# Patient Record
Sex: FEMALE | Race: WHITE | NOT HISPANIC OR LATINO | Employment: OTHER | URBAN - METROPOLITAN AREA
[De-identification: names, ages, dates, MRNs, and addresses within clinical notes are randomized per-mention and may not be internally consistent; named-entity substitution may affect disease eponyms.]

---

## 2017-01-01 ENCOUNTER — OFFICE VISIT (OUTPATIENT)
Dept: CARDIOLOGY | Facility: CLINIC | Age: 82
End: 2017-01-01

## 2017-01-01 ENCOUNTER — HOSPITAL ENCOUNTER (INPATIENT)
Facility: HOSPITAL | Age: 82
LOS: 2 days | End: 2017-03-29
Attending: EMERGENCY MEDICINE | Admitting: INTERNAL MEDICINE

## 2017-01-01 ENCOUNTER — APPOINTMENT (OUTPATIENT)
Dept: CT IMAGING | Facility: HOSPITAL | Age: 82
End: 2017-01-01

## 2017-01-01 ENCOUNTER — APPOINTMENT (OUTPATIENT)
Dept: GENERAL RADIOLOGY | Facility: HOSPITAL | Age: 82
End: 2017-01-01

## 2017-01-01 ENCOUNTER — HOSPITAL ENCOUNTER (INPATIENT)
Facility: HOSPITAL | Age: 82
LOS: 10 days | Discharge: NURSING, REHAB OR LONG-TERM CARE | End: 2017-02-15
Attending: EMERGENCY MEDICINE | Admitting: HOSPITALIST

## 2017-01-01 ENCOUNTER — APPOINTMENT (OUTPATIENT)
Dept: CARDIOLOGY | Facility: HOSPITAL | Age: 82
End: 2017-01-01
Attending: HOSPITALIST

## 2017-01-01 ENCOUNTER — APPOINTMENT (OUTPATIENT)
Dept: ULTRASOUND IMAGING | Facility: HOSPITAL | Age: 82
End: 2017-01-01

## 2017-01-01 ENCOUNTER — APPOINTMENT (OUTPATIENT)
Dept: GENERAL RADIOLOGY | Facility: HOSPITAL | Age: 82
End: 2017-01-01
Attending: HOSPITALIST

## 2017-01-01 VITALS
HEIGHT: 62 IN | RESPIRATION RATE: 20 BRPM | HEART RATE: 66 BPM | OXYGEN SATURATION: 93 % | WEIGHT: 214.6 LBS | TEMPERATURE: 97.3 F | SYSTOLIC BLOOD PRESSURE: 119 MMHG | BODY MASS INDEX: 39.49 KG/M2 | DIASTOLIC BLOOD PRESSURE: 48 MMHG

## 2017-01-01 VITALS
HEIGHT: 62 IN | SYSTOLIC BLOOD PRESSURE: 62 MMHG | BODY MASS INDEX: 43.37 KG/M2 | OXYGEN SATURATION: 88 % | TEMPERATURE: 100 F | DIASTOLIC BLOOD PRESSURE: 31 MMHG | WEIGHT: 235.67 LBS

## 2017-01-01 VITALS
HEIGHT: 62 IN | SYSTOLIC BLOOD PRESSURE: 118 MMHG | WEIGHT: 214 LBS | DIASTOLIC BLOOD PRESSURE: 62 MMHG | BODY MASS INDEX: 39.38 KG/M2 | HEART RATE: 63 BPM

## 2017-01-01 DIAGNOSIS — I50.33 ACUTE ON CHRONIC DIASTOLIC (CONGESTIVE) HEART FAILURE (HCC): Primary | ICD-10-CM

## 2017-01-01 DIAGNOSIS — L03.119 CELLULITIS OF LOWER EXTREMITY, UNSPECIFIED LATERALITY: ICD-10-CM

## 2017-01-01 DIAGNOSIS — I73.9 PAD (PERIPHERAL ARTERY DISEASE) (HCC): ICD-10-CM

## 2017-01-01 DIAGNOSIS — J18.9 PNEUMONIA OF BOTH LUNGS DUE TO INFECTIOUS ORGANISM, UNSPECIFIED PART OF LUNG: ICD-10-CM

## 2017-01-01 DIAGNOSIS — E87.0 HYPERNATREMIA: ICD-10-CM

## 2017-01-01 DIAGNOSIS — I50.9 ACUTE CONGESTIVE HEART FAILURE, UNSPECIFIED CONGESTIVE HEART FAILURE TYPE: Primary | ICD-10-CM

## 2017-01-01 DIAGNOSIS — N18.30 CKD (CHRONIC KIDNEY DISEASE) STAGE 3, GFR 30-59 ML/MIN (HCC): ICD-10-CM

## 2017-01-01 DIAGNOSIS — M62.81 MUSCLE WEAKNESS (GENERALIZED): ICD-10-CM

## 2017-01-01 LAB
ALBUMIN SERPL-MCNC: 3.2 G/DL (ref 3.5–5.2)
ALBUMIN SERPL-MCNC: 3.4 G/DL (ref 3.5–5.2)
ALBUMIN SERPL-MCNC: 3.4 G/DL (ref 3.5–5.2)
ALBUMIN SERPL-MCNC: 3.5 G/DL (ref 3.5–5.2)
ALBUMIN/GLOB SERPL: 1.1 G/DL
ALBUMIN/GLOB SERPL: 1.2 G/DL
ALP SERPL-CCNC: 71 U/L (ref 39–117)
ALP SERPL-CCNC: 77 U/L (ref 39–117)
ALP SERPL-CCNC: 86 U/L (ref 39–117)
ALP SERPL-CCNC: 94 U/L (ref 39–117)
ALT SERPL W P-5'-P-CCNC: 10 U/L (ref 1–33)
ALT SERPL W P-5'-P-CCNC: 13 U/L (ref 1–33)
ALT SERPL W P-5'-P-CCNC: 7 U/L (ref 1–33)
ALT SERPL W P-5'-P-CCNC: 8 U/L (ref 1–33)
ANION GAP SERPL CALCULATED.3IONS-SCNC: 10.6 MMOL/L
ANION GAP SERPL CALCULATED.3IONS-SCNC: 11.5 MMOL/L
ANION GAP SERPL CALCULATED.3IONS-SCNC: 12.5 MMOL/L
ANION GAP SERPL CALCULATED.3IONS-SCNC: 12.8 MMOL/L
ANION GAP SERPL CALCULATED.3IONS-SCNC: 13.3 MMOL/L
ANION GAP SERPL CALCULATED.3IONS-SCNC: 13.4 MMOL/L
ANION GAP SERPL CALCULATED.3IONS-SCNC: 13.7 MMOL/L
ANION GAP SERPL CALCULATED.3IONS-SCNC: 14.1 MMOL/L
ANION GAP SERPL CALCULATED.3IONS-SCNC: 14.5 MMOL/L
ANION GAP SERPL CALCULATED.3IONS-SCNC: 14.9 MMOL/L
ANION GAP SERPL CALCULATED.3IONS-SCNC: 15.4 MMOL/L
ANION GAP SERPL CALCULATED.3IONS-SCNC: 15.4 MMOL/L
ANION GAP SERPL CALCULATED.3IONS-SCNC: 15.5 MMOL/L
ANION GAP SERPL CALCULATED.3IONS-SCNC: 9 MMOL/L
ARTERIAL PATENCY WRIST A: ABNORMAL
ASCENDING AORTA: 2.7 CM
AST SERPL-CCNC: 15 U/L (ref 1–32)
AST SERPL-CCNC: 15 U/L (ref 1–32)
AST SERPL-CCNC: 16 U/L (ref 1–32)
AST SERPL-CCNC: 18 U/L (ref 1–32)
ATMOSPHERIC PRESS: 748.2 MMHG
B PERT DNA SPEC QL NAA+PROBE: NOT DETECTED
B PERT DNA SPEC QL NAA+PROBE: NOT DETECTED
BACTERIA SPEC AEROBE CULT: NORMAL
BACTERIA SPEC AEROBE CULT: NORMAL
BACTERIA SPEC RESP CULT: NORMAL
BASE EXCESS BLDA CALC-SCNC: 2.8 MMOL/L (ref 0–2)
BASOPHILS # BLD AUTO: 0.01 10*3/MM3 (ref 0–0.2)
BASOPHILS # BLD AUTO: 0.02 10*3/MM3 (ref 0–0.2)
BASOPHILS # BLD AUTO: 0.02 10*3/MM3 (ref 0–0.2)
BASOPHILS # BLD AUTO: 0.03 10*3/MM3 (ref 0–0.2)
BASOPHILS # BLD AUTO: 0.04 10*3/MM3 (ref 0–0.2)
BASOPHILS # BLD AUTO: 0.04 10*3/MM3 (ref 0–0.2)
BASOPHILS # BLD AUTO: 0.06 10*3/MM3 (ref 0–0.2)
BASOPHILS NFR BLD AUTO: 0.1 % (ref 0–1.5)
BASOPHILS NFR BLD AUTO: 0.2 % (ref 0–1.5)
BASOPHILS NFR BLD AUTO: 0.2 % (ref 0–1.5)
BASOPHILS NFR BLD AUTO: 0.4 % (ref 0–1.5)
BASOPHILS NFR BLD AUTO: 0.5 % (ref 0–1.5)
BASOPHILS NFR BLD AUTO: 0.6 % (ref 0–1.5)
BASOPHILS NFR BLD AUTO: 0.6 % (ref 0–1.5)
BASOPHILS NFR BLD AUTO: 1 % (ref 0–1.5)
BDY SITE: ABNORMAL
BH CV ECHO MEAS - ACS: 1.3 CM
BH CV ECHO MEAS - AO MAX PG (FULL): 5.3 MMHG
BH CV ECHO MEAS - AO MAX PG: 8.1 MMHG
BH CV ECHO MEAS - AO MEAN PG (FULL): 3 MMHG
BH CV ECHO MEAS - AO MEAN PG: 4 MMHG
BH CV ECHO MEAS - AO ROOT AREA (BSA CORRECTED): 1.4
BH CV ECHO MEAS - AO ROOT AREA: 6.2 CM^2
BH CV ECHO MEAS - AO ROOT DIAM: 2.8 CM
BH CV ECHO MEAS - AO V2 MAX: 142 CM/SEC
BH CV ECHO MEAS - AO V2 MEAN: 97.5 CM/SEC
BH CV ECHO MEAS - AO V2 VTI: 34.7 CM
BH CV ECHO MEAS - ASC AORTA: 2.7 CM
BH CV ECHO MEAS - AVA(I,A): 1.8 CM^2
BH CV ECHO MEAS - AVA(I,D): 1.8 CM^2
BH CV ECHO MEAS - AVA(V,A): 1.7 CM^2
BH CV ECHO MEAS - AVA(V,D): 1.7 CM^2
BH CV ECHO MEAS - BSA(HAYCOCK): 2.2 M^2
BH CV ECHO MEAS - BSA: 2 M^2
BH CV ECHO MEAS - BZI_BMI: 43 KILOGRAMS/M^2
BH CV ECHO MEAS - BZI_METRIC_HEIGHT: 157.5 CM
BH CV ECHO MEAS - BZI_METRIC_WEIGHT: 106.6 KG
BH CV ECHO MEAS - CONTRAST EF (2CH): 48.9 ML/M^2
BH CV ECHO MEAS - CONTRAST EF 4CH: 62.7 ML/M^2
BH CV ECHO MEAS - EDV(CUBED): 175.6 ML
BH CV ECHO MEAS - EDV(MOD-SP2): 45 ML
BH CV ECHO MEAS - EDV(MOD-SP4): 59 ML
BH CV ECHO MEAS - EDV(TEICH): 153.7 ML
BH CV ECHO MEAS - EF(CUBED): 71.2 %
BH CV ECHO MEAS - EF(MOD-SP2): 48.9 %
BH CV ECHO MEAS - EF(MOD-SP4): 62.7 %
BH CV ECHO MEAS - EF(TEICH): 62.2 %
BH CV ECHO MEAS - ESV(CUBED): 50.7 ML
BH CV ECHO MEAS - ESV(MOD-SP2): 23 ML
BH CV ECHO MEAS - ESV(MOD-SP4): 22 ML
BH CV ECHO MEAS - ESV(TEICH): 58.1 ML
BH CV ECHO MEAS - FS: 33.9 %
BH CV ECHO MEAS - IVS/LVPW: 1.2
BH CV ECHO MEAS - IVSD: 0.7 CM
BH CV ECHO MEAS - LAT PEAK E' VEL: 8 CM/SEC
BH CV ECHO MEAS - LV DIASTOLIC VOL/BSA (35-75): 28.8 ML/M^2
BH CV ECHO MEAS - LV MASS(C)D: 127.8 GRAMS
BH CV ECHO MEAS - LV MASS(C)DI: 62.4 GRAMS/M^2
BH CV ECHO MEAS - LV MAX PG: 2.8 MMHG
BH CV ECHO MEAS - LV MEAN PG: 1 MMHG
BH CV ECHO MEAS - LV SYSTOLIC VOL/BSA (12-30): 10.7 ML/M^2
BH CV ECHO MEAS - LV V1 MAX: 83.6 CM/SEC
BH CV ECHO MEAS - LV V1 MEAN: 55 CM/SEC
BH CV ECHO MEAS - LV V1 VTI: 22.3 CM
BH CV ECHO MEAS - LVIDD: 5.6 CM
BH CV ECHO MEAS - LVIDS: 3.7 CM
BH CV ECHO MEAS - LVLD AP2: 6 CM
BH CV ECHO MEAS - LVLD AP4: 7.3 CM
BH CV ECHO MEAS - LVLS AP2: 5.7 CM
BH CV ECHO MEAS - LVLS AP4: 5.6 CM
BH CV ECHO MEAS - LVOT AREA (M): 2.8 CM^2
BH CV ECHO MEAS - LVOT AREA: 2.8 CM^2
BH CV ECHO MEAS - LVOT DIAM: 1.9 CM
BH CV ECHO MEAS - LVPWD: 0.6 CM
BH CV ECHO MEAS - MED PEAK E' VEL: 5 CM/SEC
BH CV ECHO MEAS - MV A DUR: 0.14 SEC
BH CV ECHO MEAS - MV A MAX VEL: 37.2 CM/SEC
BH CV ECHO MEAS - MV DEC SLOPE: 489 CM/SEC^2
BH CV ECHO MEAS - MV DEC TIME: 0.17 SEC
BH CV ECHO MEAS - MV E MAX VEL: 118 CM/SEC
BH CV ECHO MEAS - MV E/A: 3.2
BH CV ECHO MEAS - MV MAX PG: 6.3 MMHG
BH CV ECHO MEAS - MV MEAN PG: 1 MMHG
BH CV ECHO MEAS - MV P1/2T MAX VEL: 130 CM/SEC
BH CV ECHO MEAS - MV P1/2T: 77.9 MSEC
BH CV ECHO MEAS - MV V2 MAX: 125 CM/SEC
BH CV ECHO MEAS - MV V2 MEAN: 49.7 CM/SEC
BH CV ECHO MEAS - MV V2 VTI: 31.2 CM
BH CV ECHO MEAS - MVA P1/2T LCG: 1.7 CM^2
BH CV ECHO MEAS - MVA(P1/2T): 2.8 CM^2
BH CV ECHO MEAS - MVA(VTI): 2 CM^2
BH CV ECHO MEAS - PA ACC TIME: 0.18 SEC
BH CV ECHO MEAS - PA MAX PG (FULL): 1.6 MMHG
BH CV ECHO MEAS - PA MAX PG: 2.7 MMHG
BH CV ECHO MEAS - PA PR(ACCEL): -3.4 MMHG
BH CV ECHO MEAS - PA V2 MAX: 81.4 CM/SEC
BH CV ECHO MEAS - PULM DIAS VEL: 70.5 CM/SEC
BH CV ECHO MEAS - PULM S/D: 0.53
BH CV ECHO MEAS - PULM SYS VEL: 37.2 CM/SEC
BH CV ECHO MEAS - PVA(V,A): 4.7 CM^2
BH CV ECHO MEAS - PVA(V,D): 4.7 CM^2
BH CV ECHO MEAS - QP/QS: 1.4
BH CV ECHO MEAS - RAP SYSTOLE: 15 MMHG
BH CV ECHO MEAS - RV MAX PG: 1 MMHG
BH CV ECHO MEAS - RV MEAN PG: 0 MMHG
BH CV ECHO MEAS - RV V1 MAX: 50.2 CM/SEC
BH CV ECHO MEAS - RV V1 MEAN: 31.5 CM/SEC
BH CV ECHO MEAS - RV V1 VTI: 11.7 CM
BH CV ECHO MEAS - RVOT AREA: 7.5 CM^2
BH CV ECHO MEAS - RVOT DIAM: 3.1 CM
BH CV ECHO MEAS - RVSP: 66.3 MMHG
BH CV ECHO MEAS - SI(AO): 104.4 ML/M^2
BH CV ECHO MEAS - SI(CUBED): 61 ML/M^2
BH CV ECHO MEAS - SI(LVOT): 30.9 ML/M^2
BH CV ECHO MEAS - SI(MOD-SP2): 10.7 ML/M^2
BH CV ECHO MEAS - SI(MOD-SP4): 18.1 ML/M^2
BH CV ECHO MEAS - SI(TEICH): 46.7 ML/M^2
BH CV ECHO MEAS - SV(AO): 213.7 ML
BH CV ECHO MEAS - SV(CUBED): 125 ML
BH CV ECHO MEAS - SV(LVOT): 63.2 ML
BH CV ECHO MEAS - SV(MOD-SP2): 22 ML
BH CV ECHO MEAS - SV(MOD-SP4): 37 ML
BH CV ECHO MEAS - SV(RVOT): 88.3 ML
BH CV ECHO MEAS - SV(TEICH): 95.5 ML
BH CV ECHO MEAS - TAPSE (>1.6): 1.3 CM2
BH CV ECHO MEAS - TR MAX VEL: 358 CM/SEC
BH CV LOW VAS LEFT LESSER SAPH VESSEL: 1
BH CV LOWER VASCULAR LEFT COMMON FEMORAL AUGMENT: NORMAL
BH CV LOWER VASCULAR LEFT COMMON FEMORAL COMPETENT: NORMAL
BH CV LOWER VASCULAR LEFT COMMON FEMORAL COMPRESS: NORMAL
BH CV LOWER VASCULAR LEFT COMMON FEMORAL PHASIC: NORMAL
BH CV LOWER VASCULAR LEFT COMMON FEMORAL SPONT: NORMAL
BH CV LOWER VASCULAR LEFT DISTAL FEMORAL COMPRESS: NORMAL
BH CV LOWER VASCULAR LEFT GASTRONEMIUS COMPRESS: NORMAL
BH CV LOWER VASCULAR LEFT GREATER SAPH AK COMPRESS: NORMAL
BH CV LOWER VASCULAR LEFT GREATER SAPH BK COMPRESS: NORMAL
BH CV LOWER VASCULAR LEFT LESSER SAPH COMPRESS: NORMAL
BH CV LOWER VASCULAR LEFT LESSER SAPH THROMBUS: NORMAL
BH CV LOWER VASCULAR LEFT MID FEMORAL AUGMENT: NORMAL
BH CV LOWER VASCULAR LEFT MID FEMORAL COMPETENT: NORMAL
BH CV LOWER VASCULAR LEFT MID FEMORAL COMPRESS: NORMAL
BH CV LOWER VASCULAR LEFT MID FEMORAL PHASIC: NORMAL
BH CV LOWER VASCULAR LEFT MID FEMORAL SPONT: NORMAL
BH CV LOWER VASCULAR LEFT PERONEAL COMPRESS: NORMAL
BH CV LOWER VASCULAR LEFT POPLITEAL AUGMENT: NORMAL
BH CV LOWER VASCULAR LEFT POPLITEAL COMPETENT: NORMAL
BH CV LOWER VASCULAR LEFT POPLITEAL COMPRESS: NORMAL
BH CV LOWER VASCULAR LEFT POPLITEAL PHASIC: NORMAL
BH CV LOWER VASCULAR LEFT POPLITEAL SPONT: NORMAL
BH CV LOWER VASCULAR LEFT POSTERIOR TIBIAL COMPRESS: NORMAL
BH CV LOWER VASCULAR LEFT PROXIMAL FEMORAL COMPRESS: NORMAL
BH CV LOWER VASCULAR LEFT SAPHENOFEMORAL JUNCTION AUGMENT: NORMAL
BH CV LOWER VASCULAR LEFT SAPHENOFEMORAL JUNCTION COMPRESS: NORMAL
BH CV LOWER VASCULAR LEFT SAPHENOFEMORAL JUNCTION PHASIC: NORMAL
BH CV LOWER VASCULAR LEFT SAPHENOFEMORAL JUNCTION SPONT: NORMAL
BH CV LOWER VASCULAR RIGHT COMMON FEMORAL AUGMENT: NORMAL
BH CV LOWER VASCULAR RIGHT COMMON FEMORAL COMPETENT: NORMAL
BH CV LOWER VASCULAR RIGHT COMMON FEMORAL COMPRESS: NORMAL
BH CV LOWER VASCULAR RIGHT COMMON FEMORAL PHASIC: NORMAL
BH CV LOWER VASCULAR RIGHT COMMON FEMORAL SPONT: NORMAL
BH CV LOWER VASCULAR RIGHT DISTAL FEMORAL COMPRESS: NORMAL
BH CV LOWER VASCULAR RIGHT GASTRONEMIUS COMPRESS: NORMAL
BH CV LOWER VASCULAR RIGHT GREATER SAPH AK COMPRESS: NORMAL
BH CV LOWER VASCULAR RIGHT GREATER SAPH BK COMPRESS: NORMAL
BH CV LOWER VASCULAR RIGHT LESSER SAPH COMPRESS: NORMAL
BH CV LOWER VASCULAR RIGHT MID FEMORAL AUGMENT: NORMAL
BH CV LOWER VASCULAR RIGHT MID FEMORAL COMPETENT: NORMAL
BH CV LOWER VASCULAR RIGHT MID FEMORAL COMPRESS: NORMAL
BH CV LOWER VASCULAR RIGHT MID FEMORAL PHASIC: NORMAL
BH CV LOWER VASCULAR RIGHT MID FEMORAL SPONT: NORMAL
BH CV LOWER VASCULAR RIGHT PERONEAL COMPRESS: NORMAL
BH CV LOWER VASCULAR RIGHT POPLITEAL AUGMENT: NORMAL
BH CV LOWER VASCULAR RIGHT POPLITEAL COMPETENT: NORMAL
BH CV LOWER VASCULAR RIGHT POPLITEAL COMPRESS: NORMAL
BH CV LOWER VASCULAR RIGHT POPLITEAL PHASIC: NORMAL
BH CV LOWER VASCULAR RIGHT POPLITEAL SPONT: NORMAL
BH CV LOWER VASCULAR RIGHT POSTERIOR TIBIAL COMPRESS: NORMAL
BH CV LOWER VASCULAR RIGHT PROXIMAL FEMORAL COMPRESS: NORMAL
BH CV LOWER VASCULAR RIGHT SAPHENOFEMORAL JUNCTION AUGMENT: NORMAL
BH CV LOWER VASCULAR RIGHT SAPHENOFEMORAL JUNCTION COMPRESS: NORMAL
BH CV LOWER VASCULAR RIGHT SAPHENOFEMORAL JUNCTION PHASIC: NORMAL
BH CV LOWER VASCULAR RIGHT SAPHENOFEMORAL JUNCTION SPONT: NORMAL
BH CV XLRA - RV BASE: 3.9 CM
BH CV XLRA - TDI S': 9 CM/SEC
BILIRUB SERPL-MCNC: 0.3 MG/DL (ref 0.1–1.2)
BILIRUB SERPL-MCNC: 0.4 MG/DL (ref 0.1–1.2)
BILIRUB SERPL-MCNC: 0.4 MG/DL (ref 0.1–1.2)
BILIRUB SERPL-MCNC: 0.5 MG/DL (ref 0.1–1.2)
BILIRUB UR QL STRIP: NEGATIVE
BUN BLD-MCNC: 44 MG/DL (ref 8–23)
BUN BLD-MCNC: 44 MG/DL (ref 8–23)
BUN BLD-MCNC: 45 MG/DL (ref 8–23)
BUN BLD-MCNC: 45 MG/DL (ref 8–23)
BUN BLD-MCNC: 49 MG/DL (ref 8–23)
BUN BLD-MCNC: 51 MG/DL (ref 8–23)
BUN BLD-MCNC: 52 MG/DL (ref 8–23)
BUN BLD-MCNC: 53 MG/DL (ref 8–23)
BUN BLD-MCNC: 53 MG/DL (ref 8–23)
BUN BLD-MCNC: 54 MG/DL (ref 8–23)
BUN BLD-MCNC: 56 MG/DL (ref 8–23)
BUN BLD-MCNC: 56 MG/DL (ref 8–23)
BUN BLD-MCNC: 57 MG/DL (ref 8–23)
BUN BLD-MCNC: 63 MG/DL (ref 8–23)
BUN/CREAT SERPL: 25 (ref 7–25)
BUN/CREAT SERPL: 25.7 (ref 7–25)
BUN/CREAT SERPL: 26.1 (ref 7–25)
BUN/CREAT SERPL: 26.3 (ref 7–25)
BUN/CREAT SERPL: 26.8 (ref 7–25)
BUN/CREAT SERPL: 28.6 (ref 7–25)
BUN/CREAT SERPL: 29.8 (ref 7–25)
BUN/CREAT SERPL: 29.9 (ref 7–25)
BUN/CREAT SERPL: 30 (ref 7–25)
BUN/CREAT SERPL: 30.2 (ref 7–25)
BUN/CREAT SERPL: 30.6 (ref 7–25)
BUN/CREAT SERPL: 31 (ref 7–25)
BUN/CREAT SERPL: 31.8 (ref 7–25)
BUN/CREAT SERPL: 35.2 (ref 7–25)
C PNEUM DNA NPH QL NAA+NON-PROBE: NOT DETECTED
C PNEUM DNA NPH QL NAA+NON-PROBE: NOT DETECTED
CALCIUM SPEC-SCNC: 10 MG/DL (ref 8.6–10.5)
CALCIUM SPEC-SCNC: 9.1 MG/DL (ref 8.6–10.5)
CALCIUM SPEC-SCNC: 9.2 MG/DL (ref 8.6–10.5)
CALCIUM SPEC-SCNC: 9.3 MG/DL (ref 8.6–10.5)
CALCIUM SPEC-SCNC: 9.4 MG/DL (ref 8.6–10.5)
CALCIUM SPEC-SCNC: 9.6 MG/DL (ref 8.6–10.5)
CALCIUM SPEC-SCNC: 9.6 MG/DL (ref 8.6–10.5)
CALCIUM SPEC-SCNC: 9.7 MG/DL (ref 8.6–10.5)
CALCIUM SPEC-SCNC: 9.8 MG/DL (ref 8.6–10.5)
CALCIUM SPEC-SCNC: 9.8 MG/DL (ref 8.6–10.5)
CALCIUM SPEC-SCNC: 9.9 MG/DL (ref 8.6–10.5)
CALCIUM SPEC-SCNC: 9.9 MG/DL (ref 8.6–10.5)
CHLORIDE SERPL-SCNC: 101 MMOL/L (ref 98–107)
CHLORIDE SERPL-SCNC: 102 MMOL/L (ref 98–107)
CHLORIDE SERPL-SCNC: 102 MMOL/L (ref 98–107)
CHLORIDE SERPL-SCNC: 103 MMOL/L (ref 98–107)
CHLORIDE SERPL-SCNC: 105 MMOL/L (ref 98–107)
CHLORIDE SERPL-SCNC: 105 MMOL/L (ref 98–107)
CK SERPL-CCNC: 32 U/L (ref 20–180)
CLARITY UR: ABNORMAL
CO2 SERPL-SCNC: 20.6 MMOL/L (ref 22–29)
CO2 SERPL-SCNC: 21.5 MMOL/L (ref 22–29)
CO2 SERPL-SCNC: 22.1 MMOL/L (ref 22–29)
CO2 SERPL-SCNC: 22.6 MMOL/L (ref 22–29)
CO2 SERPL-SCNC: 25.7 MMOL/L (ref 22–29)
CO2 SERPL-SCNC: 26.5 MMOL/L (ref 22–29)
CO2 SERPL-SCNC: 26.6 MMOL/L (ref 22–29)
CO2 SERPL-SCNC: 27.5 MMOL/L (ref 22–29)
CO2 SERPL-SCNC: 30.2 MMOL/L (ref 22–29)
CO2 SERPL-SCNC: 30.5 MMOL/L (ref 22–29)
CO2 SERPL-SCNC: 30.9 MMOL/L (ref 22–29)
CO2 SERPL-SCNC: 31.3 MMOL/L (ref 22–29)
CO2 SERPL-SCNC: 34 MMOL/L (ref 22–29)
CO2 SERPL-SCNC: 34.4 MMOL/L (ref 22–29)
COLOR UR: YELLOW
CREAT BLD-MCNC: 1.5 MG/DL (ref 0.57–1)
CREAT BLD-MCNC: 1.51 MG/DL (ref 0.57–1)
CREAT BLD-MCNC: 1.62 MG/DL (ref 0.57–1)
CREAT BLD-MCNC: 1.62 MG/DL (ref 0.57–1)
CREAT BLD-MCNC: 1.64 MG/DL (ref 0.57–1)
CREAT BLD-MCNC: 1.74 MG/DL (ref 0.57–1)
CREAT BLD-MCNC: 1.76 MG/DL (ref 0.57–1)
CREAT BLD-MCNC: 1.76 MG/DL (ref 0.57–1)
CREAT BLD-MCNC: 1.77 MG/DL (ref 0.57–1)
CREAT BLD-MCNC: 1.83 MG/DL (ref 0.57–1)
CREAT BLD-MCNC: 1.85 MG/DL (ref 0.57–1)
CREAT BLD-MCNC: 1.94 MG/DL (ref 0.57–1)
CREAT BLD-MCNC: 1.99 MG/DL (ref 0.57–1)
CREAT BLD-MCNC: 2.45 MG/DL (ref 0.57–1)
CRP SERPL-MCNC: 1.91 MG/DL (ref 0–0.5)
DEPRECATED RDW RBC AUTO: 51.8 FL (ref 37–54)
DEPRECATED RDW RBC AUTO: 52.3 FL (ref 37–54)
DEPRECATED RDW RBC AUTO: 52.8 FL (ref 37–54)
DEPRECATED RDW RBC AUTO: 52.8 FL (ref 37–54)
DEPRECATED RDW RBC AUTO: 53.1 FL (ref 37–54)
DEPRECATED RDW RBC AUTO: 53.4 FL (ref 37–54)
DEPRECATED RDW RBC AUTO: 53.7 FL (ref 37–54)
DEPRECATED RDW RBC AUTO: 54 FL (ref 37–54)
DEPRECATED RDW RBC AUTO: 55.3 FL (ref 37–54)
DEPRECATED RDW RBC AUTO: 56.1 FL (ref 37–54)
DEPRECATED RDW RBC AUTO: 56.2 FL (ref 37–54)
DEPRECATED RDW RBC AUTO: 56.2 FL (ref 37–54)
DEPRECATED RDW RBC AUTO: 57.4 FL (ref 37–54)
E/E' RATIO: 20
EOSINOPHIL # BLD AUTO: 0 10*3/MM3 (ref 0–0.7)
EOSINOPHIL # BLD AUTO: 0 10*3/MM3 (ref 0–0.7)
EOSINOPHIL # BLD AUTO: 0.07 10*3/MM3 (ref 0–0.7)
EOSINOPHIL # BLD AUTO: 0.21 10*3/MM3 (ref 0–0.7)
EOSINOPHIL # BLD AUTO: 0.24 10*3/MM3 (ref 0–0.7)
EOSINOPHIL # BLD AUTO: 0.24 10*3/MM3 (ref 0–0.7)
EOSINOPHIL # BLD AUTO: 0.25 10*3/MM3 (ref 0–0.7)
EOSINOPHIL # BLD AUTO: 0.3 10*3/MM3 (ref 0–0.7)
EOSINOPHIL # BLD AUTO: 0.34 10*3/MM3 (ref 0–0.7)
EOSINOPHIL # BLD AUTO: 0.38 10*3/MM3 (ref 0–0.7)
EOSINOPHIL NFR BLD AUTO: 0 % (ref 0.3–6.2)
EOSINOPHIL NFR BLD AUTO: 0 % (ref 0.3–6.2)
EOSINOPHIL NFR BLD AUTO: 0.8 % (ref 0.3–6.2)
EOSINOPHIL NFR BLD AUTO: 2.9 % (ref 0.3–6.2)
EOSINOPHIL NFR BLD AUTO: 3.7 % (ref 0.3–6.2)
EOSINOPHIL NFR BLD AUTO: 3.8 % (ref 0.3–6.2)
EOSINOPHIL NFR BLD AUTO: 4 % (ref 0.3–6.2)
EOSINOPHIL NFR BLD AUTO: 4.5 % (ref 0.3–6.2)
EOSINOPHIL NFR BLD AUTO: 4.9 % (ref 0.3–6.2)
EOSINOPHIL NFR BLD AUTO: 6.8 % (ref 0.3–6.2)
ERYTHROCYTE [DISTWIDTH] IN BLOOD BY AUTOMATED COUNT: 15.4 % (ref 11.7–13)
ERYTHROCYTE [DISTWIDTH] IN BLOOD BY AUTOMATED COUNT: 15.5 % (ref 11.7–13)
ERYTHROCYTE [DISTWIDTH] IN BLOOD BY AUTOMATED COUNT: 15.6 % (ref 11.7–13)
ERYTHROCYTE [DISTWIDTH] IN BLOOD BY AUTOMATED COUNT: 15.7 % (ref 11.7–13)
ERYTHROCYTE [DISTWIDTH] IN BLOOD BY AUTOMATED COUNT: 15.7 % (ref 11.7–13)
ERYTHROCYTE [DISTWIDTH] IN BLOOD BY AUTOMATED COUNT: 16 % (ref 11.7–13)
ERYTHROCYTE [DISTWIDTH] IN BLOOD BY AUTOMATED COUNT: 16.2 % (ref 11.7–13)
ERYTHROCYTE [DISTWIDTH] IN BLOOD BY AUTOMATED COUNT: 16.2 % (ref 11.7–13)
ERYTHROCYTE [DISTWIDTH] IN BLOOD BY AUTOMATED COUNT: 16.3 % (ref 11.7–13)
ERYTHROCYTE [DISTWIDTH] IN BLOOD BY AUTOMATED COUNT: 16.3 % (ref 11.7–13)
FLUAV H1 2009 PAND RNA NPH QL NAA+PROBE: NOT DETECTED
FLUAV H1 2009 PAND RNA NPH QL NAA+PROBE: NOT DETECTED
FLUAV H1 HA GENE NPH QL NAA+PROBE: NOT DETECTED
FLUAV H1 HA GENE NPH QL NAA+PROBE: NOT DETECTED
FLUAV H3 RNA NPH QL NAA+PROBE: NOT DETECTED
FLUAV H3 RNA NPH QL NAA+PROBE: NOT DETECTED
FLUAV SUBTYP SPEC NAA+PROBE: NOT DETECTED
FLUAV SUBTYP SPEC NAA+PROBE: NOT DETECTED
FLUBV RNA ISLT QL NAA+PROBE: NOT DETECTED
FLUBV RNA ISLT QL NAA+PROBE: NOT DETECTED
FOLATE SERPL-MCNC: >20 NG/ML (ref 4.78–24.2)
GAS FLOW AIRWAY: 6 LPM
GFR SERPL CREATININE-BSD FRML MDRD: 19 ML/MIN/1.73
GFR SERPL CREATININE-BSD FRML MDRD: 24 ML/MIN/1.73
GFR SERPL CREATININE-BSD FRML MDRD: 25 ML/MIN/1.73
GFR SERPL CREATININE-BSD FRML MDRD: 26 ML/MIN/1.73
GFR SERPL CREATININE-BSD FRML MDRD: 26 ML/MIN/1.73
GFR SERPL CREATININE-BSD FRML MDRD: 27 ML/MIN/1.73
GFR SERPL CREATININE-BSD FRML MDRD: 28 ML/MIN/1.73
GFR SERPL CREATININE-BSD FRML MDRD: 30 ML/MIN/1.73
GFR SERPL CREATININE-BSD FRML MDRD: 33 ML/MIN/1.73
GFR SERPL CREATININE-BSD FRML MDRD: 33 ML/MIN/1.73
GLOBULIN UR ELPH-MCNC: 2.8 GM/DL
GLOBULIN UR ELPH-MCNC: 2.9 GM/DL
GLOBULIN UR ELPH-MCNC: 3 GM/DL
GLOBULIN UR ELPH-MCNC: 3.1 GM/DL
GLUCOSE BLD-MCNC: 107 MG/DL (ref 65–99)
GLUCOSE BLD-MCNC: 115 MG/DL (ref 65–99)
GLUCOSE BLD-MCNC: 121 MG/DL (ref 65–99)
GLUCOSE BLD-MCNC: 125 MG/DL (ref 65–99)
GLUCOSE BLD-MCNC: 132 MG/DL (ref 65–99)
GLUCOSE BLD-MCNC: 144 MG/DL (ref 65–99)
GLUCOSE BLD-MCNC: 165 MG/DL (ref 65–99)
GLUCOSE BLD-MCNC: 40 MG/DL (ref 65–99)
GLUCOSE BLD-MCNC: 69 MG/DL (ref 65–99)
GLUCOSE BLD-MCNC: 86 MG/DL (ref 65–99)
GLUCOSE BLD-MCNC: 89 MG/DL (ref 65–99)
GLUCOSE BLD-MCNC: 96 MG/DL (ref 65–99)
GLUCOSE BLD-MCNC: 97 MG/DL (ref 65–99)
GLUCOSE BLD-MCNC: 99 MG/DL (ref 65–99)
GLUCOSE BLDC GLUCOMTR-MCNC: 102 MG/DL (ref 70–130)
GLUCOSE BLDC GLUCOMTR-MCNC: 109 MG/DL (ref 70–130)
GLUCOSE BLDC GLUCOMTR-MCNC: 110 MG/DL (ref 70–130)
GLUCOSE BLDC GLUCOMTR-MCNC: 111 MG/DL (ref 70–130)
GLUCOSE BLDC GLUCOMTR-MCNC: 111 MG/DL (ref 70–130)
GLUCOSE BLDC GLUCOMTR-MCNC: 113 MG/DL (ref 70–130)
GLUCOSE BLDC GLUCOMTR-MCNC: 115 MG/DL (ref 70–130)
GLUCOSE BLDC GLUCOMTR-MCNC: 115 MG/DL (ref 70–130)
GLUCOSE BLDC GLUCOMTR-MCNC: 116 MG/DL (ref 70–130)
GLUCOSE BLDC GLUCOMTR-MCNC: 118 MG/DL (ref 70–130)
GLUCOSE BLDC GLUCOMTR-MCNC: 125 MG/DL (ref 70–130)
GLUCOSE BLDC GLUCOMTR-MCNC: 127 MG/DL (ref 70–130)
GLUCOSE BLDC GLUCOMTR-MCNC: 128 MG/DL (ref 70–130)
GLUCOSE BLDC GLUCOMTR-MCNC: 131 MG/DL (ref 70–130)
GLUCOSE BLDC GLUCOMTR-MCNC: 139 MG/DL (ref 70–130)
GLUCOSE BLDC GLUCOMTR-MCNC: 140 MG/DL (ref 70–130)
GLUCOSE BLDC GLUCOMTR-MCNC: 142 MG/DL (ref 70–130)
GLUCOSE BLDC GLUCOMTR-MCNC: 144 MG/DL (ref 70–130)
GLUCOSE BLDC GLUCOMTR-MCNC: 150 MG/DL (ref 70–130)
GLUCOSE BLDC GLUCOMTR-MCNC: 161 MG/DL (ref 70–130)
GLUCOSE BLDC GLUCOMTR-MCNC: 164 MG/DL (ref 70–130)
GLUCOSE BLDC GLUCOMTR-MCNC: 173 MG/DL (ref 70–130)
GLUCOSE BLDC GLUCOMTR-MCNC: 179 MG/DL (ref 70–130)
GLUCOSE BLDC GLUCOMTR-MCNC: 182 MG/DL (ref 70–130)
GLUCOSE BLDC GLUCOMTR-MCNC: 186 MG/DL (ref 70–130)
GLUCOSE BLDC GLUCOMTR-MCNC: 190 MG/DL (ref 70–130)
GLUCOSE BLDC GLUCOMTR-MCNC: 195 MG/DL (ref 70–130)
GLUCOSE BLDC GLUCOMTR-MCNC: 198 MG/DL (ref 70–130)
GLUCOSE BLDC GLUCOMTR-MCNC: 206 MG/DL (ref 70–130)
GLUCOSE BLDC GLUCOMTR-MCNC: 206 MG/DL (ref 70–130)
GLUCOSE BLDC GLUCOMTR-MCNC: 208 MG/DL (ref 70–130)
GLUCOSE BLDC GLUCOMTR-MCNC: 212 MG/DL (ref 70–130)
GLUCOSE BLDC GLUCOMTR-MCNC: 224 MG/DL (ref 70–130)
GLUCOSE BLDC GLUCOMTR-MCNC: 239 MG/DL (ref 70–130)
GLUCOSE BLDC GLUCOMTR-MCNC: 254 MG/DL (ref 70–130)
GLUCOSE BLDC GLUCOMTR-MCNC: 51 MG/DL (ref 70–130)
GLUCOSE BLDC GLUCOMTR-MCNC: 70 MG/DL (ref 70–130)
GLUCOSE BLDC GLUCOMTR-MCNC: 74 MG/DL (ref 70–130)
GLUCOSE BLDC GLUCOMTR-MCNC: 77 MG/DL (ref 70–130)
GLUCOSE BLDC GLUCOMTR-MCNC: 79 MG/DL (ref 70–130)
GLUCOSE BLDC GLUCOMTR-MCNC: 79 MG/DL (ref 70–130)
GLUCOSE BLDC GLUCOMTR-MCNC: 84 MG/DL (ref 70–130)
GLUCOSE BLDC GLUCOMTR-MCNC: 87 MG/DL (ref 70–130)
GLUCOSE BLDC GLUCOMTR-MCNC: 88 MG/DL (ref 70–130)
GLUCOSE BLDC GLUCOMTR-MCNC: 95 MG/DL (ref 70–130)
GLUCOSE BLDC GLUCOMTR-MCNC: 97 MG/DL (ref 70–130)
GLUCOSE BLDC GLUCOMTR-MCNC: 98 MG/DL (ref 70–130)
GLUCOSE UR STRIP-MCNC: NEGATIVE MG/DL
GRAM STN SPEC: NORMAL
GRAM STN SPEC: NORMAL
HADV DNA SPEC NAA+PROBE: NOT DETECTED
HADV DNA SPEC NAA+PROBE: NOT DETECTED
HBA1C MFR BLD: 6.04 % (ref 4.8–5.6)
HCO3 BLDA-SCNC: 28.4 MMOL/L (ref 22–28)
HCOV 229E RNA SPEC QL NAA+PROBE: NOT DETECTED
HCOV 229E RNA SPEC QL NAA+PROBE: NOT DETECTED
HCOV HKU1 RNA SPEC QL NAA+PROBE: NOT DETECTED
HCOV HKU1 RNA SPEC QL NAA+PROBE: NOT DETECTED
HCOV NL63 RNA SPEC QL NAA+PROBE: NOT DETECTED
HCOV NL63 RNA SPEC QL NAA+PROBE: NOT DETECTED
HCOV OC43 RNA SPEC QL NAA+PROBE: NOT DETECTED
HCOV OC43 RNA SPEC QL NAA+PROBE: NOT DETECTED
HCT VFR BLD AUTO: 30.4 % (ref 35.6–45.5)
HCT VFR BLD AUTO: 31.4 % (ref 35.6–45.5)
HCT VFR BLD AUTO: 31.5 % (ref 35.6–45.5)
HCT VFR BLD AUTO: 31.8 % (ref 35.6–45.5)
HCT VFR BLD AUTO: 32.3 % (ref 35.6–45.5)
HCT VFR BLD AUTO: 32.4 % (ref 35.6–45.5)
HCT VFR BLD AUTO: 32.7 % (ref 35.6–45.5)
HCT VFR BLD AUTO: 32.7 % (ref 35.6–45.5)
HCT VFR BLD AUTO: 33 % (ref 35.6–45.5)
HCT VFR BLD AUTO: 33.6 % (ref 35.6–45.5)
HCT VFR BLD AUTO: 33.7 % (ref 35.6–45.5)
HCT VFR BLD AUTO: 34.2 % (ref 35.6–45.5)
HCT VFR BLD AUTO: 34.5 % (ref 35.6–45.5)
HGB BLD-MCNC: 10 G/DL (ref 11.9–15.5)
HGB BLD-MCNC: 10.2 G/DL (ref 11.9–15.5)
HGB BLD-MCNC: 10.3 G/DL (ref 11.9–15.5)
HGB BLD-MCNC: 10.6 G/DL (ref 11.9–15.5)
HGB BLD-MCNC: 9.5 G/DL (ref 11.9–15.5)
HGB BLD-MCNC: 9.5 G/DL (ref 11.9–15.5)
HGB BLD-MCNC: 9.6 G/DL (ref 11.9–15.5)
HGB BLD-MCNC: 9.8 G/DL (ref 11.9–15.5)
HGB BLD-MCNC: 9.9 G/DL (ref 11.9–15.5)
HGB UR QL STRIP.AUTO: NEGATIVE
HMPV RNA NPH QL NAA+NON-PROBE: DETECTED
HMPV RNA NPH QL NAA+NON-PROBE: NOT DETECTED
HOLD SPECIMEN: NORMAL
HOLD SPECIMEN: NORMAL
HPIV1 RNA SPEC QL NAA+PROBE: NOT DETECTED
HPIV1 RNA SPEC QL NAA+PROBE: NOT DETECTED
HPIV2 RNA SPEC QL NAA+PROBE: NOT DETECTED
HPIV2 RNA SPEC QL NAA+PROBE: NOT DETECTED
HPIV3 RNA NPH QL NAA+PROBE: NOT DETECTED
HPIV3 RNA NPH QL NAA+PROBE: NOT DETECTED
HPIV4 P GENE NPH QL NAA+PROBE: NOT DETECTED
HPIV4 P GENE NPH QL NAA+PROBE: NOT DETECTED
IMM GRANULOCYTES # BLD: 0 10*3/MM3 (ref 0–0.03)
IMM GRANULOCYTES # BLD: 0.02 10*3/MM3 (ref 0–0.03)
IMM GRANULOCYTES NFR BLD: 0 % (ref 0–0.5)
IMM GRANULOCYTES NFR BLD: 0.2 % (ref 0–0.5)
IMM GRANULOCYTES NFR BLD: 0.2 % (ref 0–0.5)
IMM GRANULOCYTES NFR BLD: 0.3 % (ref 0–0.5)
IRON 24H UR-MRATE: 43 MCG/DL (ref 37–145)
IRON SATN MFR SERPL: 13 % (ref 20–50)
KETONES UR QL STRIP: NEGATIVE
LEFT ATRIUM VOLUME INDEX: 29 ML/M2
LEUKOCYTE ESTERASE UR QL STRIP.AUTO: NEGATIVE
LYMPHOCYTES # BLD AUTO: 0.61 10*3/MM3 (ref 0.9–4.8)
LYMPHOCYTES # BLD AUTO: 1.02 10*3/MM3 (ref 0.9–4.8)
LYMPHOCYTES # BLD AUTO: 1.22 10*3/MM3 (ref 0.9–4.8)
LYMPHOCYTES # BLD AUTO: 1.28 10*3/MM3 (ref 0.9–4.8)
LYMPHOCYTES # BLD AUTO: 1.33 10*3/MM3 (ref 0.9–4.8)
LYMPHOCYTES # BLD AUTO: 1.37 10*3/MM3 (ref 0.9–4.8)
LYMPHOCYTES # BLD AUTO: 1.43 10*3/MM3 (ref 0.9–4.8)
LYMPHOCYTES # BLD AUTO: 1.58 10*3/MM3 (ref 0.9–4.8)
LYMPHOCYTES # BLD AUTO: 1.6 10*3/MM3 (ref 0.9–4.8)
LYMPHOCYTES # BLD AUTO: 1.71 10*3/MM3 (ref 0.9–4.8)
LYMPHOCYTES NFR BLD AUTO: 11.2 % (ref 19.6–45.3)
LYMPHOCYTES NFR BLD AUTO: 13.8 % (ref 19.6–45.3)
LYMPHOCYTES NFR BLD AUTO: 20.4 % (ref 19.6–45.3)
LYMPHOCYTES NFR BLD AUTO: 20.7 % (ref 19.6–45.3)
LYMPHOCYTES NFR BLD AUTO: 22.9 % (ref 19.6–45.3)
LYMPHOCYTES NFR BLD AUTO: 23 % (ref 19.6–45.3)
LYMPHOCYTES NFR BLD AUTO: 23.9 % (ref 19.6–45.3)
LYMPHOCYTES NFR BLD AUTO: 24 % (ref 19.6–45.3)
LYMPHOCYTES NFR BLD AUTO: 24.6 % (ref 19.6–45.3)
LYMPHOCYTES NFR BLD AUTO: 9.1 % (ref 19.6–45.3)
M PNEUMO IGG SER IA-ACNC: NOT DETECTED
M PNEUMO IGG SER IA-ACNC: NOT DETECTED
MAGNESIUM SERPL-MCNC: 2.1 MG/DL (ref 1.6–2.4)
MCH RBC QN AUTO: 27.9 PG (ref 26.9–32)
MCH RBC QN AUTO: 28 PG (ref 26.9–32)
MCH RBC QN AUTO: 28.3 PG (ref 26.9–32)
MCH RBC QN AUTO: 28.5 PG (ref 26.9–32)
MCH RBC QN AUTO: 28.6 PG (ref 26.9–32)
MCH RBC QN AUTO: 28.6 PG (ref 26.9–32)
MCH RBC QN AUTO: 28.9 PG (ref 26.9–32)
MCH RBC QN AUTO: 29 PG (ref 26.9–32)
MCH RBC QN AUTO: 29.1 PG (ref 26.9–32)
MCH RBC QN AUTO: 29.3 PG (ref 26.9–32)
MCHC RBC AUTO-ENTMCNC: 29.4 G/DL (ref 32.4–36.3)
MCHC RBC AUTO-ENTMCNC: 29.6 G/DL (ref 32.4–36.3)
MCHC RBC AUTO-ENTMCNC: 29.9 G/DL (ref 32.4–36.3)
MCHC RBC AUTO-ENTMCNC: 30.3 G/DL (ref 32.4–36.3)
MCHC RBC AUTO-ENTMCNC: 30.4 G/DL (ref 32.4–36.3)
MCHC RBC AUTO-ENTMCNC: 30.8 G/DL (ref 32.4–36.3)
MCHC RBC AUTO-ENTMCNC: 31 G/DL (ref 32.4–36.3)
MCHC RBC AUTO-ENTMCNC: 31 G/DL (ref 32.4–36.3)
MCHC RBC AUTO-ENTMCNC: 31.3 G/DL (ref 32.4–36.3)
MCHC RBC AUTO-ENTMCNC: 31.7 G/DL (ref 32.4–36.3)
MCV RBC AUTO: 92.4 FL (ref 80.5–98.2)
MCV RBC AUTO: 92.4 FL (ref 80.5–98.2)
MCV RBC AUTO: 93.3 FL (ref 80.5–98.2)
MCV RBC AUTO: 93.4 FL (ref 80.5–98.2)
MCV RBC AUTO: 93.5 FL (ref 80.5–98.2)
MCV RBC AUTO: 94.1 FL (ref 80.5–98.2)
MCV RBC AUTO: 94.2 FL (ref 80.5–98.2)
MCV RBC AUTO: 94.6 FL (ref 80.5–98.2)
MCV RBC AUTO: 95.2 FL (ref 80.5–98.2)
MCV RBC AUTO: 95.2 FL (ref 80.5–98.2)
MCV RBC AUTO: 95.3 FL (ref 80.5–98.2)
MODALITY: ABNORMAL
MONOCYTES # BLD AUTO: 0.48 10*3/MM3 (ref 0.2–1.2)
MONOCYTES # BLD AUTO: 0.51 10*3/MM3 (ref 0.2–1.2)
MONOCYTES # BLD AUTO: 0.55 10*3/MM3 (ref 0.2–1.2)
MONOCYTES # BLD AUTO: 0.65 10*3/MM3 (ref 0.2–1.2)
MONOCYTES # BLD AUTO: 0.65 10*3/MM3 (ref 0.2–1.2)
MONOCYTES # BLD AUTO: 0.75 10*3/MM3 (ref 0.2–1.2)
MONOCYTES # BLD AUTO: 0.81 10*3/MM3 (ref 0.2–1.2)
MONOCYTES # BLD AUTO: 0.89 10*3/MM3 (ref 0.2–1.2)
MONOCYTES # BLD AUTO: 0.91 10*3/MM3 (ref 0.2–1.2)
MONOCYTES # BLD AUTO: 1.4 10*3/MM3 (ref 0.2–1.2)
MONOCYTES NFR BLD AUTO: 10.3 % (ref 5–12)
MONOCYTES NFR BLD AUTO: 10.4 % (ref 5–12)
MONOCYTES NFR BLD AUTO: 10.9 % (ref 5–12)
MONOCYTES NFR BLD AUTO: 11.3 % (ref 5–12)
MONOCYTES NFR BLD AUTO: 14.2 % (ref 5–12)
MONOCYTES NFR BLD AUTO: 15.4 % (ref 5–12)
MONOCYTES NFR BLD AUTO: 7.5 % (ref 5–12)
MONOCYTES NFR BLD AUTO: 8.2 % (ref 5–12)
MONOCYTES NFR BLD AUTO: 9.1 % (ref 5–12)
MONOCYTES NFR BLD AUTO: 9.7 % (ref 5–12)
MRSA SPEC QL CULT: ABNORMAL
NEUTROPHILS # BLD AUTO: 3.29 10*3/MM3 (ref 1.9–8.1)
NEUTROPHILS # BLD AUTO: 3.66 10*3/MM3 (ref 1.9–8.1)
NEUTROPHILS # BLD AUTO: 4.04 10*3/MM3 (ref 1.9–8.1)
NEUTROPHILS # BLD AUTO: 4.08 10*3/MM3 (ref 1.9–8.1)
NEUTROPHILS # BLD AUTO: 4.18 10*3/MM3 (ref 1.9–8.1)
NEUTROPHILS # BLD AUTO: 4.33 10*3/MM3 (ref 1.9–8.1)
NEUTROPHILS # BLD AUTO: 4.38 10*3/MM3 (ref 1.9–8.1)
NEUTROPHILS # BLD AUTO: 5.49 10*3/MM3 (ref 1.9–8.1)
NEUTROPHILS # BLD AUTO: 6.62 10*3/MM3 (ref 1.9–8.1)
NEUTROPHILS # BLD AUTO: 6.62 10*3/MM3 (ref 1.9–8.1)
NEUTROPHILS NFR BLD AUTO: 58.6 % (ref 42.7–76)
NEUTROPHILS NFR BLD AUTO: 59 % (ref 42.7–76)
NEUTROPHILS NFR BLD AUTO: 60.8 % (ref 42.7–76)
NEUTROPHILS NFR BLD AUTO: 61.2 % (ref 42.7–76)
NEUTROPHILS NFR BLD AUTO: 61.3 % (ref 42.7–76)
NEUTROPHILS NFR BLD AUTO: 64.2 % (ref 42.7–76)
NEUTROPHILS NFR BLD AUTO: 67.6 % (ref 42.7–76)
NEUTROPHILS NFR BLD AUTO: 73 % (ref 42.7–76)
NEUTROPHILS NFR BLD AUTO: 74.7 % (ref 42.7–76)
NEUTROPHILS NFR BLD AUTO: 82.3 % (ref 42.7–76)
NITRITE UR QL STRIP: NEGATIVE
NRBC BLD MANUAL-RTO: 0 /100 WBC (ref 0–0)
NT-PROBNP SERPL-MCNC: 6215 PG/ML (ref 0–1800)
NT-PROBNP SERPL-MCNC: 6970 PG/ML (ref 0–1800)
NT-PROBNP SERPL-MCNC: 6992 PG/ML (ref 0–1800)
NT-PROBNP SERPL-MCNC: 7555 PG/ML (ref 0–1800)
NT-PROBNP SERPL-MCNC: 9338 PG/ML (ref 0–1800)
NT-PROBNP SERPL-MCNC: ABNORMAL PG/ML (ref 0–1800)
NT-PROBNP SERPL-MCNC: ABNORMAL PG/ML (ref 0–1800)
PCO2 BLDA: 47.2 MM HG (ref 35–45)
PH BLDA: 7.39 PH UNITS (ref 7.35–7.45)
PH UR STRIP.AUTO: <=5 [PH] (ref 5–8)
PLATELET # BLD AUTO: 155 10*3/MM3 (ref 140–500)
PLATELET # BLD AUTO: 172 10*3/MM3 (ref 140–500)
PLATELET # BLD AUTO: 172 10*3/MM3 (ref 140–500)
PLATELET # BLD AUTO: 179 10*3/MM3 (ref 140–500)
PLATELET # BLD AUTO: 181 10*3/MM3 (ref 140–500)
PLATELET # BLD AUTO: 182 10*3/MM3 (ref 140–500)
PLATELET # BLD AUTO: 183 10*3/MM3 (ref 140–500)
PLATELET # BLD AUTO: 185 10*3/MM3 (ref 140–500)
PLATELET # BLD AUTO: 188 10*3/MM3 (ref 140–500)
PLATELET # BLD AUTO: 194 10*3/MM3 (ref 140–500)
PLATELET # BLD AUTO: 201 10*3/MM3 (ref 140–500)
PLATELET # BLD AUTO: 210 10*3/MM3 (ref 140–500)
PLATELET # BLD AUTO: 211 10*3/MM3 (ref 140–500)
PMV BLD AUTO: 10.6 FL (ref 6–12)
PMV BLD AUTO: 10.7 FL (ref 6–12)
PMV BLD AUTO: 10.8 FL (ref 6–12)
PMV BLD AUTO: 10.9 FL (ref 6–12)
PMV BLD AUTO: 11 FL (ref 6–12)
PMV BLD AUTO: 11.1 FL (ref 6–12)
PMV BLD AUTO: 11.2 FL (ref 6–12)
PMV BLD AUTO: 11.2 FL (ref 6–12)
PMV BLD AUTO: 11.3 FL (ref 6–12)
PMV BLD AUTO: 11.4 FL (ref 6–12)
PMV BLD AUTO: 12 FL (ref 6–12)
PO2 BLDA: 81.6 MM HG (ref 80–100)
POTASSIUM BLD-SCNC: 3.6 MMOL/L (ref 3.5–5.2)
POTASSIUM BLD-SCNC: 3.6 MMOL/L (ref 3.5–5.2)
POTASSIUM BLD-SCNC: 3.7 MMOL/L (ref 3.5–5.2)
POTASSIUM BLD-SCNC: 3.8 MMOL/L (ref 3.5–5.2)
POTASSIUM BLD-SCNC: 3.9 MMOL/L (ref 3.5–5.2)
POTASSIUM BLD-SCNC: 4.1 MMOL/L (ref 3.5–5.2)
POTASSIUM BLD-SCNC: 4.2 MMOL/L (ref 3.5–5.2)
POTASSIUM BLD-SCNC: 4.6 MMOL/L (ref 3.5–5.2)
POTASSIUM BLD-SCNC: 4.8 MMOL/L (ref 3.5–5.2)
POTASSIUM BLD-SCNC: 4.8 MMOL/L (ref 3.5–5.2)
POTASSIUM BLD-SCNC: 5 MMOL/L (ref 3.5–5.2)
POTASSIUM BLD-SCNC: 5.1 MMOL/L (ref 3.5–5.2)
POTASSIUM BLD-SCNC: 5.1 MMOL/L (ref 3.5–5.2)
PROCALCITONIN SERPL-MCNC: 0.06 NG/ML (ref 0.1–0.25)
PROCALCITONIN SERPL-MCNC: 0.07 NG/ML (ref 0.1–0.25)
PROCALCITONIN SERPL-MCNC: 0.09 NG/ML (ref 0.1–0.25)
PROCALCITONIN SERPL-MCNC: 0.1 NG/ML (ref 0.1–0.25)
PROCALCITONIN SERPL-MCNC: 0.11 NG/ML (ref 0.1–0.25)
PROCALCITONIN SERPL-MCNC: 0.29 NG/ML (ref 0.1–0.25)
PROT SERPL-MCNC: 6.1 G/DL (ref 6–8.5)
PROT SERPL-MCNC: 6.2 G/DL (ref 6–8.5)
PROT SERPL-MCNC: 6.4 G/DL (ref 6–8.5)
PROT SERPL-MCNC: 6.6 G/DL (ref 6–8.5)
PROT UR QL STRIP: NEGATIVE
RBC # BLD AUTO: 3.26 10*6/MM3 (ref 3.9–5.2)
RBC # BLD AUTO: 3.32 10*6/MM3 (ref 3.9–5.2)
RBC # BLD AUTO: 3.38 10*6/MM3 (ref 3.9–5.2)
RBC # BLD AUTO: 3.41 10*6/MM3 (ref 3.9–5.2)
RBC # BLD AUTO: 3.43 10*6/MM3 (ref 3.9–5.2)
RBC # BLD AUTO: 3.43 10*6/MM3 (ref 3.9–5.2)
RBC # BLD AUTO: 3.44 10*6/MM3 (ref 3.9–5.2)
RBC # BLD AUTO: 3.46 10*6/MM3 (ref 3.9–5.2)
RBC # BLD AUTO: 3.53 10*6/MM3 (ref 3.9–5.2)
RBC # BLD AUTO: 3.53 10*6/MM3 (ref 3.9–5.2)
RBC # BLD AUTO: 3.54 10*6/MM3 (ref 3.9–5.2)
RBC # BLD AUTO: 3.62 10*6/MM3 (ref 3.9–5.2)
RBC # BLD AUTO: 3.7 10*6/MM3 (ref 3.9–5.2)
RHINOVIRUS RNA SPEC NAA+PROBE: NOT DETECTED
RHINOVIRUS RNA SPEC NAA+PROBE: NOT DETECTED
RSV RNA NPH QL NAA+NON-PROBE: NOT DETECTED
RSV RNA NPH QL NAA+NON-PROBE: NOT DETECTED
SAO2 % BLDCOA: 95.7 % (ref 92–99)
SODIUM BLD-SCNC: 139 MMOL/L (ref 136–145)
SODIUM BLD-SCNC: 140 MMOL/L (ref 136–145)
SODIUM BLD-SCNC: 141 MMOL/L (ref 136–145)
SODIUM BLD-SCNC: 142 MMOL/L (ref 136–145)
SODIUM BLD-SCNC: 143 MMOL/L (ref 136–145)
SODIUM BLD-SCNC: 144 MMOL/L (ref 136–145)
SODIUM BLD-SCNC: 146 MMOL/L (ref 136–145)
SODIUM BLD-SCNC: 146 MMOL/L (ref 136–145)
SODIUM BLD-SCNC: 148 MMOL/L (ref 136–145)
SP GR UR STRIP: 1.01 (ref 1–1.03)
TIBC SERPL-MCNC: 329 MCG/DL (ref 298–536)
TOTAL RATE: 24 BREATHS/MINUTE
TRANSFERRIN SERPL-MCNC: 221 MG/DL (ref 200–360)
TROPONIN T SERPL-MCNC: 0.01 NG/ML (ref 0–0.03)
TROPONIN T SERPL-MCNC: <0.01 NG/ML (ref 0–0.03)
TSH SERPL DL<=0.05 MIU/L-ACNC: 3.1 MIU/ML (ref 0.27–4.2)
UROBILINOGEN UR QL STRIP: ABNORMAL
VANCOMYCIN SERPL-MCNC: 17.3 MCG/ML (ref 5–40)
VANCOMYCIN SERPL-MCNC: 19.1 MCG/ML (ref 5–40)
VANCOMYCIN SERPL-MCNC: 20.5 MCG/ML (ref 5–40)
VANCOMYCIN SERPL-MCNC: 27.2 MCG/ML (ref 5–40)
VIT B12 BLD-MCNC: 513 PG/ML (ref 211–946)
WBC NRBC COR # BLD: 5.58 10*3/MM3 (ref 4.5–10.7)
WBC NRBC COR # BLD: 5.62 10*3/MM3 (ref 4.5–10.7)
WBC NRBC COR # BLD: 6.13 10*3/MM3 (ref 4.5–10.7)
WBC NRBC COR # BLD: 6.25 10*3/MM3 (ref 4.5–10.7)
WBC NRBC COR # BLD: 6.28 10*3/MM3 (ref 4.5–10.7)
WBC NRBC COR # BLD: 6.41 10*3/MM3 (ref 4.5–10.7)
WBC NRBC COR # BLD: 6.67 10*3/MM3 (ref 4.5–10.7)
WBC NRBC COR # BLD: 6.68 10*3/MM3 (ref 4.5–10.7)
WBC NRBC COR # BLD: 6.88 10*3/MM3 (ref 4.5–10.7)
WBC NRBC COR # BLD: 7.15 10*3/MM3 (ref 4.5–10.7)
WBC NRBC COR # BLD: 7.28 10*3/MM3 (ref 4.5–10.7)
WBC NRBC COR # BLD: 8.86 10*3/MM3 (ref 4.5–10.7)
WBC NRBC COR # BLD: 9.08 10*3/MM3 (ref 4.5–10.7)
WHOLE BLOOD HOLD SPECIMEN: NORMAL
WHOLE BLOOD HOLD SPECIMEN: NORMAL

## 2017-01-01 PROCEDURE — 63710000001 INSULIN ASPART PER 5 UNITS: Performed by: HOSPITALIST

## 2017-01-01 PROCEDURE — 25010000002 ENOXAPARIN PER 10 MG: Performed by: HOSPITALIST

## 2017-01-01 PROCEDURE — 85027 COMPLETE CBC AUTOMATED: CPT | Performed by: INTERNAL MEDICINE

## 2017-01-01 PROCEDURE — 82962 GLUCOSE BLOOD TEST: CPT

## 2017-01-01 PROCEDURE — 97116 GAIT TRAINING THERAPY: CPT | Performed by: PHYSICAL THERAPIST

## 2017-01-01 PROCEDURE — 94799 UNLISTED PULMONARY SVC/PX: CPT

## 2017-01-01 PROCEDURE — 85025 COMPLETE CBC W/AUTO DIFF WBC: CPT | Performed by: INTERNAL MEDICINE

## 2017-01-01 PROCEDURE — 80048 BASIC METABOLIC PNL TOTAL CA: CPT | Performed by: INTERNAL MEDICINE

## 2017-01-01 PROCEDURE — 80053 COMPREHEN METABOLIC PANEL: CPT | Performed by: HOSPITALIST

## 2017-01-01 PROCEDURE — 80202 ASSAY OF VANCOMYCIN: CPT | Performed by: INTERNAL MEDICINE

## 2017-01-01 PROCEDURE — 97110 THERAPEUTIC EXERCISES: CPT

## 2017-01-01 PROCEDURE — 71020 HC CHEST PA AND LATERAL: CPT

## 2017-01-01 PROCEDURE — 93306 TTE W/DOPPLER COMPLETE: CPT | Performed by: INTERNAL MEDICINE

## 2017-01-01 PROCEDURE — 97162 PT EVAL MOD COMPLEX 30 MIN: CPT

## 2017-01-01 PROCEDURE — 85025 COMPLETE CBC W/AUTO DIFF WBC: CPT | Performed by: HOSPITALIST

## 2017-01-01 PROCEDURE — 94640 AIRWAY INHALATION TREATMENT: CPT

## 2017-01-01 PROCEDURE — 83880 ASSAY OF NATRIURETIC PEPTIDE: CPT | Performed by: INTERNAL MEDICINE

## 2017-01-01 PROCEDURE — 25010000002 PIPERACILLIN SOD-TAZOBACTAM PER 1 G

## 2017-01-01 PROCEDURE — 80048 BASIC METABOLIC PNL TOTAL CA: CPT | Performed by: HOSPITALIST

## 2017-01-01 PROCEDURE — 93010 ELECTROCARDIOGRAM REPORT: CPT | Performed by: INTERNAL MEDICINE

## 2017-01-01 PROCEDURE — 84484 ASSAY OF TROPONIN QUANT: CPT

## 2017-01-01 PROCEDURE — 99232 SBSQ HOSP IP/OBS MODERATE 35: CPT | Performed by: INTERNAL MEDICINE

## 2017-01-01 PROCEDURE — 36415 COLL VENOUS BLD VENIPUNCTURE: CPT

## 2017-01-01 PROCEDURE — 25010000002 ENOXAPARIN PER 10 MG: Performed by: INTERNAL MEDICINE

## 2017-01-01 PROCEDURE — 93005 ELECTROCARDIOGRAM TRACING: CPT

## 2017-01-01 PROCEDURE — 84145 PROCALCITONIN (PCT): CPT | Performed by: HOSPITALIST

## 2017-01-01 PROCEDURE — 83540 ASSAY OF IRON: CPT | Performed by: INTERNAL MEDICINE

## 2017-01-01 PROCEDURE — 25010000002 VANCOMYCIN

## 2017-01-01 PROCEDURE — 99222 1ST HOSP IP/OBS MODERATE 55: CPT | Performed by: INTERNAL MEDICINE

## 2017-01-01 PROCEDURE — 63710000001 INSULIN ASPART PER 5 UNITS: Performed by: INTERNAL MEDICINE

## 2017-01-01 PROCEDURE — 51702 INSERT TEMP BLADDER CATH: CPT

## 2017-01-01 PROCEDURE — 82550 ASSAY OF CK (CPK): CPT

## 2017-01-01 PROCEDURE — 99284 EMERGENCY DEPT VISIT MOD MDM: CPT

## 2017-01-01 PROCEDURE — 85025 COMPLETE CBC W/AUTO DIFF WBC: CPT

## 2017-01-01 PROCEDURE — 84145 PROCALCITONIN (PCT): CPT | Performed by: INTERNAL MEDICINE

## 2017-01-01 PROCEDURE — 87040 BLOOD CULTURE FOR BACTERIA: CPT | Performed by: HOSPITALIST

## 2017-01-01 PROCEDURE — 87633 RESP VIRUS 12-25 TARGETS: CPT | Performed by: HOSPITALIST

## 2017-01-01 PROCEDURE — 93306 TTE W/DOPPLER COMPLETE: CPT

## 2017-01-01 PROCEDURE — 25010000002 HYDROMORPHONE PER 4 MG: Performed by: INTERNAL MEDICINE

## 2017-01-01 PROCEDURE — 25010000002 METOCLOPRAMIDE PER 10 MG: Performed by: INTERNAL MEDICINE

## 2017-01-01 PROCEDURE — 93000 ELECTROCARDIOGRAM COMPLETE: CPT | Performed by: INTERNAL MEDICINE

## 2017-01-01 PROCEDURE — 74176 CT ABD & PELVIS W/O CONTRAST: CPT

## 2017-01-01 PROCEDURE — 25010000002 PIPERACILLIN SOD-TAZOBACTAM PER 1 G: Performed by: HOSPITALIST

## 2017-01-01 PROCEDURE — 80053 COMPREHEN METABOLIC PANEL: CPT

## 2017-01-01 PROCEDURE — 83880 ASSAY OF NATRIURETIC PEPTIDE: CPT | Performed by: HOSPITALIST

## 2017-01-01 PROCEDURE — 82746 ASSAY OF FOLIC ACID SERUM: CPT | Performed by: INTERNAL MEDICINE

## 2017-01-01 PROCEDURE — 63710000001 INSULIN DETEMER PER 5 UNITS: Performed by: INTERNAL MEDICINE

## 2017-01-01 PROCEDURE — 87633 RESP VIRUS 12-25 TARGETS: CPT | Performed by: INTERNAL MEDICINE

## 2017-01-01 PROCEDURE — 83880 ASSAY OF NATRIURETIC PEPTIDE: CPT

## 2017-01-01 PROCEDURE — 84466 ASSAY OF TRANSFERRIN: CPT | Performed by: INTERNAL MEDICINE

## 2017-01-01 PROCEDURE — 82607 VITAMIN B-12: CPT | Performed by: INTERNAL MEDICINE

## 2017-01-01 PROCEDURE — 25010000002 VANCOMYCIN: Performed by: INTERNAL MEDICINE

## 2017-01-01 PROCEDURE — 85025 COMPLETE CBC W/AUTO DIFF WBC: CPT | Performed by: EMERGENCY MEDICINE

## 2017-01-01 PROCEDURE — 81003 URINALYSIS AUTO W/O SCOPE: CPT

## 2017-01-01 PROCEDURE — 25010000002 METHYLPREDNISOLONE PER 125 MG: Performed by: EMERGENCY MEDICINE

## 2017-01-01 PROCEDURE — 93970 EXTREMITY STUDY: CPT

## 2017-01-01 PROCEDURE — 25010000002 FUROSEMIDE PER 20 MG

## 2017-01-01 PROCEDURE — 76857 US EXAM PELVIC LIMITED: CPT

## 2017-01-01 PROCEDURE — 36600 WITHDRAWAL OF ARTERIAL BLOOD: CPT

## 2017-01-01 PROCEDURE — 83735 ASSAY OF MAGNESIUM: CPT | Performed by: INTERNAL MEDICINE

## 2017-01-01 PROCEDURE — 87486 CHLMYD PNEUM DNA AMP PROBE: CPT | Performed by: INTERNAL MEDICINE

## 2017-01-01 PROCEDURE — 87581 M.PNEUMON DNA AMP PROBE: CPT | Performed by: HOSPITALIST

## 2017-01-01 PROCEDURE — 99231 SBSQ HOSP IP/OBS SF/LOW 25: CPT | Performed by: INTERNAL MEDICINE

## 2017-01-01 PROCEDURE — 87486 CHLMYD PNEUM DNA AMP PROBE: CPT | Performed by: HOSPITALIST

## 2017-01-01 PROCEDURE — 87581 M.PNEUMON DNA AMP PROBE: CPT | Performed by: INTERNAL MEDICINE

## 2017-01-01 PROCEDURE — 80053 COMPREHEN METABOLIC PANEL: CPT | Performed by: EMERGENCY MEDICINE

## 2017-01-01 PROCEDURE — 71010 HC CHEST PA OR AP: CPT

## 2017-01-01 PROCEDURE — 83036 HEMOGLOBIN GLYCOSYLATED A1C: CPT | Performed by: HOSPITALIST

## 2017-01-01 PROCEDURE — 83880 ASSAY OF NATRIURETIC PEPTIDE: CPT | Performed by: EMERGENCY MEDICINE

## 2017-01-01 PROCEDURE — 84132 ASSAY OF SERUM POTASSIUM: CPT | Performed by: INTERNAL MEDICINE

## 2017-01-01 PROCEDURE — 87798 DETECT AGENT NOS DNA AMP: CPT | Performed by: HOSPITALIST

## 2017-01-01 PROCEDURE — 82803 BLOOD GASES ANY COMBINATION: CPT

## 2017-01-01 PROCEDURE — 87081 CULTURE SCREEN ONLY: CPT | Performed by: HOSPITALIST

## 2017-01-01 PROCEDURE — 80202 ASSAY OF VANCOMYCIN: CPT | Performed by: HOSPITALIST

## 2017-01-01 PROCEDURE — 84443 ASSAY THYROID STIM HORMONE: CPT | Performed by: HOSPITALIST

## 2017-01-01 PROCEDURE — 99223 1ST HOSP IP/OBS HIGH 75: CPT | Performed by: INTERNAL MEDICINE

## 2017-01-01 PROCEDURE — 99285 EMERGENCY DEPT VISIT HI MDM: CPT

## 2017-01-01 PROCEDURE — 84484 ASSAY OF TROPONIN QUANT: CPT | Performed by: EMERGENCY MEDICINE

## 2017-01-01 PROCEDURE — 99213 OFFICE O/P EST LOW 20 MIN: CPT | Performed by: INTERNAL MEDICINE

## 2017-01-01 PROCEDURE — 87205 SMEAR GRAM STAIN: CPT | Performed by: INTERNAL MEDICINE

## 2017-01-01 PROCEDURE — 87070 CULTURE OTHR SPECIMN AEROBIC: CPT | Performed by: INTERNAL MEDICINE

## 2017-01-01 PROCEDURE — 86140 C-REACTIVE PROTEIN: CPT | Performed by: HOSPITALIST

## 2017-01-01 PROCEDURE — 25010000002 ONDANSETRON PER 1 MG: Performed by: INTERNAL MEDICINE

## 2017-01-01 PROCEDURE — 87798 DETECT AGENT NOS DNA AMP: CPT | Performed by: INTERNAL MEDICINE

## 2017-01-01 RX ORDER — POTASSIUM CHLORIDE 750 MG/1
10 TABLET, EXTENDED RELEASE ORAL DAILY
COMMUNITY
End: 2017-01-01 | Stop reason: HOSPADM

## 2017-01-01 RX ORDER — CARVEDILOL 12.5 MG/1
12.5 TABLET ORAL 2 TIMES DAILY WITH MEALS
Status: DISCONTINUED | OUTPATIENT
Start: 2017-01-01 | End: 2017-01-01

## 2017-01-01 RX ORDER — PANTOPRAZOLE SODIUM 40 MG/10ML
80 INJECTION, POWDER, LYOPHILIZED, FOR SOLUTION INTRAVENOUS ONCE
Status: COMPLETED | OUTPATIENT
Start: 2017-01-01 | End: 2017-01-01

## 2017-01-01 RX ORDER — ACETAMINOPHEN 325 MG/1
650 TABLET ORAL EVERY 6 HOURS PRN
COMMUNITY

## 2017-01-01 RX ORDER — NYSTATIN 100000 [USP'U]/G
1 POWDER TOPICAL 2 TIMES DAILY
COMMUNITY
End: 2017-01-01 | Stop reason: HOSPADM

## 2017-01-01 RX ORDER — GABAPENTIN 300 MG/1
300 CAPSULE ORAL EVERY 12 HOURS SCHEDULED
Status: DISCONTINUED | OUTPATIENT
Start: 2017-01-01 | End: 2017-03-30 | Stop reason: HOSPADM

## 2017-01-01 RX ORDER — ACETAMINOPHEN 650 MG/1
650 SUPPOSITORY RECTAL EVERY 4 HOURS PRN
Status: DISCONTINUED | OUTPATIENT
Start: 2017-01-01 | End: 2017-03-30 | Stop reason: HOSPADM

## 2017-01-01 RX ORDER — CARVEDILOL 12.5 MG/1
6.25 TABLET ORAL 2 TIMES DAILY WITH MEALS
Start: 2017-01-01

## 2017-01-01 RX ORDER — LORAZEPAM 2 MG/ML
0.5 CONCENTRATE ORAL
Status: DISCONTINUED | OUTPATIENT
Start: 2017-01-01 | End: 2017-03-30 | Stop reason: HOSPADM

## 2017-01-01 RX ORDER — CARVEDILOL 12.5 MG/1
12.5 TABLET ORAL EVERY 12 HOURS SCHEDULED
Status: DISCONTINUED | OUTPATIENT
Start: 2017-01-01 | End: 2017-01-01

## 2017-01-01 RX ORDER — POTASSIUM CHLORIDE 750 MG/1
10 CAPSULE, EXTENDED RELEASE ORAL DAILY
Status: DISCONTINUED | OUTPATIENT
Start: 2017-01-01 | End: 2017-01-01 | Stop reason: HOSPADM

## 2017-01-01 RX ORDER — PROMETHAZINE HYDROCHLORIDE 6.25 MG/5ML
6.25 SYRUP ORAL EVERY 4 HOURS PRN
Status: DISCONTINUED | OUTPATIENT
Start: 2017-01-01 | End: 2017-03-30 | Stop reason: HOSPADM

## 2017-01-01 RX ORDER — LISINOPRIL 5 MG/1
5 TABLET ORAL
Status: DISCONTINUED | OUTPATIENT
Start: 2017-01-01 | End: 2017-01-01

## 2017-01-01 RX ORDER — SODIUM CHLORIDE 0.9 % (FLUSH) 0.9 %
10 SYRINGE (ML) INJECTION AS NEEDED
Status: DISCONTINUED | OUTPATIENT
Start: 2017-01-01 | End: 2017-01-01 | Stop reason: HOSPADM

## 2017-01-01 RX ORDER — SODIUM CHLORIDE 0.9 % (FLUSH) 0.9 %
10 SYRINGE (ML) INJECTION AS NEEDED
Status: DISCONTINUED | OUTPATIENT
Start: 2017-01-01 | End: 2017-03-30 | Stop reason: HOSPADM

## 2017-01-01 RX ORDER — BUMETANIDE 0.25 MG/ML
2 INJECTION INTRAMUSCULAR; INTRAVENOUS EVERY 12 HOURS
Status: DISCONTINUED | OUTPATIENT
Start: 2017-01-01 | End: 2017-01-01

## 2017-01-01 RX ORDER — NICOTINE POLACRILEX 4 MG
15 LOZENGE BUCCAL
Status: DISCONTINUED | OUTPATIENT
Start: 2017-01-01 | End: 2017-01-01 | Stop reason: HOSPADM

## 2017-01-01 RX ORDER — HYDROCODONE BITARTRATE AND ACETAMINOPHEN 5; 325 MG/1; MG/1
1 TABLET ORAL EVERY 6 HOURS PRN
Status: DISCONTINUED | OUTPATIENT
Start: 2017-01-01 | End: 2017-01-01 | Stop reason: HOSPADM

## 2017-01-01 RX ORDER — DONEPEZIL HYDROCHLORIDE 5 MG/1
5 TABLET, FILM COATED ORAL NIGHTLY
COMMUNITY

## 2017-01-01 RX ORDER — ASPIRIN 81 MG/1
81 TABLET, CHEWABLE ORAL DAILY
COMMUNITY

## 2017-01-01 RX ORDER — LORAZEPAM 2 MG/ML
1 INJECTION INTRAMUSCULAR
Status: DISCONTINUED | OUTPATIENT
Start: 2017-01-01 | End: 2017-03-30 | Stop reason: HOSPADM

## 2017-01-01 RX ORDER — LORAZEPAM 2 MG/ML
2 CONCENTRATE ORAL
Status: DISCONTINUED | OUTPATIENT
Start: 2017-01-01 | End: 2017-03-30 | Stop reason: HOSPADM

## 2017-01-01 RX ORDER — BUMETANIDE 1 MG/1
1 TABLET ORAL 2 TIMES DAILY
Status: DISCONTINUED | OUTPATIENT
Start: 2017-01-01 | End: 2017-01-01

## 2017-01-01 RX ORDER — LORAZEPAM 2 MG/ML
2 INJECTION INTRAMUSCULAR
Status: DISCONTINUED | OUTPATIENT
Start: 2017-01-01 | End: 2017-03-30 | Stop reason: HOSPADM

## 2017-01-01 RX ORDER — NYSTATIN 100000 [USP'U]/G
POWDER TOPICAL 2 TIMES DAILY
Status: DISCONTINUED | OUTPATIENT
Start: 2017-01-01 | End: 2017-01-01 | Stop reason: HOSPADM

## 2017-01-01 RX ORDER — CARVEDILOL 12.5 MG/1
12.5 TABLET ORAL 2 TIMES DAILY WITH MEALS
Status: ON HOLD | COMMUNITY
End: 2017-01-01

## 2017-01-01 RX ORDER — BACITRACIN ZINC 500 [USP'U]/G
1 OINTMENT TOPICAL 2 TIMES DAILY
COMMUNITY

## 2017-01-01 RX ORDER — PROMETHAZINE HYDROCHLORIDE 25 MG/1
6.25 TABLET ORAL EVERY 4 HOURS PRN
Status: DISCONTINUED | OUTPATIENT
Start: 2017-01-01 | End: 2017-03-30 | Stop reason: HOSPADM

## 2017-01-01 RX ORDER — HALOPERIDOL 2 MG/ML
1 SOLUTION ORAL EVERY 4 HOURS PRN
Status: DISCONTINUED | OUTPATIENT
Start: 2017-01-01 | End: 2017-03-30 | Stop reason: HOSPADM

## 2017-01-01 RX ORDER — PROMETHAZINE HYDROCHLORIDE 25 MG/ML
6.25 INJECTION, SOLUTION INTRAMUSCULAR; INTRAVENOUS EVERY 4 HOURS PRN
Status: DISCONTINUED | OUTPATIENT
Start: 2017-01-01 | End: 2017-03-30 | Stop reason: HOSPADM

## 2017-01-01 RX ORDER — IPRATROPIUM BROMIDE AND ALBUTEROL SULFATE 2.5; .5 MG/3ML; MG/3ML
3 SOLUTION RESPIRATORY (INHALATION)
Status: DISCONTINUED | OUTPATIENT
Start: 2017-01-01 | End: 2017-01-01

## 2017-01-01 RX ORDER — GLYCOPYRROLATE 0.2 MG/ML
0.2 INJECTION INTRAMUSCULAR; INTRAVENOUS
Status: DISCONTINUED | OUTPATIENT
Start: 2017-01-01 | End: 2017-03-30 | Stop reason: HOSPADM

## 2017-01-01 RX ORDER — ASPIRIN 81 MG/1
81 TABLET, CHEWABLE ORAL DAILY
Status: DISCONTINUED | OUTPATIENT
Start: 2017-01-01 | End: 2017-01-01 | Stop reason: HOSPADM

## 2017-01-01 RX ORDER — HALOPERIDOL 1 MG/1
1 TABLET ORAL EVERY 4 HOURS PRN
Status: DISCONTINUED | OUTPATIENT
Start: 2017-01-01 | End: 2017-03-30 | Stop reason: HOSPADM

## 2017-01-01 RX ORDER — PROMETHAZINE HYDROCHLORIDE 12.5 MG/1
6.25 SUPPOSITORY RECTAL EVERY 4 HOURS PRN
Status: DISCONTINUED | OUTPATIENT
Start: 2017-01-01 | End: 2017-03-30 | Stop reason: HOSPADM

## 2017-01-01 RX ORDER — METOCLOPRAMIDE HYDROCHLORIDE 5 MG/ML
10 INJECTION INTRAMUSCULAR; INTRAVENOUS EVERY 6 HOURS
Status: DISCONTINUED | OUTPATIENT
Start: 2017-01-01 | End: 2017-01-01

## 2017-01-01 RX ORDER — DIPHENOXYLATE HYDROCHLORIDE AND ATROPINE SULFATE 2.5; .025 MG/1; MG/1
1 TABLET ORAL
Status: DISCONTINUED | OUTPATIENT
Start: 2017-01-01 | End: 2017-03-30 | Stop reason: HOSPADM

## 2017-01-01 RX ORDER — TROLAMINE SALICYLATE 10 G/100G
CREAM TOPICAL 2 TIMES DAILY
Status: DISCONTINUED | OUTPATIENT
Start: 2017-01-01 | End: 2017-01-01

## 2017-01-01 RX ORDER — DONEPEZIL HYDROCHLORIDE 5 MG/1
5 TABLET, FILM COATED ORAL NIGHTLY
Status: DISCONTINUED | OUTPATIENT
Start: 2017-01-01 | End: 2017-01-01 | Stop reason: HOSPADM

## 2017-01-01 RX ORDER — HALOPERIDOL 5 MG/ML
1 INJECTION INTRAMUSCULAR EVERY 4 HOURS PRN
Status: DISCONTINUED | OUTPATIENT
Start: 2017-01-01 | End: 2017-03-30 | Stop reason: HOSPADM

## 2017-01-01 RX ORDER — BUMETANIDE 0.25 MG/ML
2 INJECTION INTRAMUSCULAR; INTRAVENOUS 2 TIMES DAILY
Status: DISCONTINUED | OUTPATIENT
Start: 2017-01-01 | End: 2017-01-01

## 2017-01-01 RX ORDER — GABAPENTIN 100 MG/1
100 CAPSULE ORAL EVERY 12 HOURS SCHEDULED
Start: 2017-01-01

## 2017-01-01 RX ORDER — BUMETANIDE 0.25 MG/ML
2 INJECTION INTRAMUSCULAR; INTRAVENOUS ONCE
Status: COMPLETED | OUTPATIENT
Start: 2017-01-01 | End: 2017-01-01

## 2017-01-01 RX ORDER — DONEPEZIL HYDROCHLORIDE 5 MG/1
5 TABLET, FILM COATED ORAL NIGHTLY
Status: DISCONTINUED | OUTPATIENT
Start: 2017-01-01 | End: 2017-01-01

## 2017-01-01 RX ORDER — CARVEDILOL 6.25 MG/1
6.25 TABLET ORAL 2 TIMES DAILY WITH MEALS
Status: DISCONTINUED | OUTPATIENT
Start: 2017-01-01 | End: 2017-01-01 | Stop reason: HOSPADM

## 2017-01-01 RX ORDER — LORAZEPAM 1 MG/1
2 TABLET ORAL
Status: DISCONTINUED | OUTPATIENT
Start: 2017-01-01 | End: 2017-03-30 | Stop reason: HOSPADM

## 2017-01-01 RX ORDER — BUDESONIDE AND FORMOTEROL FUMARATE DIHYDRATE 160; 4.5 UG/1; UG/1
2 AEROSOL RESPIRATORY (INHALATION)
Status: DISCONTINUED | OUTPATIENT
Start: 2017-01-01 | End: 2017-01-01

## 2017-01-01 RX ORDER — PANTOPRAZOLE SODIUM 40 MG/1
40 TABLET, DELAYED RELEASE ORAL
Status: DISCONTINUED | OUTPATIENT
Start: 2017-01-01 | End: 2017-01-01

## 2017-01-01 RX ORDER — ASPIRIN 81 MG/1
81 TABLET ORAL DAILY
Status: DISCONTINUED | OUTPATIENT
Start: 2017-01-01 | End: 2017-01-01

## 2017-01-01 RX ORDER — LISINOPRIL 5 MG/1
5 TABLET ORAL DAILY
COMMUNITY

## 2017-01-01 RX ORDER — METHYLPREDNISOLONE SODIUM SUCCINATE 125 MG/2ML
125 INJECTION, POWDER, LYOPHILIZED, FOR SOLUTION INTRAMUSCULAR; INTRAVENOUS ONCE
Status: COMPLETED | OUTPATIENT
Start: 2017-01-01 | End: 2017-01-01

## 2017-01-01 RX ORDER — ACETAMINOPHEN 325 MG/1
650 TABLET ORAL EVERY 4 HOURS PRN
Status: DISCONTINUED | OUTPATIENT
Start: 2017-01-01 | End: 2017-03-30 | Stop reason: HOSPADM

## 2017-01-01 RX ORDER — ACETAMINOPHEN 325 MG/1
650 TABLET ORAL EVERY 6 HOURS PRN
Status: DISCONTINUED | OUTPATIENT
Start: 2017-01-01 | End: 2017-01-01

## 2017-01-01 RX ORDER — FUROSEMIDE 10 MG/ML
40 INJECTION INTRAMUSCULAR; INTRAVENOUS ONCE
Status: COMPLETED | OUTPATIENT
Start: 2017-01-01 | End: 2017-01-01

## 2017-01-01 RX ORDER — LORAZEPAM 2 MG/ML
1 CONCENTRATE ORAL
Status: DISCONTINUED | OUTPATIENT
Start: 2017-01-01 | End: 2017-03-30 | Stop reason: HOSPADM

## 2017-01-01 RX ORDER — LORAZEPAM 1 MG/1
1 TABLET ORAL
Status: DISCONTINUED | OUTPATIENT
Start: 2017-01-01 | End: 2017-03-30 | Stop reason: HOSPADM

## 2017-01-01 RX ORDER — BUMETANIDE 2 MG/1
2 TABLET ORAL 2 TIMES DAILY
Status: ON HOLD | COMMUNITY
End: 2017-01-01

## 2017-01-01 RX ORDER — BUMETANIDE 2 MG/1
2 TABLET ORAL 2 TIMES DAILY
Status: DISCONTINUED | OUTPATIENT
Start: 2017-01-01 | End: 2017-01-01

## 2017-01-01 RX ORDER — GLYCOPYRROLATE 0.2 MG/ML
0.4 INJECTION INTRAMUSCULAR; INTRAVENOUS
Status: DISCONTINUED | OUTPATIENT
Start: 2017-01-01 | End: 2017-03-30 | Stop reason: HOSPADM

## 2017-01-01 RX ORDER — ACETAMINOPHEN 160 MG/5ML
650 SOLUTION ORAL EVERY 4 HOURS PRN
Status: DISCONTINUED | OUTPATIENT
Start: 2017-01-01 | End: 2017-03-30 | Stop reason: HOSPADM

## 2017-01-01 RX ORDER — BUMETANIDE 0.25 MG/ML
2 INJECTION INTRAMUSCULAR; INTRAVENOUS EVERY 8 HOURS
Status: DISCONTINUED | OUTPATIENT
Start: 2017-01-01 | End: 2017-01-01

## 2017-01-01 RX ORDER — LISINOPRIL 5 MG/1
5 TABLET ORAL DAILY
Status: DISCONTINUED | OUTPATIENT
Start: 2017-01-01 | End: 2017-01-01 | Stop reason: HOSPADM

## 2017-01-01 RX ORDER — BUDESONIDE 1 MG/2ML
1 INHALANT ORAL
Status: DISCONTINUED | OUTPATIENT
Start: 2017-01-01 | End: 2017-01-01

## 2017-01-01 RX ORDER — LORAZEPAM 2 MG/ML
0.5 INJECTION INTRAMUSCULAR
Status: DISCONTINUED | OUTPATIENT
Start: 2017-01-01 | End: 2017-03-30 | Stop reason: HOSPADM

## 2017-01-01 RX ORDER — GABAPENTIN 100 MG/1
100 CAPSULE ORAL EVERY 12 HOURS SCHEDULED
Status: DISCONTINUED | OUTPATIENT
Start: 2017-01-01 | End: 2017-01-01 | Stop reason: HOSPADM

## 2017-01-01 RX ORDER — BUMETANIDE 2 MG/1
2 TABLET ORAL DAILY
Start: 2017-01-01

## 2017-01-01 RX ORDER — SCOLOPAMINE TRANSDERMAL SYSTEM 1 MG/1
1 PATCH, EXTENDED RELEASE TRANSDERMAL
Status: DISCONTINUED | OUTPATIENT
Start: 2017-01-01 | End: 2017-03-30 | Stop reason: HOSPADM

## 2017-01-01 RX ORDER — DEXTROSE MONOHYDRATE 25 G/50ML
25 INJECTION, SOLUTION INTRAVENOUS
Status: DISCONTINUED | OUTPATIENT
Start: 2017-01-01 | End: 2017-01-01 | Stop reason: HOSPADM

## 2017-01-01 RX ORDER — LORAZEPAM 0.5 MG/1
0.5 TABLET ORAL
Status: DISCONTINUED | OUTPATIENT
Start: 2017-01-01 | End: 2017-03-30 | Stop reason: HOSPADM

## 2017-01-01 RX ORDER — ONDANSETRON 2 MG/ML
4 INJECTION INTRAMUSCULAR; INTRAVENOUS EVERY 6 HOURS PRN
Status: DISCONTINUED | OUTPATIENT
Start: 2017-01-01 | End: 2017-01-01

## 2017-01-01 RX ORDER — BUDESONIDE AND FORMOTEROL FUMARATE DIHYDRATE 160; 4.5 UG/1; UG/1
2 AEROSOL RESPIRATORY (INHALATION)
Status: DISCONTINUED | OUTPATIENT
Start: 2017-01-01 | End: 2017-01-01 | Stop reason: HOSPADM

## 2017-01-01 RX ORDER — BUDESONIDE AND FORMOTEROL FUMARATE DIHYDRATE 160; 4.5 UG/1; UG/1
2 AEROSOL RESPIRATORY (INHALATION)
Refills: 12
Start: 2017-01-01

## 2017-01-01 RX ADMIN — MICONAZOLE NITRATE: 20 CREAM TOPICAL at 08:36

## 2017-01-01 RX ADMIN — NYSTATIN: 100000 POWDER TOPICAL at 09:43

## 2017-01-01 RX ADMIN — NYSTATIN: 100000 POWDER TOPICAL at 20:54

## 2017-01-01 RX ADMIN — ASPIRIN 81 MG: 81 TABLET, CHEWABLE ORAL at 08:18

## 2017-01-01 RX ADMIN — INSULIN ASPART 5 UNITS: 100 INJECTION, SOLUTION INTRAVENOUS; SUBCUTANEOUS at 08:37

## 2017-01-01 RX ADMIN — BUMETANIDE 1 MG: 2 TABLET ORAL at 17:09

## 2017-01-01 RX ADMIN — DONEPEZIL HYDROCHLORIDE 5 MG: 5 TABLET, FILM COATED ORAL at 22:08

## 2017-01-01 RX ADMIN — PANTOPRAZOLE SODIUM 8 MG/HR: 40 INJECTION, POWDER, FOR SOLUTION INTRAVENOUS at 01:47

## 2017-01-01 RX ADMIN — ENOXAPARIN SODIUM 30 MG: 30 INJECTION SUBCUTANEOUS at 20:56

## 2017-01-01 RX ADMIN — INSULIN ASPART 5 UNITS: 100 INJECTION, SOLUTION INTRAVENOUS; SUBCUTANEOUS at 12:14

## 2017-01-01 RX ADMIN — POTASSIUM CHLORIDE 10 MEQ: 750 CAPSULE, EXTENDED RELEASE ORAL at 08:18

## 2017-01-01 RX ADMIN — INSULIN ASPART 2 UNITS: 100 INJECTION, SOLUTION INTRAVENOUS; SUBCUTANEOUS at 18:26

## 2017-01-01 RX ADMIN — ASPIRIN 81 MG: 81 TABLET, CHEWABLE ORAL at 09:22

## 2017-01-01 RX ADMIN — MICONAZOLE NITRATE: 20 CREAM TOPICAL at 09:24

## 2017-01-01 RX ADMIN — BUDESONIDE AND FORMOTEROL FUMARATE DIHYDRATE 2 PUFF: 160; 4.5 AEROSOL RESPIRATORY (INHALATION) at 21:31

## 2017-01-01 RX ADMIN — CARVEDILOL 12.5 MG: 12.5 TABLET, FILM COATED ORAL at 17:27

## 2017-01-01 RX ADMIN — NYSTATIN: 100000 POWDER TOPICAL at 09:24

## 2017-01-01 RX ADMIN — BUDESONIDE AND FORMOTEROL FUMARATE DIHYDRATE 2 PUFF: 160; 4.5 AEROSOL RESPIRATORY (INHALATION) at 20:35

## 2017-01-01 RX ADMIN — POTASSIUM CHLORIDE 10 MEQ: 750 CAPSULE, EXTENDED RELEASE ORAL at 09:22

## 2017-01-01 RX ADMIN — CARVEDILOL 12.5 MG: 12.5 TABLET, FILM COATED ORAL at 18:30

## 2017-01-01 RX ADMIN — SCOPALAMINE 1 PATCH: 1 PATCH, EXTENDED RELEASE TRANSDERMAL at 11:27

## 2017-01-01 RX ADMIN — FLUOCINONIDE: 0.5 CREAM TOPICAL at 10:51

## 2017-01-01 RX ADMIN — DONEPEZIL HYDROCHLORIDE 5 MG: 5 TABLET, FILM COATED ORAL at 22:05

## 2017-01-01 RX ADMIN — NYSTATIN: 100000 POWDER TOPICAL at 09:32

## 2017-01-01 RX ADMIN — ENOXAPARIN SODIUM 30 MG: 30 INJECTION SUBCUTANEOUS at 20:35

## 2017-01-01 RX ADMIN — POTASSIUM CHLORIDE 10 MEQ: 750 CAPSULE, EXTENDED RELEASE ORAL at 10:01

## 2017-01-01 RX ADMIN — MICONAZOLE NITRATE: 20 CREAM TOPICAL at 17:20

## 2017-01-01 RX ADMIN — ENOXAPARIN SODIUM 30 MG: 30 INJECTION SUBCUTANEOUS at 22:13

## 2017-01-01 RX ADMIN — LISINOPRIL 5 MG: 5 TABLET ORAL at 10:42

## 2017-01-01 RX ADMIN — BUMETANIDE 2 MG: 0.25 INJECTION INTRAMUSCULAR; INTRAVENOUS at 10:28

## 2017-01-01 RX ADMIN — FLUOCINONIDE: 0.5 CREAM TOPICAL at 09:32

## 2017-01-01 RX ADMIN — MICONAZOLE NITRATE: 20 CREAM TOPICAL at 22:15

## 2017-01-01 RX ADMIN — DONEPEZIL HYDROCHLORIDE 5 MG: 5 TABLET, FILM COATED ORAL at 21:19

## 2017-01-01 RX ADMIN — CARVEDILOL 12.5 MG: 12.5 TABLET, FILM COATED ORAL at 20:50

## 2017-01-01 RX ADMIN — ONDANSETRON 4 MG: 2 INJECTION INTRAMUSCULAR; INTRAVENOUS at 17:45

## 2017-01-01 RX ADMIN — BUDESONIDE AND FORMOTEROL FUMARATE DIHYDRATE 2 PUFF: 160; 4.5 AEROSOL RESPIRATORY (INHALATION) at 20:57

## 2017-01-01 RX ADMIN — CARVEDILOL 12.5 MG: 12.5 TABLET, FILM COATED ORAL at 18:27

## 2017-01-01 RX ADMIN — FLUOCINONIDE: 0.5 CREAM TOPICAL at 09:55

## 2017-01-01 RX ADMIN — FLUOCINONIDE: 0.5 CREAM TOPICAL at 18:30

## 2017-01-01 RX ADMIN — GABAPENTIN 100 MG: 100 CAPSULE ORAL at 20:54

## 2017-01-01 RX ADMIN — BUDESONIDE AND FORMOTEROL FUMARATE DIHYDRATE 2 PUFF: 160; 4.5 AEROSOL RESPIRATORY (INHALATION) at 22:57

## 2017-01-01 RX ADMIN — FLUOCINONIDE: 0.5 CREAM TOPICAL at 17:21

## 2017-01-01 RX ADMIN — PANTOPRAZOLE SODIUM 40 MG: 40 TABLET, DELAYED RELEASE ORAL at 05:59

## 2017-01-01 RX ADMIN — POTASSIUM CHLORIDE 10 MEQ: 750 CAPSULE, EXTENDED RELEASE ORAL at 09:43

## 2017-01-01 RX ADMIN — DONEPEZIL HYDROCHLORIDE 5 MG: 5 TABLET, FILM COATED ORAL at 21:53

## 2017-01-01 RX ADMIN — NYSTATIN: 100000 POWDER TOPICAL at 08:41

## 2017-01-01 RX ADMIN — BUDESONIDE AND FORMOTEROL FUMARATE DIHYDRATE 2 PUFF: 160; 4.5 AEROSOL RESPIRATORY (INHALATION) at 20:40

## 2017-01-01 RX ADMIN — DOXYCYCLINE 100 MG: 100 INJECTION, POWDER, LYOPHILIZED, FOR SOLUTION INTRAVENOUS at 01:39

## 2017-01-01 RX ADMIN — INSULIN ASPART 2 UNITS: 100 INJECTION, SOLUTION INTRAVENOUS; SUBCUTANEOUS at 17:18

## 2017-01-01 RX ADMIN — INSULIN ASPART 5 UNITS: 100 INJECTION, SOLUTION INTRAVENOUS; SUBCUTANEOUS at 11:51

## 2017-01-01 RX ADMIN — GABAPENTIN 100 MG: 100 CAPSULE ORAL at 22:12

## 2017-01-01 RX ADMIN — BUMETANIDE 1 MG: 1 TABLET ORAL at 18:26

## 2017-01-01 RX ADMIN — GABAPENTIN 100 MG: 100 CAPSULE ORAL at 08:17

## 2017-01-01 RX ADMIN — METOCLOPRAMIDE 10 MG: 5 INJECTION, SOLUTION INTRAMUSCULAR; INTRAVENOUS at 20:37

## 2017-01-01 RX ADMIN — BUMETANIDE 1 MG/HR: 0.25 INJECTION INTRAMUSCULAR; INTRAVENOUS at 07:45

## 2017-01-01 RX ADMIN — VANCOMYCIN HYDROCHLORIDE 500 MG: 500 INJECTION, POWDER, LYOPHILIZED, FOR SOLUTION INTRAVENOUS at 11:12

## 2017-01-01 RX ADMIN — POTASSIUM CHLORIDE 10 MEQ: 750 CAPSULE, EXTENDED RELEASE ORAL at 08:35

## 2017-01-01 RX ADMIN — FLUOCINONIDE: 0.5 CREAM TOPICAL at 09:24

## 2017-01-01 RX ADMIN — INSULIN ASPART 2 UNITS: 100 INJECTION, SOLUTION INTRAVENOUS; SUBCUTANEOUS at 22:15

## 2017-01-01 RX ADMIN — BUMETANIDE 1 MG: 2 TABLET ORAL at 10:01

## 2017-01-01 RX ADMIN — LISINOPRIL 5 MG: 5 TABLET ORAL at 09:43

## 2017-01-01 RX ADMIN — BUDESONIDE AND FORMOTEROL FUMARATE DIHYDRATE 2 PUFF: 160; 4.5 AEROSOL RESPIRATORY (INHALATION) at 07:13

## 2017-01-01 RX ADMIN — DONEPEZIL HYDROCHLORIDE 5 MG: 5 TABLET, FILM COATED ORAL at 20:54

## 2017-01-01 RX ADMIN — ASPIRIN 81 MG: 81 TABLET, CHEWABLE ORAL at 08:40

## 2017-01-01 RX ADMIN — ENOXAPARIN SODIUM 30 MG: 30 INJECTION SUBCUTANEOUS at 21:15

## 2017-01-01 RX ADMIN — NYSTATIN: 100000 POWDER TOPICAL at 10:50

## 2017-01-01 RX ADMIN — BUDESONIDE AND FORMOTEROL FUMARATE DIHYDRATE 2 PUFF: 160; 4.5 AEROSOL RESPIRATORY (INHALATION) at 09:01

## 2017-01-01 RX ADMIN — MICONAZOLE NITRATE: 20 CREAM TOPICAL at 10:02

## 2017-01-01 RX ADMIN — METOCLOPRAMIDE 10 MG: 5 INJECTION, SOLUTION INTRAMUSCULAR; INTRAVENOUS at 16:42

## 2017-01-01 RX ADMIN — INSULIN ASPART 5 UNITS: 100 INJECTION, SOLUTION INTRAVENOUS; SUBCUTANEOUS at 17:46

## 2017-01-01 RX ADMIN — LISINOPRIL 5 MG: 5 TABLET ORAL at 10:51

## 2017-01-01 RX ADMIN — INSULIN ASPART 5 UNITS: 100 INJECTION, SOLUTION INTRAVENOUS; SUBCUTANEOUS at 17:19

## 2017-01-01 RX ADMIN — BUDESONIDE AND FORMOTEROL FUMARATE DIHYDRATE 2 PUFF: 160; 4.5 AEROSOL RESPIRATORY (INHALATION) at 07:22

## 2017-01-01 RX ADMIN — ASPIRIN 81 MG: 81 TABLET, CHEWABLE ORAL at 10:42

## 2017-01-01 RX ADMIN — CARVEDILOL 12.5 MG: 12.5 TABLET, FILM COATED ORAL at 10:01

## 2017-01-01 RX ADMIN — MENTHOL, CAMPHOR: 1.11; 1.11 LOTION TOPICAL at 17:11

## 2017-01-01 RX ADMIN — DONEPEZIL HYDROCHLORIDE 5 MG: 5 TABLET, FILM COATED ORAL at 22:32

## 2017-01-01 RX ADMIN — BUMETANIDE 2 MG: 2 TABLET ORAL at 17:19

## 2017-01-01 RX ADMIN — LISINOPRIL 5 MG: 5 TABLET ORAL at 08:35

## 2017-01-01 RX ADMIN — FLUOCINONIDE: 0.5 CREAM TOPICAL at 17:20

## 2017-01-01 RX ADMIN — MICONAZOLE NITRATE: 20 CREAM TOPICAL at 09:43

## 2017-01-01 RX ADMIN — INSULIN ASPART 5 UNITS: 100 INJECTION, SOLUTION INTRAVENOUS; SUBCUTANEOUS at 17:20

## 2017-01-01 RX ADMIN — BUDESONIDE AND FORMOTEROL FUMARATE DIHYDRATE 2 PUFF: 160; 4.5 AEROSOL RESPIRATORY (INHALATION) at 08:15

## 2017-01-01 RX ADMIN — INSULIN ASPART 5 UNITS: 100 INJECTION, SOLUTION INTRAVENOUS; SUBCUTANEOUS at 12:39

## 2017-01-01 RX ADMIN — MENTHOL, CAMPHOR: 1.11; 1.11 LOTION TOPICAL at 09:54

## 2017-01-01 RX ADMIN — INSULIN ASPART 5 UNITS: 100 INJECTION, SOLUTION INTRAVENOUS; SUBCUTANEOUS at 12:26

## 2017-01-01 RX ADMIN — INSULIN ASPART 5 UNITS: 100 INJECTION, SOLUTION INTRAVENOUS; SUBCUTANEOUS at 17:09

## 2017-01-01 RX ADMIN — LISINOPRIL 5 MG: 5 TABLET ORAL at 08:40

## 2017-01-01 RX ADMIN — ENOXAPARIN SODIUM 30 MG: 30 INJECTION SUBCUTANEOUS at 22:08

## 2017-01-01 RX ADMIN — MICONAZOLE NITRATE: 20 CREAM TOPICAL at 10:47

## 2017-01-01 RX ADMIN — INSULIN ASPART 5 UNITS: 100 INJECTION, SOLUTION INTRAVENOUS; SUBCUTANEOUS at 09:23

## 2017-01-01 RX ADMIN — INSULIN ASPART 5 UNITS: 100 INJECTION, SOLUTION INTRAVENOUS; SUBCUTANEOUS at 13:12

## 2017-01-01 RX ADMIN — HYDROMORPHONE HYDROCHLORIDE 1 MG: 1 INJECTION, SOLUTION INTRAMUSCULAR; INTRAVENOUS; SUBCUTANEOUS at 20:48

## 2017-01-01 RX ADMIN — BUDESONIDE AND FORMOTEROL FUMARATE DIHYDRATE 2 PUFF: 160; 4.5 AEROSOL RESPIRATORY (INHALATION) at 07:33

## 2017-01-01 RX ADMIN — NYSTATIN: 100000 POWDER TOPICAL at 18:26

## 2017-01-01 RX ADMIN — IPRATROPIUM BROMIDE AND ALBUTEROL SULFATE 3 ML: .5; 3 SOLUTION RESPIRATORY (INHALATION) at 20:40

## 2017-01-01 RX ADMIN — MICONAZOLE NITRATE: 20 CREAM TOPICAL at 08:41

## 2017-01-01 RX ADMIN — BUDESONIDE AND FORMOTEROL FUMARATE DIHYDRATE 2 PUFF: 160; 4.5 AEROSOL RESPIRATORY (INHALATION) at 10:00

## 2017-01-01 RX ADMIN — BUDESONIDE AND FORMOTEROL FUMARATE DIHYDRATE 2 PUFF: 160; 4.5 AEROSOL RESPIRATORY (INHALATION) at 09:08

## 2017-01-01 RX ADMIN — FLUOCINONIDE: 0.5 CREAM TOPICAL at 08:18

## 2017-01-01 RX ADMIN — METHYLPREDNISOLONE SODIUM SUCCINATE 125 MG: 125 INJECTION, POWDER, FOR SOLUTION INTRAMUSCULAR; INTRAVENOUS at 03:32

## 2017-01-01 RX ADMIN — INSULIN DETEMIR 26 UNITS: 100 INJECTION, SOLUTION SUBCUTANEOUS at 22:38

## 2017-01-01 RX ADMIN — ASPIRIN 81 MG: 81 TABLET, CHEWABLE ORAL at 10:01

## 2017-01-01 RX ADMIN — MICONAZOLE NITRATE: 20 CREAM TOPICAL at 17:47

## 2017-01-01 RX ADMIN — INSULIN ASPART 5 UNITS: 100 INJECTION, SOLUTION INTRAVENOUS; SUBCUTANEOUS at 17:27

## 2017-01-01 RX ADMIN — CARVEDILOL 12.5 MG: 12.5 TABLET, FILM COATED ORAL at 17:22

## 2017-01-01 RX ADMIN — DONEPEZIL HYDROCHLORIDE 5 MG: 5 TABLET, FILM COATED ORAL at 20:50

## 2017-01-01 RX ADMIN — BUMETANIDE 2 MG: 0.25 INJECTION INTRAMUSCULAR; INTRAVENOUS at 17:45

## 2017-01-01 RX ADMIN — IPRATROPIUM BROMIDE AND ALBUTEROL SULFATE 3 ML: .5; 3 SOLUTION RESPIRATORY (INHALATION) at 23:03

## 2017-01-01 RX ADMIN — MICONAZOLE NITRATE: 20 CREAM TOPICAL at 09:55

## 2017-01-01 RX ADMIN — DONEPEZIL HYDROCHLORIDE 5 MG: 5 TABLET, FILM COATED ORAL at 23:41

## 2017-01-01 RX ADMIN — METOCLOPRAMIDE 10 MG: 5 INJECTION, SOLUTION INTRAMUSCULAR; INTRAVENOUS at 09:17

## 2017-01-01 RX ADMIN — NYSTATIN: 100000 POWDER TOPICAL at 08:18

## 2017-01-01 RX ADMIN — FLUOCINONIDE: 0.5 CREAM TOPICAL at 17:11

## 2017-01-01 RX ADMIN — LISINOPRIL 5 MG: 5 TABLET ORAL at 10:01

## 2017-01-01 RX ADMIN — PANTOPRAZOLE SODIUM 80 MG: 40 INJECTION, POWDER, FOR SOLUTION INTRAVENOUS at 22:31

## 2017-01-01 RX ADMIN — DONEPEZIL HYDROCHLORIDE 5 MG: 5 TABLET, FILM COATED ORAL at 22:15

## 2017-01-01 RX ADMIN — INSULIN DETEMIR 28 UNITS: 100 INJECTION, SOLUTION SUBCUTANEOUS at 20:37

## 2017-01-01 RX ADMIN — FUROSEMIDE 40 MG: 10 INJECTION, SOLUTION INTRAMUSCULAR; INTRAVENOUS at 18:54

## 2017-01-01 RX ADMIN — ENOXAPARIN SODIUM 30 MG: 30 INJECTION SUBCUTANEOUS at 21:33

## 2017-01-01 RX ADMIN — CARVEDILOL 12.5 MG: 12.5 TABLET, FILM COATED ORAL at 17:46

## 2017-01-01 RX ADMIN — CARVEDILOL 12.5 MG: 12.5 TABLET, FILM COATED ORAL at 21:26

## 2017-01-01 RX ADMIN — METOCLOPRAMIDE 10 MG: 5 INJECTION, SOLUTION INTRAMUSCULAR; INTRAVENOUS at 02:36

## 2017-01-01 RX ADMIN — BUMETANIDE 2 MG: 0.25 INJECTION INTRAMUSCULAR; INTRAVENOUS at 09:17

## 2017-01-01 RX ADMIN — MICONAZOLE NITRATE: 20 CREAM TOPICAL at 17:11

## 2017-01-01 RX ADMIN — GABAPENTIN 100 MG: 100 CAPSULE ORAL at 12:25

## 2017-01-01 RX ADMIN — CARVEDILOL 12.5 MG: 12.5 TABLET, FILM COATED ORAL at 08:18

## 2017-01-01 RX ADMIN — GABAPENTIN 300 MG: 300 CAPSULE ORAL at 21:25

## 2017-01-01 RX ADMIN — POTASSIUM CHLORIDE 10 MEQ: 750 CAPSULE, EXTENDED RELEASE ORAL at 10:51

## 2017-01-01 RX ADMIN — ASPIRIN 81 MG: 81 TABLET, CHEWABLE ORAL at 09:43

## 2017-01-01 RX ADMIN — GABAPENTIN 100 MG: 100 CAPSULE ORAL at 22:05

## 2017-01-01 RX ADMIN — TAZOBACTAM SODIUM AND PIPERACILLIN SODIUM 4.5 G: 500; 4 INJECTION, SOLUTION INTRAVENOUS at 18:54

## 2017-01-01 RX ADMIN — TAZOBACTAM SODIUM AND PIPERACILLIN SODIUM 3.38 G: 375; 3 INJECTION, SOLUTION INTRAVENOUS at 12:39

## 2017-01-01 RX ADMIN — BUDESONIDE AND FORMOTEROL FUMARATE DIHYDRATE 2 PUFF: 160; 4.5 AEROSOL RESPIRATORY (INHALATION) at 20:37

## 2017-01-01 RX ADMIN — DONEPEZIL HYDROCHLORIDE 5 MG: 5 TABLET, FILM COATED ORAL at 00:04

## 2017-01-01 RX ADMIN — LISINOPRIL 5 MG: 5 TABLET ORAL at 09:22

## 2017-01-01 RX ADMIN — HYDROMORPHONE HYDROCHLORIDE 1 MG: 1 INJECTION, SOLUTION INTRAMUSCULAR; INTRAVENOUS; SUBCUTANEOUS at 17:32

## 2017-01-01 RX ADMIN — ENOXAPARIN SODIUM 30 MG: 30 INJECTION SUBCUTANEOUS at 21:19

## 2017-01-01 RX ADMIN — MICONAZOLE NITRATE: 20 CREAM TOPICAL at 18:08

## 2017-01-01 RX ADMIN — GABAPENTIN 100 MG: 100 CAPSULE ORAL at 21:53

## 2017-01-01 RX ADMIN — INSULIN ASPART 5 UNITS: 100 INJECTION, SOLUTION INTRAVENOUS; SUBCUTANEOUS at 18:27

## 2017-01-01 RX ADMIN — CARVEDILOL 12.5 MG: 12.5 TABLET, FILM COATED ORAL at 09:43

## 2017-01-01 RX ADMIN — ENOXAPARIN SODIUM 30 MG: 30 INJECTION SUBCUTANEOUS at 23:42

## 2017-01-01 RX ADMIN — POTASSIUM CHLORIDE 10 MEQ: 750 CAPSULE, EXTENDED RELEASE ORAL at 10:43

## 2017-01-01 RX ADMIN — MENTHOL, CAMPHOR: 1.11; 1.11 LOTION TOPICAL at 18:27

## 2017-01-01 RX ADMIN — BUMETANIDE 2 MG: 0.25 INJECTION, SOLUTION INTRAMUSCULAR; INTRAVENOUS at 03:04

## 2017-01-01 RX ADMIN — CARVEDILOL 12.5 MG: 12.5 TABLET, FILM COATED ORAL at 10:42

## 2017-01-01 RX ADMIN — TAZOBACTAM SODIUM AND PIPERACILLIN SODIUM 3.38 G: 375; 3 INJECTION, SOLUTION INTRAVENOUS at 06:24

## 2017-01-01 RX ADMIN — METOCLOPRAMIDE 10 MG: 5 INJECTION, SOLUTION INTRAMUSCULAR; INTRAVENOUS at 04:47

## 2017-01-01 RX ADMIN — FLUOCINONIDE: 0.5 CREAM TOPICAL at 09:43

## 2017-01-01 RX ADMIN — INSULIN ASPART 5 UNITS: 100 INJECTION, SOLUTION INTRAVENOUS; SUBCUTANEOUS at 09:31

## 2017-01-01 RX ADMIN — CARVEDILOL 12.5 MG: 12.5 TABLET, FILM COATED ORAL at 08:17

## 2017-01-01 RX ADMIN — ENOXAPARIN SODIUM 30 MG: 30 INJECTION SUBCUTANEOUS at 20:53

## 2017-01-01 RX ADMIN — FLUOCINONIDE: 0.5 CREAM TOPICAL at 17:27

## 2017-01-01 RX ADMIN — HYDROMORPHONE HYDROCHLORIDE 0.5 MG: 1 INJECTION, SOLUTION INTRAMUSCULAR; INTRAVENOUS; SUBCUTANEOUS at 11:27

## 2017-01-01 RX ADMIN — BUDESONIDE AND FORMOTEROL FUMARATE DIHYDRATE 2 PUFF: 160; 4.5 AEROSOL RESPIRATORY (INHALATION) at 20:22

## 2017-01-01 RX ADMIN — LISINOPRIL 5 MG: 5 TABLET ORAL at 08:18

## 2017-01-01 RX ADMIN — LISINOPRIL 5 MG: 5 TABLET ORAL at 09:32

## 2017-01-01 RX ADMIN — GLYCOPYRROLATE 0.2 MG: 0.2 INJECTION, SOLUTION INTRAMUSCULAR; INTRAVENOUS at 11:28

## 2017-01-01 RX ADMIN — ASPIRIN 81 MG: 81 TABLET, CHEWABLE ORAL at 10:50

## 2017-01-01 RX ADMIN — INSULIN ASPART 5 UNITS: 100 INJECTION, SOLUTION INTRAVENOUS; SUBCUTANEOUS at 13:29

## 2017-01-01 RX ADMIN — INSULIN ASPART 3 UNITS: 100 INJECTION, SOLUTION INTRAVENOUS; SUBCUTANEOUS at 21:13

## 2017-01-01 RX ADMIN — CARVEDILOL 12.5 MG: 12.5 TABLET, FILM COATED ORAL at 09:32

## 2017-01-01 RX ADMIN — BUMETANIDE 1 MG: 1 TABLET ORAL at 08:18

## 2017-01-01 RX ADMIN — NYSTATIN: 100000 POWDER TOPICAL at 17:28

## 2017-01-01 RX ADMIN — NYSTATIN: 100000 POWDER TOPICAL at 18:31

## 2017-01-01 RX ADMIN — LISINOPRIL 5 MG: 5 TABLET ORAL at 13:56

## 2017-01-01 RX ADMIN — GLYCOPYRROLATE 0.4 MG: 0.2 INJECTION, SOLUTION INTRAMUSCULAR; INTRAVENOUS at 16:16

## 2017-01-01 RX ADMIN — MICONAZOLE NITRATE: 20 CREAM TOPICAL at 08:18

## 2017-01-01 RX ADMIN — FLUOCINONIDE: 0.5 CREAM TOPICAL at 08:40

## 2017-01-01 RX ADMIN — FLUOCINONIDE: 0.5 CREAM TOPICAL at 18:26

## 2017-01-01 RX ADMIN — FLUOCINONIDE: 0.5 CREAM TOPICAL at 20:54

## 2017-01-01 RX ADMIN — MICONAZOLE NITRATE: 20 CREAM TOPICAL at 17:27

## 2017-01-01 RX ADMIN — VANCOMYCIN HYDROCHLORIDE 1750 MG: 1 INJECTION, POWDER, LYOPHILIZED, FOR SOLUTION INTRAVENOUS at 16:01

## 2017-01-01 RX ADMIN — BUDESONIDE AND FORMOTEROL FUMARATE DIHYDRATE 2 PUFF: 160; 4.5 AEROSOL RESPIRATORY (INHALATION) at 23:21

## 2017-01-01 RX ADMIN — HYDROCODONE BITARTRATE AND ACETAMINOPHEN 1 TABLET: 5; 325 TABLET ORAL at 15:03

## 2017-01-01 RX ADMIN — GABAPENTIN 300 MG: 300 CAPSULE ORAL at 13:56

## 2017-01-01 RX ADMIN — ENOXAPARIN SODIUM 110 MG: 120 INJECTION SUBCUTANEOUS at 21:59

## 2017-01-01 RX ADMIN — METOCLOPRAMIDE 10 MG: 5 INJECTION, SOLUTION INTRAMUSCULAR; INTRAVENOUS at 22:32

## 2017-01-01 RX ADMIN — INSULIN ASPART 5 UNITS: 100 INJECTION, SOLUTION INTRAVENOUS; SUBCUTANEOUS at 17:45

## 2017-01-01 RX ADMIN — FLUOCINONIDE: 0.5 CREAM TOPICAL at 08:36

## 2017-01-01 RX ADMIN — CARVEDILOL 12.5 MG: 12.5 TABLET, FILM COATED ORAL at 08:38

## 2017-01-01 RX ADMIN — VANCOMYCIN HYDROCHLORIDE 500 MG: 500 INJECTION, POWDER, LYOPHILIZED, FOR SOLUTION INTRAVENOUS at 11:04

## 2017-01-01 RX ADMIN — DONEPEZIL HYDROCHLORIDE 5 MG: 5 TABLET, FILM COATED ORAL at 21:25

## 2017-01-01 RX ADMIN — NYSTATIN: 100000 POWDER TOPICAL at 10:04

## 2017-01-01 RX ADMIN — NYSTATIN: 100000 POWDER TOPICAL at 09:55

## 2017-01-01 RX ADMIN — POTASSIUM CHLORIDE 10 MEQ: 750 CAPSULE, EXTENDED RELEASE ORAL at 08:41

## 2017-01-01 RX ADMIN — BUMETANIDE 1 MG/HR: 0.25 INJECTION INTRAMUSCULAR; INTRAVENOUS at 04:19

## 2017-01-01 RX ADMIN — NYSTATIN: 100000 POWDER TOPICAL at 17:20

## 2017-01-01 RX ADMIN — BUMETANIDE 2 MG: 0.25 INJECTION INTRAMUSCULAR; INTRAVENOUS at 03:32

## 2017-01-01 RX ADMIN — MENTHOL, CAMPHOR: 1.11; 1.11 LOTION TOPICAL at 10:02

## 2017-01-01 RX ADMIN — BUDESONIDE AND FORMOTEROL FUMARATE DIHYDRATE 2 PUFF: 160; 4.5 AEROSOL RESPIRATORY (INHALATION) at 07:44

## 2017-01-01 RX ADMIN — BUMETANIDE 2 MG: 0.25 INJECTION, SOLUTION INTRAMUSCULAR; INTRAVENOUS at 12:39

## 2017-01-01 RX ADMIN — INSULIN ASPART 3 UNITS: 100 INJECTION, SOLUTION INTRAVENOUS; SUBCUTANEOUS at 17:45

## 2017-01-01 RX ADMIN — MENTHOL, CAMPHOR: 1.11; 1.11 LOTION TOPICAL at 17:20

## 2017-01-01 RX ADMIN — NYSTATIN: 100000 POWDER TOPICAL at 17:11

## 2017-01-01 RX ADMIN — INSULIN ASPART 2 UNITS: 100 INJECTION, SOLUTION INTRAVENOUS; SUBCUTANEOUS at 13:29

## 2017-01-01 RX ADMIN — MICONAZOLE NITRATE: 20 CREAM TOPICAL at 18:26

## 2017-01-01 RX ADMIN — INSULIN ASPART 3 UNITS: 100 INJECTION, SOLUTION INTRAVENOUS; SUBCUTANEOUS at 18:08

## 2017-01-01 RX ADMIN — MENTHOL, CAMPHOR: 1.11; 1.11 LOTION TOPICAL at 10:52

## 2017-01-01 RX ADMIN — BUDESONIDE AND FORMOTEROL FUMARATE DIHYDRATE 2 PUFF: 160; 4.5 AEROSOL RESPIRATORY (INHALATION) at 22:54

## 2017-01-01 RX ADMIN — BUMETANIDE 1 MG/HR: 0.25 INJECTION INTRAMUSCULAR; INTRAVENOUS at 13:30

## 2017-01-01 RX ADMIN — POTASSIUM CHLORIDE 10 MEQ: 750 CAPSULE, EXTENDED RELEASE ORAL at 08:17

## 2017-01-01 RX ADMIN — BUDESONIDE AND FORMOTEROL FUMARATE DIHYDRATE 2 PUFF: 160; 4.5 AEROSOL RESPIRATORY (INHALATION) at 20:47

## 2017-01-01 RX ADMIN — CARVEDILOL 12.5 MG: 12.5 TABLET, FILM COATED ORAL at 08:39

## 2017-01-01 RX ADMIN — BUMETANIDE 1 MG/HR: 0.25 INJECTION INTRAMUSCULAR; INTRAVENOUS at 09:44

## 2017-01-01 RX ADMIN — FLUOCINONIDE: 0.5 CREAM TOPICAL at 18:08

## 2017-01-01 RX ADMIN — ASPIRIN 81 MG: 81 TABLET ORAL at 13:57

## 2017-01-01 RX ADMIN — CARVEDILOL 12.5 MG: 12.5 TABLET, FILM COATED ORAL at 09:22

## 2017-01-01 RX ADMIN — INSULIN ASPART 5 UNITS: 100 INJECTION, SOLUTION INTRAVENOUS; SUBCUTANEOUS at 18:08

## 2017-01-01 RX ADMIN — INSULIN ASPART 5 UNITS: 100 INJECTION, SOLUTION INTRAVENOUS; SUBCUTANEOUS at 10:01

## 2017-01-01 RX ADMIN — FLUOCINONIDE: 0.5 CREAM TOPICAL at 17:47

## 2017-01-01 RX ADMIN — BUDESONIDE AND FORMOTEROL FUMARATE DIHYDRATE 2 PUFF: 160; 4.5 AEROSOL RESPIRATORY (INHALATION) at 06:39

## 2017-01-01 RX ADMIN — ASPIRIN 81 MG: 81 TABLET, CHEWABLE ORAL at 09:32

## 2017-01-01 RX ADMIN — GABAPENTIN 100 MG: 100 CAPSULE ORAL at 08:35

## 2017-01-01 RX ADMIN — CARVEDILOL 6.25 MG: 12.5 TABLET, FILM COATED ORAL at 10:50

## 2017-01-01 RX ADMIN — BUDESONIDE AND FORMOTEROL FUMARATE DIHYDRATE 2 PUFF: 160; 4.5 AEROSOL RESPIRATORY (INHALATION) at 20:33

## 2017-01-01 RX ADMIN — DONEPEZIL HYDROCHLORIDE 5 MG: 5 TABLET, FILM COATED ORAL at 20:37

## 2017-01-01 RX ADMIN — INSULIN ASPART 4 UNITS: 100 INJECTION, SOLUTION INTRAVENOUS; SUBCUTANEOUS at 11:51

## 2017-01-01 RX ADMIN — BUMETANIDE 1 MG/HR: 0.25 INJECTION INTRAMUSCULAR; INTRAVENOUS at 21:15

## 2017-01-01 RX ADMIN — BUDESONIDE AND FORMOTEROL FUMARATE DIHYDRATE 2 PUFF: 160; 4.5 AEROSOL RESPIRATORY (INHALATION) at 10:02

## 2017-01-01 RX ADMIN — MICONAZOLE NITRATE: 20 CREAM TOPICAL at 10:51

## 2017-01-01 RX ADMIN — INSULIN ASPART 5 UNITS: 100 INJECTION, SOLUTION INTRAVENOUS; SUBCUTANEOUS at 18:02

## 2017-01-01 RX ADMIN — BUMETANIDE 2 MG: 0.25 INJECTION, SOLUTION INTRAMUSCULAR; INTRAVENOUS at 22:07

## 2017-01-01 RX ADMIN — BUMETANIDE 1 MG/HR: 0.25 INJECTION INTRAMUSCULAR; INTRAVENOUS at 04:44

## 2017-01-01 RX ADMIN — CARVEDILOL 12.5 MG: 12.5 TABLET, FILM COATED ORAL at 13:56

## 2017-01-01 RX ADMIN — PANTOPRAZOLE SODIUM 8 MG/HR: 40 INJECTION, POWDER, FOR SOLUTION INTRAVENOUS at 06:16

## 2017-01-01 RX ADMIN — GLYCOPYRROLATE 0.2 MG: 0.2 INJECTION, SOLUTION INTRAMUSCULAR; INTRAVENOUS at 13:48

## 2017-01-01 RX ADMIN — CARVEDILOL 6.25 MG: 12.5 TABLET, FILM COATED ORAL at 18:07

## 2017-01-01 RX ADMIN — NYSTATIN: 100000 POWDER TOPICAL at 08:36

## 2017-01-01 RX ADMIN — NYSTATIN: 100000 POWDER TOPICAL at 17:23

## 2017-01-01 RX ADMIN — IPRATROPIUM BROMIDE AND ALBUTEROL SULFATE 3 ML: .5; 3 SOLUTION RESPIRATORY (INHALATION) at 04:57

## 2017-01-01 RX ADMIN — INSULIN ASPART 5 UNITS: 100 INJECTION, SOLUTION INTRAVENOUS; SUBCUTANEOUS at 12:50

## 2017-01-01 RX ADMIN — INSULIN ASPART 5 UNITS: 100 INJECTION, SOLUTION INTRAVENOUS; SUBCUTANEOUS at 12:27

## 2017-01-01 RX ADMIN — INSULIN ASPART 2 UNITS: 100 INJECTION, SOLUTION INTRAVENOUS; SUBCUTANEOUS at 12:13

## 2017-01-01 RX ADMIN — INSULIN ASPART 5 UNITS: 100 INJECTION, SOLUTION INTRAVENOUS; SUBCUTANEOUS at 08:18

## 2017-01-01 RX ADMIN — MICONAZOLE NITRATE: 20 CREAM TOPICAL at 18:30

## 2017-01-01 RX ADMIN — CARVEDILOL 12.5 MG: 12.5 TABLET, FILM COATED ORAL at 17:19

## 2017-01-01 RX ADMIN — FLUOCINONIDE: 0.5 CREAM TOPICAL at 10:45

## 2017-01-01 RX ADMIN — ASPIRIN 81 MG: 81 TABLET, CHEWABLE ORAL at 08:35

## 2017-01-01 RX ADMIN — GABAPENTIN 300 MG: 300 CAPSULE ORAL at 09:17

## 2017-01-01 RX ADMIN — PANTOPRAZOLE SODIUM 8 MG/HR: 40 INJECTION, POWDER, FOR SOLUTION INTRAVENOUS at 22:33

## 2017-01-01 RX ADMIN — INSULIN ASPART 5 UNITS: 100 INJECTION, SOLUTION INTRAVENOUS; SUBCUTANEOUS at 08:17

## 2017-01-01 RX ADMIN — MENTHOL, CAMPHOR: 1.11; 1.11 LOTION TOPICAL at 08:36

## 2017-01-01 RX ADMIN — GABAPENTIN 300 MG: 300 CAPSULE ORAL at 20:37

## 2017-01-01 RX ADMIN — GLYCOPYRROLATE 0.4 MG: 0.2 INJECTION, SOLUTION INTRAMUSCULAR; INTRAVENOUS at 20:47

## 2017-01-01 RX ADMIN — BUMETANIDE 2 MG: 0.25 INJECTION, SOLUTION INTRAMUSCULAR; INTRAVENOUS at 05:01

## 2017-01-01 RX ADMIN — BUDESONIDE AND FORMOTEROL FUMARATE DIHYDRATE 2 PUFF: 160; 4.5 AEROSOL RESPIRATORY (INHALATION) at 10:44

## 2017-01-01 RX ADMIN — BUMETANIDE 1 MG/HR: 0.25 INJECTION INTRAMUSCULAR; INTRAVENOUS at 16:20

## 2017-01-01 RX ADMIN — NYSTATIN: 100000 POWDER TOPICAL at 10:45

## 2017-01-01 RX ADMIN — INSULIN DETEMIR 28 UNITS: 100 INJECTION, SOLUTION SUBCUTANEOUS at 21:26

## 2017-01-01 RX ADMIN — CARVEDILOL 12.5 MG: 12.5 TABLET, FILM COATED ORAL at 23:41

## 2017-01-01 RX ADMIN — INSULIN ASPART 5 UNITS: 100 INJECTION, SOLUTION INTRAVENOUS; SUBCUTANEOUS at 09:42

## 2017-01-01 RX ADMIN — INSULIN ASPART 5 UNITS: 100 INJECTION, SOLUTION INTRAVENOUS; SUBCUTANEOUS at 12:25

## 2017-01-01 RX ADMIN — MENTHOL, CAMPHOR: 1.11; 1.11 LOTION TOPICAL at 18:08

## 2017-01-01 RX ADMIN — FLUOCINONIDE: 0.5 CREAM TOPICAL at 10:01

## 2017-01-01 RX ADMIN — POTASSIUM CHLORIDE 10 MEQ: 750 CAPSULE, EXTENDED RELEASE ORAL at 09:32

## 2017-01-01 RX ADMIN — VANCOMYCIN HYDROCHLORIDE 2000 MG: 1 INJECTION, POWDER, LYOPHILIZED, FOR SOLUTION INTRAVENOUS at 19:25

## 2017-01-01 RX ADMIN — NYSTATIN: 100000 POWDER TOPICAL at 17:48

## 2017-01-01 RX ADMIN — GABAPENTIN 100 MG: 100 CAPSULE ORAL at 10:52

## 2017-01-01 RX ADMIN — INSULIN ASPART 3 UNITS: 100 INJECTION, SOLUTION INTRAVENOUS; SUBCUTANEOUS at 13:13

## 2017-01-01 RX ADMIN — MICONAZOLE NITRATE: 20 CREAM TOPICAL at 09:32

## 2017-01-01 RX ADMIN — INSULIN ASPART 5 UNITS: 100 INJECTION, SOLUTION INTRAVENOUS; SUBCUTANEOUS at 08:35

## 2017-02-05 PROBLEM — J18.9 PNEUMONIA: Status: ACTIVE | Noted: 2017-01-01

## 2017-02-05 PROBLEM — E11.9 DIABETES MELLITUS (HCC): Status: ACTIVE | Noted: 2017-01-01

## 2017-02-05 PROBLEM — J44.9 COPD (CHRONIC OBSTRUCTIVE PULMONARY DISEASE) (HCC): Status: ACTIVE | Noted: 2017-01-01

## 2017-02-05 PROBLEM — I50.9 ACUTE CONGESTIVE HEART FAILURE (HCC): Status: ACTIVE | Noted: 2017-01-01

## 2017-02-05 PROBLEM — I73.9 PAD (PERIPHERAL ARTERY DISEASE) (HCC): Status: ACTIVE | Noted: 2017-01-01

## 2017-02-05 PROBLEM — F03.90 DEMENTIA (HCC): Status: ACTIVE | Noted: 2017-01-01

## 2017-02-05 PROBLEM — M79.606 LEG PAIN: Status: ACTIVE | Noted: 2017-01-01

## 2017-02-05 PROBLEM — L03.90 CELLULITIS: Status: ACTIVE | Noted: 2017-01-01

## 2017-02-05 PROBLEM — A49.02 MRSA INFECTION: Status: ACTIVE | Noted: 2017-01-01

## 2017-02-05 PROBLEM — N18.9 CKD (CHRONIC KIDNEY DISEASE): Status: ACTIVE | Noted: 2017-01-01

## 2017-02-05 NOTE — ED PROVIDER NOTES
EMERGENCY DEPARTMENT ENCOUNTER    CHIEF COMPLAINT  Chief Complaint: generalized swelling  History given by:  patient  History limited by: nothing  Room Number: 07/07  PMD: Jono Verde Jr., MD      HPI:  Pt is a 85 y.o. female who presents complaining of worsening generalized swelling that began 1 week ago. Pt also complains of fatigue and SOA, but denies CP. Pt denies history of blood clots and problems with the colon. Pt has history of quadruple bypass surgery.    Duration: 1 week  Onset: gradual  Timing: constant  Location: generalized  Radiation: none  Quality: generalized  Intensity/Severity: moderate  Progression: worsening  Associated Symptoms: fatigue, SOA  Aggravating Factors: none stated  Alleviating Factors: none stated  Previous Episodes: none stated  Treatment before arrival: none stated    PAST MEDICAL HISTORY  Active Ambulatory Problems     Diagnosis Date Noted   • No Active Ambulatory Problems     Resolved Ambulatory Problems     Diagnosis Date Noted   • No Resolved Ambulatory Problems     Past Medical History   Diagnosis Date   • Anemia    • Bronchitis    • CHF (congestive heart failure)    • CKD (chronic kidney disease)    • COPD (chronic obstructive pulmonary disease)    • Dementia    • Diabetes mellitus    • Dysphagia    • E. coli UTI (urinary tract infection)    • Humerus fracture    • Hyperlipidemia    • Hypertension    • Hyponatremia    • Memory changes    • MRSA infection    • Onychomycosis    • PAD (peripheral artery disease)    • Pneumonia    • Renal insufficiency    • Sepsis        PAST SURGICAL HISTORY  Past Surgical History   Procedure Laterality Date   • Coronary artery bypass graft         FAMILY HISTORY  History reviewed. No pertinent family history.    SOCIAL HISTORY  Social History     Social History   • Marital status:      Spouse name: N/A   • Number of children: N/A   • Years of education: N/A     Occupational History   • Not on file.     Social History Main Topics    • Smoking status: Former Smoker   • Smokeless tobacco: Not on file   • Alcohol use No   • Drug use: Not on file   • Sexual activity: Not on file     Other Topics Concern   • Not on file     Social History Narrative   • No narrative on file       ALLERGIES  Sulfa antibiotics    REVIEW OF SYSTEMS  Review of Systems   Constitutional: Positive for fatigue. Negative for chills, fever and unexpected weight change.   HENT: Negative for congestion, rhinorrhea and sore throat.    Respiratory: Positive for shortness of breath. Negative for cough.    Cardiovascular: Positive for leg swelling. Negative for chest pain.        Generalized swelling   Gastrointestinal: Negative for abdominal pain, blood in stool, diarrhea, nausea and vomiting.   Genitourinary: Negative for difficulty urinating, dysuria and frequency.   Musculoskeletal: Negative.    Skin: Negative.  Negative for rash.   Neurological: Negative.  Negative for weakness and headaches.   All other systems reviewed and are negative.      PHYSICAL EXAM  ED Triage Vitals   Temp Heart Rate Resp BP SpO2   02/05/17 1613 02/05/17 1613 02/05/17 1613 02/05/17 1613 02/05/17 1613   97.6 °F (36.4 °C) 63 20 128/47 100 %      Temp src Heart Rate Source Patient Position BP Location FiO2 (%)   02/05/17 1613 02/05/17 1613 -- -- --   Tympanic Monitor          Physical Exam   Constitutional: She is oriented to person, place, and time and well-developed, well-nourished, and in no distress. No distress.   HENT:   Head: Normocephalic and atraumatic.   Eyes: EOM are normal. Pupils are equal, round, and reactive to light.   Neck: Normal range of motion. Neck supple.   Cardiovascular: Normal rate and regular rhythm.    Murmur heard.   Systolic murmur is present with a grade of 2/6   Not able to palpate pulses.   Pulmonary/Chest: Effort normal. No respiratory distress. She has wheezes (bilateral).   Abdominal: Soft. She exhibits distension. There is no tenderness. There is no rebound and no  guarding.   Tympanic abd   Musculoskeletal: Normal range of motion. She exhibits edema (3+ in the BLE).   Neurological: She is alert and oriented to person, place, and time. She has normal sensation and normal strength.   Skin: Skin is warm and dry. No rash noted.   Vascular insufficiency blisters on BLE. BLE are warm to the touch.   Psychiatric: Mood and affect normal.   Nursing note and vitals reviewed.      LAB RESULTS  Lab Results (last 24 hours)     Procedure Component Value Units Date/Time    CBC & Differential [[id number]] Collected:  02/05/17 1710    Specimen:  Blood Updated:  02/05/17 1727    Narrative:       The following orders were created for panel order CBC & Differential.  Procedure                               Abnormality         Status                     ---------                               -----------         ------                     CBC Auto Differential[[id number]]         Abnormal            Final result                 Please view results for these tests on the individual orders.    Comprehensive Metabolic Panel [[id number]]  (Abnormal) Collected:  02/05/17 1710    Specimen:  Blood Updated:  02/05/17 1747     Glucose 115 (H) mg/dL      BUN 52 (H) mg/dL      Creatinine 1.99 (H) mg/dL      Sodium 140 mmol/L      Potassium 5.0 mmol/L      Chloride 102 mmol/L      CO2 22.6 mmol/L      Calcium 9.1 mg/dL      Total Protein 6.1 g/dL      Albumin 3.20 (L) g/dL      ALT (SGPT) 13 U/L      AST (SGOT) 18 U/L      Alkaline Phosphatase 94 U/L      Total Bilirubin 0.3 mg/dL      eGFR Non African Amer 24 (L) mL/min/1.73      Globulin 2.9 gm/dL      A/G Ratio 1.1 g/dL      BUN/Creatinine Ratio 26.1 (H)      Anion Gap 15.4 mmol/L     Narrative:       The MDRD GFR formula is only valid for adults with stable renal function between ages 18 and 70.    Troponin [[id number]]  (Normal) Collected:  02/05/17 1710    Specimen:  Blood Updated:  02/05/17 1747     Troponin T <0.010 ng/mL     Narrative:       Troponin T  Reference Ranges:  Less than 0.03 ng/mL:    Negative for AMI  0.03 to 0.09 ng/mL:      Indeterminant for AMI  Greater than 0.09 ng/mL: Positive for AMI    CK [[id number]]  (Normal) Collected:  02/05/17 1710    Specimen:  Blood Updated:  02/05/17 1747     Creatine Kinase 32 U/L     BNP [[id number]]  (Abnormal) Collected:  02/05/17 1710    Specimen:  Blood Updated:  02/05/17 1744     proBNP 6992.0 (H) pg/mL     Narrative:       Among patients with dyspnea, NT-proBNP is highly sensitive for the detection of acute congestive heart failure. In addition NT-proBNP of <300 pg/ml effectively rules out acute congestive heart failure with 99% negative predictive value.    CBC Auto Differential [[id number]]  (Abnormal) Collected:  02/05/17 1710    Specimen:  Blood Updated:  02/05/17 1727     WBC 6.41 10*3/mm3      RBC 3.70 (L) 10*6/mm3      Hemoglobin 10.6 (L) g/dL      Hematocrit 34.2 (L) %      MCV 92.4 fL      MCH 28.6 pg      MCHC 31.0 (L) g/dL      RDW 15.6 (H) %      RDW-SD 52.8 fl      MPV 10.9 fL      Platelets 183 10*3/mm3      Neutrophil % 67.6 %      Lymphocyte % 20.7 %      Monocyte % 7.5 %      Eosinophil % 3.7 %      Basophil % 0.5 %      Immature Grans % 0.0 %      Neutrophils, Absolute 4.33 10*3/mm3      Lymphocytes, Absolute 1.33 10*3/mm3      Monocytes, Absolute 0.48 10*3/mm3      Eosinophils, Absolute 0.24 10*3/mm3      Basophils, Absolute 0.03 10*3/mm3      Immature Grans, Absolute 0.00 10*3/mm3     Urinalysis With / Culture If Indicated [[id number]]  (Abnormal) Collected:  02/05/17 1711    Specimen:  Urine from Urine, Catheter Updated:  02/05/17 1728     Color, UA Yellow      Appearance, UA Cloudy (A)      pH, UA <=5.0      Specific Gravity, UA 1.015      Glucose, UA Negative      Ketones, UA Negative      Bilirubin, UA Negative      Blood, UA Negative      Protein, UA Negative      Leuk Esterase, UA Negative      Nitrite, UA Negative      Urobilinogen, UA 1.0 E.U./dL     Narrative:       Urine microscopic not  indicated.          I ordered the above labs and reviewed the results    RADIOLOGY  XR Chest 2 View    (Results Pending) -shows CHF and pneumonia        I ordered the above noted radiological studies. Interpreted by radiologist. Reviewed by me in PACS.       PROCEDURES  Procedures      PROGRESS AND CONSULTS  ED Course     1706- Ordered blood work, CMP, CK, Troponin, UA, BNP, EKG, and CXR for further evaluation.    1752-Ordered Zosyn and Vancomycin for pneumonia and Lasix.    1752- Call to [facility].    1754- Pt rechecked and is resting comfortably. Discussed with pt CXR results that show CHF and pneumonia. Discussed with pt plan to admit to the hospital and to start abx. Pt understands and agrees to the plan. All questions addressed.    1756- Discussed pt's case with Dr. Lagunas who agrees to admit the pt to a monitored bed.      MEDICAL DECISION MAKING  Results were reviewed/discussed with the patient and they were also made aware of online access. Pt also made aware that some labs, such as cultures, will not be resulted during ER visit and follow up with PMD is necessary.     MDM  Number of Diagnoses or Management Options     Amount and/or Complexity of Data Reviewed  Clinical lab tests: ordered and reviewed  Tests in the radiology section of CPT®: ordered and reviewed (CXR shows CHF and pneumonia)  Tests in the medicine section of CPT®: ordered and reviewed (See Dr. Dominguez's note for interpretation.)  Decide to obtain previous medical records or to obtain history from someone other than the patient: yes  Review and summarize past medical records: yes (Pt had a pulmonary MRSA diagnosis in 2014)  Independent visualization of images, tracings, or specimens: yes    Patient Progress  Patient progress: stable         DIAGNOSIS  Final diagnoses:   Acute congestive heart failure, unspecified congestive heart failure type   Pneumonia of both lungs due to infectious organism, unspecified part of lung   Cellulitis of lower  extremity, unspecified laterality       DISPOSITION  ADMISSION    Discussed treatment plan and reason for admission with pt/family and admitting physician.  Pt/family voiced understanding of the plan for admission for further testing/treatment as needed.       Latest Documented Vital Signs:  As of 5:57 PM  BP- 148/62 HR- 63 Temp- 97.6 °F (36.4 °C) (Tympanic) O2 sat- 100%    --  Documentation assistance provided by sarthak Gallardo for VALERIA Rodrigues.  Information recorded by the sarthak was done at my direction and has been verified and validated by me.       Erica Gallardo  02/05/17 1730       Erica Gallardo  02/05/17 1757       VALERIA Lincoln  02/05/17 1800

## 2017-02-05 NOTE — ED PROVIDER NOTES
Pt with a h/o CHF presents to ED complaining of edema, fatigue, and SOA for several days. Pt has been receiving a double dose of Bumex for 3 days. Upon exam, pt is alert, oriented x3, and in mild respiratory distress. Pt has pale conjunctiva and dry mucous membranes. Heart is at regular rate and rhythm but there are rales present in bilateral lung bases. Abd is distended. There is 2+ pedal edema in bilateral lower extremities, and there is cellulitis in RLE. CXR shows CHF and patchy infiltrate. She will receive Lasix and abx before being admitted.     EKG  EKG time: 1749   Rhythm/Rate: accelerated junctional rhythm   No Acute Ischemia  Non-Specific ST-T changes    Unchanged compared to prior on 2014  Interpreted Contemporaneously by me.  Independently viewed by me    I supervised care provided by the midlevel provider.    We have discussed this patient's history, physical exam, and treatment plan.   I have reviewed the note and personally saw and examined the patient and agree with the plan of care.    Documentation assistance provided by sarthak Piedra.  Information recorded by the scribe was done at my direction and has been verified and validated by me.       Melita Piedra  02/05/17 6997       Barry Dominguez MD  02/05/17 7954

## 2017-02-06 NOTE — PLAN OF CARE
Problem: Patient Care Overview (Adult)  Goal: Plan of Care Review  Outcome: Ongoing (interventions implemented as appropriate)    02/06/17 0812   Coping/Psychosocial Response Interventions   Plan Of Care Reviewed With patient   Patient Care Overview   Progress progress toward functional goals is gradual       Goal: Adult Individualization and Mutuality  Outcome: Ongoing (interventions implemented as appropriate)  Goal: Discharge Needs Assessment  Outcome: Ongoing (interventions implemented as appropriate)    Problem: Infection, Risk/Actual (Adult)  Goal: Identify Related Risk Factors and Signs and Symptoms  Outcome: Outcome(s) achieved Date Met:  02/06/17  Goal: Infection Prevention/Resolution  Outcome: Ongoing (interventions implemented as appropriate)

## 2017-02-06 NOTE — PROGRESS NOTES
Discharge Planning Assessment  [facility]     Patient Name: Halley Becerra  MRN: [id number]  Today's Date: 2/6/2017    Admit Date: 2/5/2017          Discharge Needs Assessment       02/06/17 1632    Living Environment    Lives With facility resident    Living Arrangements extended care facility    Provides Primary Care For no one    Primary Care Provided By other (see comments)    Quality Of Family Relationships unable to assess    Able to Return to Prior Living Arrangements yes    Discharge Needs Assessment    Concerns To Be Addressed no discharge needs identified;denies needs/concerns at this time    Anticipated Changes Related to Illness none    Equipment Currently Used at Home cane, straight    Equipment Needed After Discharge none    Discharge Disposition still a patient            Discharge Plan       02/06/17 1633    Case Management/Social Work Plan    Plan Home    Additional Comments IMM  02/05.   Spoke with patient at bedside.  I introduced myself and explained CCP role.  Verified face sheet Pt is a resident at [facility] in [place].  Spoke to Mya and pt can return to facility.  She has not used any bed hold days.  She uses a cane for ambulation.  She does use home oxygen at night.  Her plan is to return to [facility] at [place].  CCP will follow for patient needs.        Discharge Placement     No information found                Demographic Summary     None            Functional Status       02/06/17 1631    Functional Status Current    Ambulation 3-->assistive equipment and person    Bathing 2-->assistive person    Dressing 2-->assistive person    Eating 2-->assistive person    Communication 0-->understands/communicates without difficulty    Swallowing (if score 2 or more for any item, consult Rehab Services) 0-->swallows foods/liquids without difficulty    Change in Functional Status Since Onset of Current Illness/Injury yes    Functional Status Prior    Ambulation 3-->assistive equipment  and person    Transferring 3-->assistive equipment and person    Toileting 3-->assistive equipment and person    Bathing 2-->assistive person    Dressing 2-->assistive person    Eating 0-->independent    Communication 0-->understands/communicates without difficulty            Psychosocial     None            Abuse/Neglect     None            Legal     None            Substance Abuse     None            Patient Forms     None          Charlette Cage, TASHI

## 2017-02-06 NOTE — PROGRESS NOTES
Bilateral lower extremity venous doppler completed, Prelim Rt negative, Lt positive for old superficial thrombus given to TASHI Gonzalez

## 2017-02-06 NOTE — PROGRESS NOTES
"Pharmacokinetic Consult - Vancomycin Dosing (Initial Note)    Halley Becerra has been consulted for pharmacy to dose vancomycin for Skin and soft tissue infection  Pharmacy dosing vancomycin per Dr Ellen Chakraborty's request.   Goal trough: 15-20 mg/L   Other antimicrobials: was on zosyn, now d/c    Relevant clinical data and objective history reviewed:  85 y.o. female 62\" (157.5 cm) 236 lb 8 oz (107 kg)    Past Medical History   Diagnosis Date   • Anemia    • Bronchitis    • CHF (congestive heart failure)    • CKD (chronic kidney disease)    • COPD (chronic obstructive pulmonary disease)    • Dementia    • Diabetes mellitus    • Dysphagia    • E. coli UTI (urinary tract infection)    • Humerus fracture    • Hyperlipidemia    • Hypertension    • Hyponatremia    • Memory changes    • MRSA infection    • Onychomycosis    • PAD (peripheral artery disease)    • Pneumonia    • Renal insufficiency    • Sepsis      CREATININE   Date Value Ref Range Status   02/06/2017 1.94 (H) 0.57 - 1.00 mg/dL Final   02/05/2017 1.99 (H) 0.57 - 1.00 mg/dL Final     BUN   Date Value Ref Range Status   02/06/2017 51 (H) 8 - 23 mg/dL Final     Estimated Creatinine Clearance: 24.4 mL/min (by C-G formula based on Cr of 1.94).    Lab Results   Component Value Date    WBC 5.58 02/06/2017     Temp Readings from Last 3 Encounters:      Baseline culture/source/susceptibility: 2/5 MRSA screen : pending  2/5 Resp Panel:  pending  2/5 Resp CX: pending  2/5 BC x2  NG x 24 hours   Dosing History: ( will use intermittent dosing do to poor renal function)  2/5  1927  Vanc 2 grams  2/6  1116 Vanc Random = 17.3mcg/ml    Plan:   Will give one time dose of Vancomycin 1750mg ( 16.4mg/kg) today.....will obtain random level in am  BMP in am     Vicky Orr [facility]    "

## 2017-02-06 NOTE — CONSULTS
Patient Name: Halley Becerra  Age/Sex: 85 y.o. female  : 1931  MRN: [id number]    Date of Admission: 2017  Date of Encounter Visit: 17  Encounter Provider: Carloz Vanegas MD  Place of Service: [facility] CARDIOLOGY      Referring Provider: Fermín Lagunas MD  Patient Care Team:  Jono Verde Jr., MD as PCP - General (Geriatric Medicine)    Subjective:     Consulted for:  CHF    Chief Complaint: Generalized Swelling     History of Present Illness:  Halley Becerra is a 85 y.o. female with a hx of DM, HTN, hyperlipidemia, CAD s/p CABG, PAD- ( intervention for critical limb ischemia was done by Dr. Sherman on 12 at [place]) , chronic anemia, and atrial fibrillation.  She also has a hx of abdominal hematoma from lovenox use- so at that time it was decided she was not a good anticoagulation candidate. She was last seen by Dr. Stallworth in . At that time has stated that she had been hospitalized for acute CHF- but had recovered and was doing well at the office visit on 13- she was to follow up in 6 months.     She presented herself to the ER yesterday evening for c/o worsening generalized swelling that began 1 week ago.  She reports that the swelling is moderate to severe in intensity.  Does not change with positional alteration or elevation.  Tender to palpation.  Mild erythema.  Associated symptoms- SOA.  Shortness of air is mild in intensity.  Worse with exertion.  Resolves with rest and sitting upright.  No fever or chills.  Bun/crt of 52/1.99. Negative troponin x 1. proBNP of 6992.  Chest x-ray with bilateral infiltrates consistent with either multifocal pneumonia or edema.  She was given a one time dose of 40 mg IV lasix in the ER. She is currently on 2 mg IV bumex q8.     Echo pending.       Previous testing:   Echocardiogram on 09-        Past Medical History:  Past Medical History   Diagnosis Date   • Anemia    • Bronchitis    • CHF  (congestive heart failure)    • CKD (chronic kidney disease)    • COPD (chronic obstructive pulmonary disease)    • Dementia    • Diabetes mellitus    • Dysphagia    • E. coli UTI (urinary tract infection)    • Humerus fracture    • Hyperlipidemia    • Hypertension    • Hyponatremia    • Memory changes    • MRSA infection    • Onychomycosis    • PAD (peripheral artery disease)    • Pneumonia    • Renal insufficiency    • Sepsis        Past Surgical History   Procedure Laterality Date   • Coronary artery bypass graft         Home Medications:   Prescriptions Prior to Admission   Medication Sig Dispense Refill Last Dose   • acetaminophen (TYLENOL) 325 MG tablet Take 650 mg by mouth Every 6 (Six) Hours As Needed for mild pain (1-3).   Past Week at Unknown time   • aspirin 81 MG chewable tablet Chew 81 mg Daily.   2/5/2017 at Unknown time   • bumetanide (BUMEX) 2 MG tablet Take 2 mg by mouth 2 (Two) Times a Day.   2/5/2017 at Unknown time   • carvedilol (COREG) 12.5 MG tablet Take 12.5 mg by mouth 2 (Two) Times a Day With Meals.   2/5/2017 at Unknown time   • donepezil (ARICEPT) 5 MG tablet Take 5 mg by mouth Every Night.   2/4/2017 at Unknown time   • fluocinonide (LIDEX) 0.05 % cream Apply  topically 2 (Two) Times a Day. To affected areas i.e. psoriasis   2/5/2017 at Unknown time   • fluticasone-salmeterol (ADVAIR DISKUS) 250-50 MCG/DOSE DISKUS Inhale 1 puff 2 (Two) Times a Day.   2/5/2017 at Unknown time   • insulin aspart (novoLOG) 100 UNIT/ML injection Inject 5 Units under the skin 3 (Three) Times a Day Before Meals. And   Sliding scale   2/5/2017 at Unknown time   • insulin aspart (novoLOG) 100 UNIT/ML injection Inject 5 Units under the skin 3 (Three) Times a Day Before Meals.   2/5/2017 at Unknown time   • insulin detemir (LEVEMIR) 100 UNIT/ML injection Inject 30 Units under the skin Daily. With breakfast   2/5/2017 at Unknown time   • lisinopril (PRINIVIL,ZESTRIL) 5 MG tablet Take 5 mg by mouth Daily.   2/5/2017  at Unknown time   • miconazole (MICOTIN) 2 % cream Apply 1 application topically 2 (Two) Times a Day. To buttocks   2/5/2017 at Unknown time   • miconazole (MICOTIN) 2 % cream Apply 1 application topically As Needed (apply to buttocks after each incontinence episode).   2/5/2017 at Unknown time   • Multiple Vitamins-Minerals (MULTIVITAMIN ADULT PO) Take  by mouth Daily.   2/5/2017 at Unknown time   • nystatin (MYCOSTATIN) 451396 UNIT/GM powder Apply 1 application topically 2 (Two) Times a Day. To skin folds   2/5/2017 at Unknown time   • potassium chloride (K-DUR,KLOR-CON) 10 MEQ CR tablet Take 10 mEq by mouth Daily.   2/5/2017 at Unknown time       Allergies:  Allergies   Allergen Reactions   • Sulfa Antibiotics        Past Social History:  Social History     Social History   • Marital status:      Spouse name: N/A   • Number of children: N/A   • Years of education: N/A     Occupational History   • Not on file.     Social History Main Topics   • Smoking status: Former Smoker   • Smokeless tobacco: Not on file   • Alcohol use No   • Drug use: Not on file   • Sexual activity: Not on file     Other Topics Concern   • Not on file     Social History Narrative   • No narrative on file        Past Family History:  History reviewed. No pertinent family history.      Review of Systems:  All systems reviewed. Pertinent positives identified in HPI. All other systems are negative.         Objective:     Objective:  Temp:  [97.6 °F (36.4 °C)-98.1 °F (36.7 °C)] 97.7 °F (36.5 °C)  Heart Rate:  [57-64] 57  Resp:  [16-20] 16  BP: ()/(41-62) 92/52    Intake/Output Summary (Last 24 hours) at 02/06/17 0832  Last data filed at 02/06/17 0700   Gross per 24 hour   Intake      0 ml   Output    550 ml   Net   -550 ml     Body mass index is 43.26 kg/(m^2).  Last 3 weights    02/05/17  1613 02/05/17  1957 02/06/17  0700   Weight: 229 lb (104 kg) 237 lb 1.6 oz (108 kg) (on bed) 236 lb 8 oz (107 kg) (on bed)           Physical  Exam:   General Appearance Well developed, cooperative and well nourished and no acute distress   Head Normocephalic, without abnormality, atraumatic   Ears Ears appear intact with no abnormalities noted   Throat No oral lesions, no thrush, oral mucosa moist   Neck No adenopathy, supple, trachea midline, no thyromegaly, no carotid bruit, no JVD   Back No skin lesions, erythema or scars, no tenderness to percussion or palptaion,range of motion is normal   Lungs  bilateral rales least to mid lung    Heart Regular rhythm and normal rate, normal S1 and S2, no murmur, no gallop, no rub, no click   Chest wall No abnormalities observed   Abdomen Normal bowel sounds, no masses, no hepatomegaly,    Extremities  2+ bilateral lower extremity edema.  Tender to palpation.  Erythematous   Pulses Pulses palpable and equal bilaterally. Normal radial, carotid, femoral, dorsalis pedis and posterior tibial pulses bilaterally. Normal abdominal aorta   Skin  erythema bilateral lower extremities as documented above    Psyhiatric Alert and oriented x 3, normal mood and affect       Lab Review:       Results from last 7 days  Lab Units 02/06/17  0440 02/05/17  1710   SODIUM mmol/L 140 140   POTASSIUM mmol/L 4.8 5.0   CHLORIDE mmol/L 103 102   TOTAL CO2 mmol/L 22.1 22.6   BUN mg/dL 51* 52*   CREATININE mg/dL 1.94* 1.99*   GLUCOSE mg/dL 69 115*   CALCIUM mg/dL 9.1 9.1   AST (SGOT) U/L  --  18   ALT (SGPT) U/L  --  13       Results from last 7 days  Lab Units 02/05/17  1710   CK TOTAL U/L 32   TROPONIN T ng/mL <0.010       Results from last 7 days  Lab Units 02/06/17  0440   WBC 10*3/mm3 5.58   HEMOGLOBIN g/dL 9.5*   HEMATOCRIT % 30.4*   PLATELETS 10*3/mm3 172                       Results from last 7 days  Lab Units 02/05/17  1710   PROBNP pg/mL 6992.0*           Results from last 7 days  Lab Units 02/06/17  0440   TSH mIU/mL 3.100       Imaging:    Imaging Results (most recent)     Procedure Component Value Units Date/Time    XR Chest 2  View [[id number]] Collected:  17 1753     Updated:  17 1843    Narrative:       XR CHEST 2 VW-     HISTORY: 85-year-old female with shortness of breath and lower extremity  swelling.     FINDINGS: There is pulmonary vascular congestion and likely interstitial  edema. There are airspace opacities scattered throughout both lung  fields which may represent pneumonia as well.     This report was finalized on 2017 6:40 PM by Dr. Uzma Benson MD.             EK17-      Telemetry on 17-        I personally viewed and interpreted the patient's EKG/Telemetry data.    Assessment:   1.  Acute on chronic diastolic congestive heart failure  2.  CAD status post CABG  3.  Lower extremity edema and pain  4.  Chronic kidney disease  5.  Peripheral arterial disease  6.  DM  7. Stasis dermatitis    -Patient is clearly volume overloaded.  I do not think she has pneumonia.  Her pro-calcitonin is low.  -Continue IV Bumex at the current dose.  Strict I's and O's.  -If the patient's bilateral lower extremity pain continues after diuresis and euvolemia, I would recommend JOHNSON with her history of peripheral arterial disease. DP pulses palpable at this time. Not acute leg.   -Transthoracic echocardiogram is pending  -Continue ACE inhibitor and carvedilol at current dose.      Thank you for allowing me to participate in the care of Halley Becerra. Feel free to contact me directly with any further questions or concerns.    Carloz Vanegas MD  [facility]  17  8:32 AM

## 2017-02-06 NOTE — H&P
HISTORY AND PHYSICAL   [facility]        Patient Identification:  Name: Halley Becerra  Age: 85 y.o.  Sex: female  :  1931  MRN: [id number]                     Primary Care Physician: Jono Verde Jr., MD    Chief Complaint:  Leg pain and swelling    History of Present Illness:       The patient is an 85-year-old white female with history of COPD, CHF, CK D, dementia, diabetes, hypertension, hyperlipidemia, peripheral arterial disease and history of MRSA pneumonia who is admitted with several day history of leg swelling and pain with increasing shortness of air and coughing.  They did increase her diuretics for a few days and the swelling did not get any better.  She was evaluated in the emergency room and felt to have pneumonia and CHF was given some diuretics and started on some antibiotics.  She is not had any chest pain.  She's had some redness in her lower extremities and probably some cellulitis.  She denies any nausea or vomiting.  She not had any fevers or chills.    Past Medical History:  Past Medical History   Diagnosis Date   • Anemia    • Bronchitis    • CHF (congestive heart failure)    • CKD (chronic kidney disease)    • COPD (chronic obstructive pulmonary disease)    • Dementia    • Diabetes mellitus    • Dysphagia    • E. coli UTI (urinary tract infection)    • Humerus fracture    • Hyperlipidemia    • Hypertension    • Hyponatremia    • Memory changes    • MRSA infection    • Onychomycosis    • PAD (peripheral artery disease)    • Pneumonia    • Renal insufficiency    • Sepsis      Past Surgical History:  Past Surgical History   Procedure Laterality Date   • Coronary artery bypass graft        Home Meds:  Prescriptions Prior to Admission   Medication Sig Dispense Refill Last Dose   • acetaminophen (TYLENOL) 325 MG tablet Take 650 mg by mouth Every 6 (Six) Hours As Needed for mild pain (1-3).   Past Week at Unknown time   • aspirin 81 MG chewable tablet Chew 81 mg Daily.    2/5/2017 at Unknown time   • bumetanide (BUMEX) 2 MG tablet Take 2 mg by mouth 2 (Two) Times a Day.   2/5/2017 at Unknown time   • carvedilol (COREG) 12.5 MG tablet Take 12.5 mg by mouth 2 (Two) Times a Day With Meals.   2/5/2017 at Unknown time   • donepezil (ARICEPT) 5 MG tablet Take 5 mg by mouth Every Night.   2/4/2017 at Unknown time   • fluocinonide (LIDEX) 0.05 % cream Apply  topically 2 (Two) Times a Day. To affected areas i.e. psoriasis   2/5/2017 at Unknown time   • fluticasone-salmeterol (ADVAIR DISKUS) 250-50 MCG/DOSE DISKUS Inhale 1 puff 2 (Two) Times a Day.   2/5/2017 at Unknown time   • insulin aspart (novoLOG) 100 UNIT/ML injection Inject 5 Units under the skin 3 (Three) Times a Day Before Meals. And   Sliding scale   2/5/2017 at Unknown time   • insulin aspart (novoLOG) 100 UNIT/ML injection Inject 5 Units under the skin 3 (Three) Times a Day Before Meals.   2/5/2017 at Unknown time   • insulin detemir (LEVEMIR) 100 UNIT/ML injection Inject 30 Units under the skin Daily. With breakfast   2/5/2017 at Unknown time   • lisinopril (PRINIVIL,ZESTRIL) 5 MG tablet Take 5 mg by mouth Daily.   2/5/2017 at Unknown time   • miconazole (MICOTIN) 2 % cream Apply 1 application topically 2 (Two) Times a Day. To buttocks   2/5/2017 at Unknown time   • miconazole (MICOTIN) 2 % cream Apply 1 application topically As Needed (apply to buttocks after each incontinence episode).   2/5/2017 at Unknown time   • Multiple Vitamins-Minerals (MULTIVITAMIN ADULT PO) Take  by mouth Daily.   2/5/2017 at Unknown time   • nystatin (MYCOSTATIN) 124224 UNIT/GM powder Apply 1 application topically 2 (Two) Times a Day. To skin folds   2/5/2017 at Unknown time   • potassium chloride (K-DUR,KLOR-CON) 10 MEQ CR tablet Take 10 mEq by mouth Daily.   2/5/2017 at Unknown time       Current Facility-Administered Medications:   •  aspirin chewable tablet 81 mg, 81 mg, Oral, Daily, Fermín Lagunas MD  •  budesonide-formoterol (SYMBICORT) 160-4.5  MCG/ACT inhaler 2 puff, 2 puff, Inhalation, BID - RT, Fermín Lagunas MD, 2 puff at 02/05/17 2037  •  [START ON 2/6/2017] bumetanide (BUMEX) injection 2 mg, 2 mg, Intravenous, Q8H, Fermín Lagunas MD  •  carvedilol (COREG) tablet 12.5 mg, 12.5 mg, Oral, BID With Meals, Fermín Lagunas MD  •  dextrose (D50W) solution 25 g, 25 g, Intravenous, Q15 Min PRN, Fermín Lagunas MD  •  dextrose (GLUTOSE) oral gel 15 g, 15 g, Oral, Q15 Min PRN, Fermín Lagunas MD  •  donepezil (ARICEPT) tablet 5 mg, 5 mg, Oral, Nightly, Fermín Lagunas MD  •  enoxaparin (LOVENOX) syringe 30 mg, 30 mg, Subcutaneous, Q24H, Fermín Lagunas MD  •  glucagon (human recombinant) (GLUCAGEN DIAGNOSTIC) injection 1 mg, 1 mg, Subcutaneous, Q15 Min PRN, Fermín Lagunas MD  •  insulin aspart (novoLOG) injection 0-7 Units, 0-7 Units, Subcutaneous, 4x Daily AC & at Bedtime, Fermín Lagunas MD  •  [START ON 2/6/2017] insulin aspart (novoLOG) injection 5 Units, 5 Units, Subcutaneous, TID AC, Fermín Lagunas MD  •  insulin detemir (LEVEMIR) injection 30 Units, 30 Units, Subcutaneous, Nightly, Fermín Lagunas MD  •  lisinopril (PRINIVIL,ZESTRIL) tablet 5 mg, 5 mg, Oral, Daily, Fermín Lagunas MD  •  Pharmacy to dose vancomycin, , Does not apply, Continuous PRN, Fermín Lagunas MD  •  [START ON 2/6/2017] piperacillin-tazobactam (ZOSYN) 3.375 g in dextrose 50 mL IVPB (premix), 3.375 g, Intravenous, Q12H, Fermín Lagunas MD  •  potassium chloride (MICRO-K) CR capsule 10 mEq, 10 mEq, Oral, Daily, Fermín Lagunas MD  •  Insert peripheral IV, , , Once **AND** sodium chloride 0.9 % flush 10 mL, 10 mL, Intravenous, PRN, Chris Fernandes, APRN  Allergies:  Allergies   Allergen Reactions   • Sulfa Antibiotics      Immunizations:    There is no immunization history on file for this patient.  Social History:   Social History     Social History Narrative   • No narrative on file     Social History     Social History   • Marital status:      Spouse name: N/A   • Number of children: N/A   • Years  "of education: N/A     Occupational History   • Not on file.     Social History Main Topics   • Smoking status: Former Smoker   • Smokeless tobacco: Not on file   • Alcohol use No   • Drug use: Not on file   • Sexual activity: Not on file     Other Topics Concern   • Not on file     Social History Narrative   • No narrative on file       Family History:  History reviewed. No pertinent family history.     Review of Systems  See history of present illness and past medical history.  Patient denies headache, dizziness, syncope, falls, trauma, change in vision, change in hearing, change in taste, changes in weight, changes in appetite, focal weakness, numbness, or paresthesia.  Patient denies chest pain, palpitations, orthopnea, PND,  sinus pressure, rhinorrhea, epistaxis, hemoptysis, nausea, vomiting,hematemesis, diarrhea, constipation or hematchezia.  Denies cold or heat intolerance, polydipsia, polyuria, polyphagia. Denies hematuria, pyuria, dysuria, hesitancy, frequency or urgency.   Denies fever, chills, sweats, night sweats.  Denies missing any routine medications. Remainder of ROS is negative.    Objective:  tMax 24 hrs: Temp (24hrs), Av.9 °F (36.6 °C), Min:97.6 °F (36.4 °C), Max:98 °F (36.7 °C)    Vitals Ranges:   Temp:  [97.6 °F (36.4 °C)-98 °F (36.7 °C)] 98 °F (36.7 °C)  Heart Rate:  [60-64] 60  Resp:  [16-20] 16  BP: (119-148)/(46-62) 119/46      Exam:  Visit Vitals   • /46   • Pulse 60   • Temp 98 °F (36.7 °C)   • Resp 16   • Ht 62\" (157.5 cm)   • Wt 229 lb (104 kg)   • SpO2 98%   • BMI 41.88 kg/m2       General Appearance:    Alert, cooperative, no distress, appears stated age   Head:    Normocephalic, without obvious abnormality, atraumatic   Eyes:    PERRL, conjunctiva/corneas clear, EOM's intact, both eyes   Ears:    Normal external ear canals, both ears   Nose:   Nares normal, septum midline, mucosa normal, no drainage    or sinus tenderness   Throat:   Lips, mucosa, and tongue normal   Neck:   " Supple, symmetrical, trachea midline, no adenopathy;     thyroid:  no enlargement/tenderness/nodules; no carotid    bruit or JVD   Back:     Symmetric, no curvature, ROM normal, no CVA tenderness   Lungs:     crackles bilaterally, respirations unlabored   Chest Wall:    No tenderness or deformity    Heart:    Regular rate and rhythm, S1 and S2 normal, no murmur, rub   or gallop   Abdomen:     Soft, non-tender, bowel sounds active all four quadrants,     no masses, no hepatomegaly, no splenomegaly   Extremities:   Extremities normal, atraumatic, no cyanosis , 2+ leg edema with some associated cellulitis in both lower extremities    Pulses:   2+ and symmetric all extremities   Skin:   Skin color, texture, turgor normal, no rashes or lesions   Lymph nodes:   Cervical, supraclavicular, and axillary nodes normal   Neurologic:   CNII-XII intact, normal strength, sensation intact throughout      .    Data Review:  Lab Results (last 72 hours)     Procedure Component Value Units Date/Time    CBC & Differential [[id number]] Collected:  02/05/17 1710    Specimen:  Blood Updated:  02/05/17 1727    Narrative:       The following orders were created for panel order CBC & Differential.  Procedure                               Abnormality         Status                     ---------                               -----------         ------                     CBC Auto Differential[[id number]]         Abnormal            Final result                 Please view results for these tests on the individual orders.    CBC Auto Differential [[id number]]  (Abnormal) Collected:  02/05/17 1710    Specimen:  Blood Updated:  02/05/17 1727     WBC 6.41 10*3/mm3      RBC 3.70 (L) 10*6/mm3      Hemoglobin 10.6 (L) g/dL      Hematocrit 34.2 (L) %      MCV 92.4 fL      MCH 28.6 pg      MCHC 31.0 (L) g/dL      RDW 15.6 (H) %      RDW-SD 52.8 fl      MPV 10.9 fL      Platelets 183 10*3/mm3      Neutrophil % 67.6 %      Lymphocyte % 20.7 %      Monocyte %  7.5 %      Eosinophil % 3.7 %      Basophil % 0.5 %      Immature Grans % 0.0 %      Neutrophils, Absolute 4.33 10*3/mm3      Lymphocytes, Absolute 1.33 10*3/mm3      Monocytes, Absolute 0.48 10*3/mm3      Eosinophils, Absolute 0.24 10*3/mm3      Basophils, Absolute 0.03 10*3/mm3      Immature Grans, Absolute 0.00 10*3/mm3     Urinalysis With / Culture If Indicated [[id number]]  (Abnormal) Collected:  02/05/17 1711    Specimen:  Urine from Urine, Catheter Updated:  02/05/17 1728     Color, UA Yellow      Appearance, UA Cloudy (A)      pH, UA <=5.0      Specific Gravity, UA 1.015      Glucose, UA Negative      Ketones, UA Negative      Bilirubin, UA Negative      Blood, UA Negative      Protein, UA Negative      Leuk Esterase, UA Negative      Nitrite, UA Negative      Urobilinogen, UA 1.0 E.U./dL     Narrative:       Urine microscopic not indicated.    BNP [[id number]]  (Abnormal) Collected:  02/05/17 1710    Specimen:  Blood Updated:  02/05/17 1744     proBNP 6992.0 (H) pg/mL     Narrative:       Among patients with dyspnea, NT-proBNP is highly sensitive for the detection of acute congestive heart failure. In addition NT-proBNP of <300 pg/ml effectively rules out acute congestive heart failure with 99% negative predictive value.    Troponin [[id number]]  (Normal) Collected:  02/05/17 1710    Specimen:  Blood Updated:  02/05/17 1747     Troponin T <0.010 ng/mL     Narrative:       Troponin T Reference Ranges:  Less than 0.03 ng/mL:    Negative for AMI  0.03 to 0.09 ng/mL:      Indeterminant for AMI  Greater than 0.09 ng/mL: Positive for AMI    CK [[id number]]  (Normal) Collected:  02/05/17 1710    Specimen:  Blood Updated:  02/05/17 1747     Creatine Kinase 32 U/L     Comprehensive Metabolic Panel [[id number]]  (Abnormal) Collected:  02/05/17 1710    Specimen:  Blood Updated:  02/05/17 1747     Glucose 115 (H) mg/dL      BUN 52 (H) mg/dL      Creatinine 1.99 (H) mg/dL      Sodium 140 mmol/L      Potassium 5.0 mmol/L       Chloride 102 mmol/L      CO2 22.6 mmol/L      Calcium 9.1 mg/dL      Total Protein 6.1 g/dL      Albumin 3.20 (L) g/dL      ALT (SGPT) 13 U/L      AST (SGOT) 18 U/L      Alkaline Phosphatase 94 U/L      Total Bilirubin 0.3 mg/dL      eGFR Non African Amer 24 (L) mL/min/1.73      Globulin 2.9 gm/dL      A/G Ratio 1.1 g/dL      BUN/Creatinine Ratio 26.1 (H)      Anion Gap 15.4 mmol/L     Narrative:       The MDRD GFR formula is only valid for adults with stable renal function between ages 18 and 70.    Blood Culture [[id number]] Collected:  02/05/17 1815    Specimen:  Blood from Arm, Left Updated:  02/05/17 1823    Blood Culture [[id number]] Collected:  02/05/17 1815    Specimen:  Blood from Arm, Right Updated:  02/05/17 1824                Results from last 7 days  Lab Units 02/05/17  1710   HEMOGLOBIN A1C % 6.04*      Imaging Results (all)     Procedure Component Value Units Date/Time    XR Chest 2 View [[id number]] Collected:  02/05/17 1753     Updated:  02/05/17 1843    Narrative:       XR CHEST 2 VW-     HISTORY: 85-year-old female with shortness of breath and lower extremity  swelling.     FINDINGS: There is pulmonary vascular congestion and likely interstitial  edema. There are airspace opacities scattered throughout both lung  fields which may represent pneumonia as well.     This report was finalized on 2/5/2017 6:40 PM by Dr. Uzma Benson MD.           Patient Active Problem List   Diagnosis Code   • Acute congestive heart failure I50.9   • MRSA infection A49.02   • COPD (chronic obstructive pulmonary disease) J44.9   • PAD (peripheral artery disease) I73.9   • CKD (chronic kidney disease) N18.9   • Pneumonia J18.9   • Diabetes mellitus E11.9   • Dementia F03.90   • Cellulitis L03.90   • Leg pain M79.606       Assessment:  Principal Problem:    Acute congestive heart failure  Active Problems:    MRSA infection    COPD (chronic obstructive pulmonary disease)    PAD (peripheral artery disease)    CKD (chronic kidney  disease)    Pneumonia    Diabetes mellitus    Dementia    Cellulitis    Leg pain      Plan:  The patient's admitted to hospital and started on some IV diuretics, IV antibiotics and will consult cardiology.  We'll check venous duplex of lower extremity rule out DVT.  We'll also check echocardiogram.    Fermín Lagunas MD  2/5/2017  9:30 PM

## 2017-02-06 NOTE — PROGRESS NOTES
Acute Care - Physical Therapy Initial Evaluation  [facility]     Patient Name: Halley Becerra  : 1931  MRN: [id number]  Today's Date: 2017   Onset of Illness/Injury or Date of Surgery Date: 17  Date of Referral to PT: 17  Referring Physician: Ellen Chairez      Admit Date: 2017     Visit Dx:    ICD-10-CM ICD-9-CM   1. Acute congestive heart failure, unspecified congestive heart failure type I50.9 428.0   2. Pneumonia of both lungs due to infectious organism, unspecified part of lung J18.9 483.8   3. Cellulitis of lower extremity, unspecified laterality L03.119 682.6   4. Muscle weakness (generalized) M62.81 728.87     Patient Active Problem List   Diagnosis   • Acute congestive heart failure   • MRSA infection   • COPD (chronic obstructive pulmonary disease)   • PAD (peripheral artery disease)   • CKD (chronic kidney disease)   • Pneumonia   • Diabetes mellitus   • Dementia   • Cellulitis   • Leg pain     Past Medical History   Diagnosis Date   • Anemia    • Bronchitis    • CHF (congestive heart failure)    • CKD (chronic kidney disease)    • COPD (chronic obstructive pulmonary disease)    • Dementia    • Diabetes mellitus    • Dysphagia    • E. coli UTI (urinary tract infection)    • Humerus fracture    • Hyperlipidemia    • Hypertension    • Hyponatremia    • Memory changes    • MRSA infection    • Onychomycosis    • PAD (peripheral artery disease)    • Pneumonia    • Renal insufficiency    • Sepsis      Past Surgical History   Procedure Laterality Date   • Coronary artery bypass graft            PT ASSESSMENT (last 72 hours)      PT Evaluation       17 1153 17 2301    Rehab Evaluation    Document Type evaluation  -MS     Subjective Information agree to therapy;complains of;weakness;fatigue;pain  -MS     Patient Effort, Rehab Treatment fair  -MS     Symptoms Noted Comment Pt. reports pain in her Abdomen and Bilateral L.E.'s  -MS     General Information    Onset of  Illness/Injury or Date of Surgery Date 02/05/17  -MS     Referring Physician Ellen Chairez  -MS     General Observations Bilateral L.E. pitting edema, redness  -MS     Pertinent History Of Current Problem Pt. admited with Bilateral L.E. Cellulitis  -MS     Precautions/Limitations fall precautions   Exit alarm; Pt. on 2.5 liters oxygen  -MS     Prior Level of Function independent:  -MS     Equipment Currently Used at Home cane, straight   Pt. reports weeks since she has amb.  -MS cane, straight  -KG    Plans/Goals Discussed With patient;agreed upon  -MS     Risks Reviewed patient:  -MS     Benefits Reviewed patient:  -MS     Barriers to Rehab none identified  -MS     Living Environment    Lives With facility resident  -MS     Clinical Impression    Date of Referral to PT 02/06/17  -MS     Criteria for Skilled Therapeutic Interventions Met treatment indicated  -MS     Rehab Potential good, to achieve stated therapy goals  -MS     Pain Assessment    Pain Assessment 0-10  -MS     Pain Score 8  -MS     Post Pain Score 8  -MS     Pain Location Abdomen   Bilateral L.E.s  -MS     Pain Orientation Right;Left  -MS     Pain Intervention(s) Repositioned;Elevated;Rest  -MS     Cognitive Assessment/Intervention    Current Cognitive/Communication Assessment functional  -MS     Orientation Status oriented x 4  -MS     Follows Commands/Answers Questions 100% of the time  -MS     Personal Safety WNL/WFL  -MS     Personal Safety Interventions fall prevention program maintained;gait belt;nonskid shoes/slippers when out of bed;supervised activity  -MS     ROM (Range of Motion)    General ROM --   BUE (Imp. 25%); BLE (Imp. 50%)  -MS     MMT (Manual Muscle Testing)    General MMT Assessment --   BUE/LE (3-/5)  -MS     Bed Mobility, Assessment/Treatment    Bed Mobility, Scoot/Bridge, [place] dependent (less than 25% patient effort);2 person assist required  -MS     Bed Mob, Supine to Sit, [place] moderate assist (50%  patient effort);2 person assist required  -MS     Bed Mob, Sit to Supine, [place] moderate assist (50% patient effort);2 person assist required  -MS     Bed Mobility, Comment Precautions taken to help minimize/eliminate all friction/shearing forces to pt.'s skin during mobility.  -MS     Transfer Assessment/Treatment    Transfers, Sit-Stand [place] maximum assist (25% patient effort);2 person assist required;hand held assist  -MS     Transfers, Stand-Sit [place] maximum assist (25% patient effort);2 person assist required;hand held assist  -MS     Transfer, Comment Performed x 2 at bedside for functional strength training.  Bilateral feet/knees blocked for safety.  Pt. unable to maintain a standing position for more than 10 seconds at a time due to fatigue and overall pain.  -MS     Gait Assessment/Treatment    Gait, [place] Level unable to perform  -MS     Balance Skills Training    Sitting-Level of Assistance Minimum assistance   Posterior lean  -MS     Sitting-Balance Support Feet supported;Right upper extremity supported;Left upper extremity supported  -MS     Therapy Exercises    Bilateral Lower Extremities AROM:;5 reps;ankle pumps/circles   Unable to perform other ther. ex. due to increased pain  -MS     Positioning and Restraints    Pre-Treatment Position in bed  -MS     Post Treatment Position other  -MS     Other Position --   Assisted in sliding pt. onto transport bed for testing  -MS       User Key  (r) = Recorded By, (t) = Taken By, (c) = Cosigned By    Initials Name Provider Type    CLEMENTE Mahmood, TASHI Registered Nurse    MS Ari Chairez, PT Physical Therapist          Physical Therapy Education     Title: PT OT SLP Therapies (Done)     Topic: Physical Therapy (Done)     Point: Mobility training (Done)    Learning Progress Summary    Learner Readiness Method Response Comment Documented by Status   Patient Acceptance D,E NR,VEE  MS 02/06/17 8687 Done               Point: Home  exercise program (Done)    Learning Progress Summary    Learner Readiness Method Response Comment Documented by Status   Patient Acceptance D,E NR,VU  MS 02/06/17 1158 Done               Point: Body mechanics (Done)    Learning Progress Summary    Learner Readiness Method Response Comment Documented by Status   Patient Acceptance D,E NR,VU  MS 02/06/17 1158 Done               Point: Precautions (Done)    Learning Progress Summary    Learner Readiness Method Response Comment Documented by Status   Patient Acceptance D,E NR,VU  MS 02/06/17 1158 Done                      User Key     Initials Effective Dates Name Provider Type Discipline    MS 12/01/15 -  Ari Chairez, PT Physical Therapist PT                PT Recommendation and Plan  Anticipated Discharge Disposition: skilled nursing facility  Planned Therapy Interventions: balance training, bed mobility training, gait training, patient/family education, postural re-education, ROM (Range of Motion), strengthening, transfer training  PT Frequency: daily  Plan of Care Review  Plan Of Care Reviewed With: patient  Outcome Summary/Follow up Plan: Pt. will benefit from skilled inpt. P.T. to address her functional deficits and to assist pt. in regaining her independence with functional mobility.          IP PT Goals       02/06/17 1158          Bed Mobility PT LTG    Bed Mobility PT LTG, Date Established 02/06/17  -MS      Bed Mobility PT LTG, Time to Achieve 5 - 7 days  -MS      Bed Mobility PT LTG, Activity Type all bed mobility  -MS      Bed Mobility PT LTG, [place] Level minimum assist (75% patient effort);2 person assist required  -MS      Transfer Training PT LTG    Transfer Training PT LTG, Date Established 02/06/17  -MS      Transfer Training PT LTG, Time to Achieve 5 - 7 days  -MS      Transfer Training PT LTG, Activity Type sit to stand/stand to sit  -MS      Transfer Training PT LTG, [place] Level minimum assist (75% patient effort);2 person assist  required  -MS      Transfer Training PT LTG, Assist Device walker, rolling  -MS      Transfer Training 2 PT LTG    Transfer Training PT 2 LTG, Date Established 02/06/17  -MS      Transfer Training PT 2 LTG, Time to Achieve 5 - 7 days  -MS      Transfer Training PT 2 LTG, Activity Type bed to chair /chair to bed  -MS      Transfer Training PT 2 LTG, [place] Level minimum assist (75% patient effort);2 person assist required  -MS      Transfer Training PT 2 LTG, Assist Device walker, rolling  -MS      Gait Training PT LTG    Gait Training Goal PT LTG, Date Established 02/06/17  -MS      Gait Training Goal PT LTG, Time to Achieve 5 - 7 days  -MS      Gait Training Goal PT LTG, [place] Level minimum assist (75% patient effort);2 person assist required  -MS      Gait Training Goal PT LTG, Assist Device walker, rolling  -MS      Gait Training Goal PT LTG, Distance to Achieve 30 feet  -MS        User Key  (r) = Recorded By, (t) = Taken By, (c) = Cosigned By    Initials Name Provider Type    MS Ari Chairez, PT Physical Therapist                Outcome Measures       02/06/17 1200          How much help from another person do you currently need...    Turning from your back to your side while in flat bed without using bedrails? 2  -MS      Moving from lying on back to sitting on the side of a flat bed without bedrails? 2  -MS      Moving to and from a bed to a chair (including a wheelchair)? 1  -MS      Standing up from a chair using your arms (e.g., wheelchair, bedside chair)? 2  -MS      Climbing 3-5 steps with a railing? 1  -MS      To walk in hospital room? 1  -MS      AM-PAC 6 Clicks Score 9  -MS      Functional Assessment    Outcome Measure Options AM-PAC 6 Clicks Basic Mobility (PT)  -MS        User Key  (r) = Recorded By, (t) = Taken By, (c) = Cosigned By    Initials Name Provider Type    MS Ari Chairez, PT Physical Therapist           Time Calculation:         PT Charges       02/06/17 1200           Time Calculation    Start Time 1135  -MS      Stop Time 1150  -MS      Time Calculation (min) 15 min  -MS      PT Received On 02/06/17  -MS      PT - Next Appointment 02/07/17  -MS      PT Goal Re-Cert Due Date 02/13/17  -MS        User Key  (r) = Recorded By, (t) = Taken By, (c) = Cosigned By    Initials Name Provider Type    MS Ari Chairez, PT Physical Therapist          Therapy Charges for Today     Code Description Service Date Service Provider Modifiers Qty    09051713070 HC PT EVAL MOD COMPLEXITY 2 2/6/2017 Ari Chairez, PT GP 1    69479376100 HC PT THER PROC EA 15 MIN 2/6/2017 Ari Chairez, PT GP 1    23797253758 HC PT THER SUPP EA 15 MIN 2/6/2017 Ari Chairez, PT GP 1          PT G-Codes  Outcome Measure Options: AM-PAC 6 Clicks Basic Mobility (PT)      Ari Chairez, PT  2/6/2017

## 2017-02-06 NOTE — PROGRESS NOTES
" LOS: 1 day   Primary Care Physician: Jono Verde Jr., MD     Subjective  Legs \"hurt like hell\".  Minimal cough.  Sleeps on 2 pillows which is unchanged.  Has been urinating a lot.  Can't tell that legs are much different than when she first came in.  No chest pain.  Family at bedside.    Vital Signs  Body mass index is 43.26 kg/(m^2).  Temp:  [97.6 °F (36.4 °C)-98.1 °F (36.7 °C)] 97.7 °F (36.5 °C)  Heart Rate:  [57-64] 62  Resp:  [16-20] 16  BP: ()/(41-62) 107/45      Objective:  General Appearance:  In no acute distress (Elderly.  Morbidly obese.).    Vital signs: (most recent): Blood pressure 107/45, pulse 62, temperature 97.7 °F (36.5 °C), resp. rate 16, height 62\" (157.5 cm), weight 236 lb 8 oz (107 kg), SpO2 96 %.    HEENT: (No JVD.  No lymphadenopathy.  Trachea midline.)    Lungs:  There are decreased breath sounds.  No wheezes, rales or rhonchi.    Heart: Normal rate.  Regular rhythm.  No murmur.   Abdomen: Abdomen is soft and distended.  Bowel sounds are normal.   There is generalized tenderness.   There is hepatomegaly.   Extremities: There is dependent edema.  (2-3+ edema of the legs with erythema at the shins, right more than left.)  Pulses: Distal pulses are intact.    Neurological: Patient is alert.    Skin:  Warm.          Results Review:    I reviewed the patient's new clinical results.      Results from last 7 days  Lab Units 02/06/17  0440 02/05/17  1710   WBC 10*3/mm3 5.58 6.41   HEMOGLOBIN g/dL 9.5* 10.6*   PLATELETS 10*3/mm3 172 183       Results from last 7 days  Lab Units 02/06/17  0440 02/05/17  1710   SODIUM mmol/L 140 140   POTASSIUM mmol/L 4.8 5.0   CHLORIDE mmol/L 103 102   TOTAL CO2 mmol/L 22.1 22.6   BUN mg/dL 51* 52*   CREATININE mg/dL 1.94* 1.99*   CALCIUM mg/dL 9.1 9.1   GLUCOSE mg/dL 69 115*         Hemoglobin A1C:  Lab Results   Component Value Date    HGBA1C 6.04 (H) 02/05/2017       Glucose Range:  GLUCOSE   Date/Time Value Ref Range Status   02/06/2017 0646 70 70 - " 130 mg/dL Final   02/05/2017 2229 95 70 - 130 mg/dL Final   02/05/2017 2108 74 70 - 130 mg/dL Final       Medication Review: Yes    Physical Therapy:    Assessment/Plan     Active Hospital Problems (** Indicates Principal Problem)    Diagnosis Date Noted   • **Acute congestive heart failure [I50.9] 02/05/2017   • MRSA infection [A49.02] 02/05/2017   • COPD (chronic obstructive pulmonary disease) [J44.9] 02/05/2017   • PAD (peripheral artery disease) [I73.9] 02/05/2017   • CKD (chronic kidney disease) [N18.9] 02/05/2017   • Pneumonia [J18.9] 02/05/2017   • Diabetes mellitus [E11.9] 02/05/2017   • Dementia [F03.90] 02/05/2017   • Cellulitis [L03.90] 02/05/2017   • Leg pain [M79.606] 02/05/2017      Resolved Hospital Problems    Diagnosis Date Noted Date Resolved   No resolved problems to display.       Assessment & Plan  1.  Probable acute and chronic diastolic congestive heart failure.  Doubt she has pneumonia.  Continue IV Bumex.  Await results of echocardiogram.  Pro-calcitonin is low so I will not renew Zosyn.  On aspirin, beta blocker, ACE inhibitor.  She may have some passive liver congestion or possibly obstruction preventing venous return of the lower extremities.  CT abdomen pelvis ordered.  2.  Acute kidney injury with chronic kidney disease stage III.  Last creatinine here was 1.5 at discharge 2014.  Recheck in a.m.  Check post void residual.  Urinalysis is bland  3.  Dementia  4.  Diabetes mellitus type 2.  A1c is low.  Decrease scheduled Levemir.  Continue sliding scale.  5.  Stasis dermatitis of the lower extremities versus cellulitis.  Continue vancomycin for now.  Diuretics as ordered.  Follow clinically.      Ellen Chakraborty MD  02/06/17  10:45 AM

## 2017-02-06 NOTE — PLAN OF CARE
Problem: Patient Care Overview (Adult)  Goal: Plan of Care Review    02/06/17 1158   Coping/Psychosocial Response Interventions   Plan Of Care Reviewed With patient   Outcome Evaluation   Outcome Summary/Follow up Plan Pt. will benefit from skilled inpt. P.T. to address her functional deficits and to assist pt. in regaining her independence with functional mobility.         Problem: Inpatient Physical Therapy  Goal: Bed Mobility Goal LTG- PT    02/06/17 1158   Bed Mobility PT LTG   Bed Mobility PT LTG, Date Established 02/06/17   Bed Mobility PT LTG, Time to Achieve 5 - 7 days   Bed Mobility PT LTG, Activity Type all bed mobility   Bed Mobility PT LTG, [place] Level minimum assist (75% patient effort);2 person assist required       Goal: Transfer Training Goal 1 LTG- PT    02/06/17 1158   Transfer Training PT LTG   Transfer Training PT LTG, Date Established 02/06/17   Transfer Training PT LTG, Time to Achieve 5 - 7 days   Transfer Training PT LTG, Activity Type sit to stand/stand to sit   Transfer Training PT LTG, [place] Level minimum assist (75% patient effort);2 person assist required   Transfer Training PT LTG, Assist Device walker, rolling       Goal: Transfer Training Goal 2 LTG- PT    02/06/17 1158   Transfer Training 2 PT LTG   Transfer Training PT 2 LTG, Date Established 02/06/17   Transfer Training PT 2 LTG, Time to Achieve 5 - 7 days   Transfer Training PT 2 LTG, Activity Type bed to chair /chair to bed   Transfer Training PT 2 LTG, [place] Level minimum assist (75% patient effort);2 person assist required   Transfer Training PT 2 LTG, Assist Device walker, rolling       Goal: Gait Training Goal LTG- PT    02/06/17 1158   Gait Training PT LTG   Gait Training Goal PT LTG, Date Established 02/06/17   Gait Training Goal PT LTG, Time to Achieve 5 - 7 days   Gait Training Goal PT LTG, [place] Level minimum assist (75% patient effort);2 person assist required   Gait Training Goal PT LTG,  Assist Device walker, rolling   Gait Training Goal PT LTG, Distance to Achieve 30 feet

## 2017-02-07 NOTE — PROGRESS NOTES
"    Patient Name: Halley Becerra  :1931  85 y.o.      Patient Care Team:  Jono Verde Jr., MD as PCP - General (Geriatric Medicine)    Chief Complaint: acute diastolic heart failure    Interval History: She diuresed 2,700mL on IV bumex, Cr improved.  Despite this, weight is not better and she still feels very overloaded.  CT scan shows diffuse interstitial edema, but only a small amount of ascites.       Objective   Vital Signs  Temp:  [97.5 °F (36.4 °C)-98.1 °F (36.7 °C)] 97.6 °F (36.4 °C)  Heart Rate:  [58-66] 66  Resp:  [16-20] 20  BP: (103-139)/(42-51) 139/51    Intake/Output Summary (Last 24 hours) at 17 1310  Last data filed at 17 1003   Gross per 24 hour   Intake    320 ml   Output   3150 ml   Net  -2830 ml     Flowsheet Rows         First Filed Value    Admission Height  62\" (157.5 cm) Documented at 2017 1613    Admission Weight  229 lb (104 kg) Documented at 2017 1613          Physical Exam:   General Appearance:    Alert, cooperative, in no acute distress   Lungs:     Clear to auscultation.  Normal respiratory effort and rate.      Heart:    Regular rhythm and normal rate, normal S1 and S2, no murmurs, gallops or rubs.     Chest Wall:    No abnormalities observed   Abdomen:     Soft, nontender, positive bowel sounds, distended.   Extremities:   no cyanosis, clubbing.  No marked joint deformities.  Adequate musculoskeletal strength.       Results Review:      Results from last 7 days  Lab Units 17  0555   SODIUM mmol/L 140   POTASSIUM mmol/L 5.1   CHLORIDE mmol/L 103   TOTAL CO2 mmol/L 21.5*   BUN mg/dL 53*   CREATININE mg/dL 1.77*   GLUCOSE mg/dL 125*   CALCIUM mg/dL 9.4       Results from last 7 days  Lab Units 17  1710   CK TOTAL U/L 32   TROPONIN T ng/mL <0.010       Results from last 7 days  Lab Units 17  0555   WBC 10*3/mm3 6.13   HEMOGLOBIN g/dL 10.0*   HEMATOCRIT % 32.3*   PLATELETS 10*3/mm3 182          Echo   · Left ventricular function is " normal. Calculated EF = 62.7%. Estimated EF was in agreement with the calculated EF. Normal left ventricular cavity size and wall thickness noted. All left ventricular wall segments contract normally  · Left ventricular diastolic dysfunction is noted (grade II w/high LAP) consistent with pseudonormalization.  · Normal wall thickness and systolic function noted. Right ventricular cavity is mildly dilated.  · Left atrial volume is mildly increased.  · Right atrial cavity size is moderately dilated.  · Mild aortic valve calcification..  · Mild mitral annular calcification is present.  · Mild mitral valve regurgitation is present.  · Moderate tricuspid valve regurgitation is present. Estimated right ventricular systolic pressure from tricuspid regurgitation is markedly elevated (>55 mmHg).                        Medication Review:     aspirin 81 mg Oral Daily   budesonide-formoterol 2 puff Inhalation BID - RT   carvedilol 12.5 mg Oral BID With Meals   donepezil 5 mg Oral Nightly   enoxaparin 30 mg Subcutaneous Q24H   fluocinonide  Topical BID   insulin aspart 0-7 Units Subcutaneous 4x Daily AC & at Bedtime   insulin aspart 5 Units Subcutaneous TID AC   insulin detemir 26 Units Subcutaneous Nightly   lisinopril 5 mg Oral Daily   miconazole  Topical BID   nystatin  Topical BID   potassium chloride 10 mEq Oral Daily   Vancomycin Pharmacy Intermittent Dosing  Does not apply Daily          bumetanide (BUMEX) infusion 1 mg/hr   Pharmacy to dose vancomycin        Assessment/Plan     Acute diastolic dysfunction, pulmonary hypertension, volume overload.  Diuresing on high dose bumex, but weight is not coming down.  Will change to IV bumex infusion.    Brenton Stallworth MD  [facility]  02/07/17  1:10 PM

## 2017-02-07 NOTE — PLAN OF CARE
Problem: Patient Care Overview (Adult)  Goal: Plan of Care Review  Outcome: Ongoing (interventions implemented as appropriate)    02/06/17 0812 02/06/17 1158 02/07/17 0117   Coping/Psychosocial Response Interventions   Plan Of Care Reviewed With --  --  patient   Patient Care Overview   Progress progress toward functional goals is gradual --  --    Outcome Evaluation   Outcome Summary/Follow up Plan --  Pt. will benefit from skilled inpt. P.T. to address her functional deficits and to assist pt. in regaining her independence with functional mobility. --        Goal: Adult Individualization and Mutuality  Outcome: Ongoing (interventions implemented as appropriate)  Goal: Discharge Needs Assessment  Outcome: Ongoing (interventions implemented as appropriate)    Problem: Infection, Risk/Actual (Adult)  Goal: Infection Prevention/Resolution  Outcome: Ongoing (interventions implemented as appropriate)    Problem: Cardiac: Heart Failure (Adult)  Goal: Signs and Symptoms of Listed Potential Problems Will be Absent or Manageable (Cardiac: Heart Failure)  Outcome: Ongoing (interventions implemented as appropriate)

## 2017-02-07 NOTE — PROGRESS NOTES
" LOS: 2 days   Primary Care Physician: Jono Verde Jr., MD     Subjective  Legs are sore and belly feels full.  She has not wanted to get up and declined PT earlier.  3 family members at bedside.    Vital Signs  Body mass index is 43.57 kg/(m^2).  Temp:  [97.5 °F (36.4 °C)-98.1 °F (36.7 °C)] 97.7 °F (36.5 °C)  Heart Rate:  [58-66] 66  Resp:  [16-20] 18  BP: (103-139)/(42-57) 138/57      Objective:  General Appearance:  In no acute distress (Looks younger than age.  Morbidly obese.).    Vital signs: (most recent): Blood pressure 138/57, pulse 66, temperature 97.7 °F (36.5 °C), temperature source Oral, resp. rate 18, height 62\" (157.5 cm), weight 238 lb 3.2 oz (108 kg), SpO2 94 %.    HEENT: (No JVD.  Trachea midline.  No lymphadenopathy.  Oropharynx clear.)    Lungs:  There are decreased breath sounds.  No wheezes, rales or rhonchi.    Heart: Normal rate.  Regular rhythm.  No murmur.   Abdomen: Abdomen is soft and distended.  (Obese)Bowel sounds are normal.   There is generalized tenderness.  (Mild).     Extremities: There is dependent edema.  (2+ edema which is better than yesterday.  Erythema at the shins is also improved today)  Neurological: Patient is alert.  (Oriented to self).    Skin:  Warm and dry.          Results Review:    I reviewed the patient's new clinical results.      Results from last 7 days  Lab Units 02/07/17  0555 02/06/17  0440   WBC 10*3/mm3 6.13 5.58   HEMOGLOBIN g/dL 10.0* 9.5*   PLATELETS 10*3/mm3 182 172       Results from last 7 days  Lab Units 02/07/17  0555 02/06/17  0440   SODIUM mmol/L 140 140   POTASSIUM mmol/L 5.1 4.8   CHLORIDE mmol/L 103 103   TOTAL CO2 mmol/L 21.5* 22.1   BUN mg/dL 53* 51*   CREATININE mg/dL 1.77* 1.94*   CALCIUM mg/dL 9.4 9.1   GLUCOSE mg/dL 125* 69         Hemoglobin A1C:  Lab Results   Component Value Date    HGBA1C 6.04 (H) 02/05/2017       Glucose Range:  GLUCOSE   Date/Time Value Ref Range Status   02/07/2017 1151 195 (H) 70 - 130 mg/dL Final "   02/07/2017 0611 127 70 - 130 mg/dL Final   02/06/2017 2045 115 70 - 130 mg/dL Final   02/06/2017 1653 84 70 - 130 mg/dL Final   02/06/2017 1135 125 70 - 130 mg/dL Final   02/06/2017 0646 70 70 - 130 mg/dL Final       Medication Review: Yes    Physical Therapy: The patient declined    Assessment/Plan     Active Hospital Problems (** Indicates Principal Problem)    Diagnosis Date Noted   • **Acute congestive heart failure [I50.9] 02/05/2017   • MRSA infection [A49.02] 02/05/2017   • COPD (chronic obstructive pulmonary disease) [J44.9] 02/05/2017   • PAD (peripheral artery disease) [I73.9] 02/05/2017   • CKD (chronic kidney disease) [N18.9] 02/05/2017   • Pneumonia [J18.9] 02/05/2017   • Diabetes mellitus [E11.9] 02/05/2017   • Dementia [F03.90] 02/05/2017   • Cellulitis [L03.90] 02/05/2017   • Leg pain [M79.606] 02/05/2017      Resolved Hospital Problems    Diagnosis Date Noted Date Resolved   No resolved problems to display.       Assessment & Plan  1.  Acute and chronic diastolic congestive heart failure.  Clinically edema is improved.  Her weight was checked with the bed scale and may not be accurate.  We'll try to use the standing scale at bedside.  Bumex changed to drip per cardiology.  Recheck labs in a.m.  Lengthy discussion with patient and family members regarding treatment and prognosis.  Mobilize as tolerated.  2.  Probable stasis derm of the shins, better.  May have a component of cellulitis.  Will DC vancomycin after tomorrow's dose.  3.  Acute kidney injury/chronic kidney disease stage III.  Improving with diuresis.  Recheck a.m.  4.  Diabetes mellitus type 2 Levemir decreased yesterday because of hypoglycemia.  Continue sliding scale today.  Adjust Levemir tomorrow.  5.  Dementia.  Continue medications.    Discussed at length with patient and family members.  Multiple questions answered.  Disposition:    Ellen Chakraborty MD  02/07/17  3:54 PM

## 2017-02-07 NOTE — PROGRESS NOTES
"Pharmacokinetic Consult - Vancomycin Dosing (Follow-up Note)    Halley Becerra is on day #2   pharmacy to dose vancomycin for Skin and Soft tissue infection...lower extremity edema and pain (legs)  Pharmacy dosing vancomycin per Dr Ellen Chakraborty's request.   Goal trough: 15-20 mg/L     Relevant clinical data and objective history reviewed:  85 y.o. female 62\" (157.5 cm) 238 lb 3.2 oz (108 kg)    Past Medical History   Diagnosis Date   • Anemia    • Bronchitis    • CHF (congestive heart failure)    • CKD (chronic kidney disease)    • COPD (chronic obstructive pulmonary disease)    • Dementia    • Diabetes mellitus    • Dysphagia    • E. coli UTI (urinary tract infection)    • Humerus fracture    • Hyperlipidemia    • Hypertension    • Hyponatremia    • Memory changes    • MRSA infection    • Onychomycosis    • PAD (peripheral artery disease)    • Pneumonia    • Renal insufficiency    • Sepsis      CREATININE   Date Value Ref Range Status   02/07/2017 1.77 (H) 0.57 - 1.00 mg/dL Final   02/06/2017 1.94 (H) 0.57 - 1.00 mg/dL Final   02/05/2017 1.99 (H) 0.57 - 1.00 mg/dL Final     BUN   Date Value Ref Range Status   02/07/2017 53 (H) 8 - 23 mg/dL Final     Estimated Creatinine Clearance: 26.9 mL/min (by C-G formula based on Cr of 1.77).    Lab Results   Component Value Date    WBC 6.13 02/07/2017     Temp Readings from Last 3 Encounters:   02/07/17 97.6 °F (36.4 °C) (Oral)      Baseline culture/source/susceptibility: 2/5 MRSA screen : pending  2/5 Resp Panel: pending  2/5 Resp CX: pending  2/5 BC x2 NG x 24 hours    IV Anti-Infectives     Ordered     Dose/Rate Route Frequency Start Stop    02/06/17 1258  vancomycin 1750 mg/500 mL 0.9% NS IVPB ([facility])     Ordering Provider:  Ellen Chakraborty MD    1,750 mg  over 175 Minutes Intravenous Once 02/06/17 1400 02/06/17 1856    02/06/17 1059  Vancomycin Pharmacy Intermittent Dosing     Ordering Provider:  Ellen Chakraborty MD     Does not apply Daily 02/06/17 1130      02/06/17 1045  " Pharmacy to dose vancomycin     Ordering Provider:  Ellen Chakraborty MD     Does not apply Continuous PRN 02/06/17 1042      02/05/17 1752  vancomycin 2000 mg/500 mL 0.9% NS IVPB ([facility])     Ordering Provider:  VALERIA Lincoln    20 mg/kg × 104 kg Intravenous Once 02/05/17 1754 02/05/17 1925    02/05/17 1752  piperacillin-tazobactam (ZOSYN) 4.5 g in dextrose 100 mL IVPB (premix)     Ordering Provider:  VALERIA Lincoln    4.5 g  over 0.5 Hours Intravenous Once 02/05/17 1754 02/05/17 1924         No results found for: MICHELLE  Lab Results   Component Value Date    VANCORANDOM 27.20 02/07/2017     Dosing History: ( will use intermittent dosing due to poor renal function   Today scr= 1.77    Crcl= 26.9ml/min )  2/5 1927 Vanc 2 grams  2/6 1116 Vanc Random = 17.3mcg/ml     2/6 1601  Vanc 1750mg x 1 dose  2/7 0554  Vanc Random = 27.2mcg/ml    Assessment/Plan    No Vancomycin doses for today.  Random level = 27.2 mcg/ml .  Will obtain another random in the am and redose when < 20 mcg/ml.  Pharmacy will continue to follow daily while on vancomycin and adjust as needed.     Creatinine in am    Vicky Orr [facility]

## 2017-02-07 NOTE — PLAN OF CARE
Problem: Patient Care Overview (Adult)  Goal: Plan of Care Review  Outcome: Ongoing (interventions implemented as appropriate)    02/07/17 1810   Coping/Psychosocial Response Interventions   Plan Of Care Reviewed With patient   Patient Care Overview   Progress progress toward functional goals as expected   Outcome Evaluation   Outcome Summary/Follow up Plan started on bumex drip. pt is only to be weighed on standing scale or lift scale.          Problem: Infection, Risk/Actual (Adult)  Goal: Infection Prevention/Resolution  Outcome: Ongoing (interventions implemented as appropriate)

## 2017-02-08 PROBLEM — N18.30 CKD (CHRONIC KIDNEY DISEASE) STAGE 3, GFR 30-59 ML/MIN (HCC): Status: ACTIVE | Noted: 2017-01-01

## 2017-02-08 PROBLEM — E11.22 DIABETES MELLITUS WITH CHRONIC KIDNEY DISEASE (HCC): Status: ACTIVE | Noted: 2017-01-01

## 2017-02-08 PROBLEM — I50.33 ACUTE ON CHRONIC DIASTOLIC (CONGESTIVE) HEART FAILURE (HCC): Status: ACTIVE | Noted: 2017-01-01

## 2017-02-08 PROBLEM — A49.02 MRSA INFECTION: Status: RESOLVED | Noted: 2017-01-01 | Resolved: 2017-01-01

## 2017-02-08 NOTE — PROGRESS NOTES
" LOS: 3 days   Primary Care Physician: Jono Verde Jr., MD     Subjective  Feels the same.  Legs are still sore but does say that the swelling is better.  Belly still feels full    Vital Signs  Body mass index is 41.67 kg/(m^2).  Temp:  [96.5 °F (35.8 °C)-98.2 °F (36.8 °C)] 96.5 °F (35.8 °C)  Heart Rate:  [67-93] 68  Resp:  [18-20] 18  BP: (121-156)/(48-70) 121/48      Objective:  General Appearance:  In no acute distress (Morbidly obese though looks younger than age).    Vital signs: (most recent): Blood pressure 121/48, pulse 68, temperature 96.5 °F (35.8 °C), temperature source Oral, resp. rate 18, height 62\" (157.5 cm), weight 227 lb 12.8 oz (103 kg), SpO2 90 %.    Lungs:  There are decreased breath sounds.  No wheezes, rales or rhonchi.    Heart: Normal rate.  Regular rhythm.  No murmur.   Abdomen: Abdomen is soft and non-distended.  (Obese)Bowel sounds are normal.   There is generalized tenderness.  (Mild).     Extremities: There is dependent edema.  (2-3+ edema.  Mild erythema at shins, right more than left)  Neurological: Patient is alert.          Results Review:    I reviewed the patient's new clinical results.      Results from last 7 days  Lab Units 02/08/17  0407 02/07/17  0555   WBC 10*3/mm3 5.62 6.13   HEMOGLOBIN g/dL 10.0* 10.0*   PLATELETS 10*3/mm3 179 182       Results from last 7 days  Lab Units 02/08/17  0407 02/07/17  0555   SODIUM mmol/L 140 140   POTASSIUM mmol/L 4.2 5.1   CHLORIDE mmol/L 101 103   TOTAL CO2 mmol/L 25.7 21.5*   BUN mg/dL 49* 53*   CREATININE mg/dL 1.62* 1.77*   CALCIUM mg/dL 9.7 9.4   GLUCOSE mg/dL 121* 125*         Hemoglobin A1C:  Lab Results   Component Value Date    HGBA1C 6.04 (H) 02/05/2017       Glucose Range:  GLUCOSE   Date/Time Value Ref Range Status   02/08/2017 1104 198 (H) 70 - 130 mg/dL Final   02/08/2017 0642 115 70 - 130 mg/dL Final   02/07/2017 2159 139 (H) 70 - 130 mg/dL Final   02/07/2017 1639 182 (H) 70 - 130 mg/dL Final   02/07/2017 1151 195 (H) 70 - " 130 mg/dL Final   02/07/2017 0611 127 70 - 130 mg/dL Final       Medication Review: Yes    Physical Therapy: Took a few steps with PT    Assessment/Plan     Active Hospital Problems (** Indicates Principal Problem)    Diagnosis Date Noted   • **Acute on chronic diastolic (congestive) heart failure [I50.33] 02/08/2017   • Acute congestive heart failure [I50.9] 02/05/2017   • COPD (chronic obstructive pulmonary disease) [J44.9] 02/05/2017   • PAD (peripheral artery disease) [I73.9] 02/05/2017   • CKD (chronic kidney disease) stage 3, GFR 30-59 ml/min [N18.3] 02/05/2017   • Diabetes mellitus with chronic kidney disease [E11.22] 02/05/2017   • Dementia [F03.90] 02/05/2017   • Cellulitis [L03.90] 02/05/2017   • Leg pain [M79.606] 02/05/2017      Resolved Hospital Problems    Diagnosis Date Noted Date Resolved   • MRSA infection [A49.02] 02/05/2017 02/08/2017       Assessment & Plan  1.  Acute and chronic diastolic congestive heart failure.  Improving.  Weight is down.  Leg swelling looks about the same to me today.  Continue Bumex drip.  Recheck labs in a.m.  Increase activity as tolerated  2.  Erythema both shins, right greater than left.  Stasis dermatitis plus minus cellulitis.  Continue Vanco through today  3.  Acute kidney injury/chronic kidney disease stage III.  Creatinine is improving.  4.  Diabetes mellitus type 2.  Sugars are starting to go back up.  Creatinine is improved.  Will increase Levemir by 2 units.  5.  Dementia.  Continue medications.  6.  Anemia.  Hemoglobin 9.3 in 2014.  Probably secondary to chronic kidney disease.  TSH normal.  MCV is normal but RDW is high.  We'll check B12, iron and folate      Ellen Chakraborty MD  02/08/17  2:57 PM

## 2017-02-08 NOTE — PLAN OF CARE
Problem: Patient Care Overview (Adult)  Goal: Plan of Care Review  Outcome: Ongoing (interventions implemented as appropriate)    02/08/17 0944   Coping/Psychosocial Response Interventions   Plan Of Care Reviewed With patient   Patient Care Overview   Progress progress towards functional goals is fair   Outcome Evaluation   Outcome Summary/Follow up Plan Pt with extreme pain in the abdomen limitingfhwer ability to be more I and safe with activity

## 2017-02-08 NOTE — PLAN OF CARE
Problem: Patient Care Overview (Adult)  Goal: Plan of Care Review  Outcome: Ongoing (interventions implemented as appropriate)    02/08/17 0645   Coping/Psychosocial Response Interventions   Plan Of Care Reviewed With patient   Patient Care Overview   Progress no change   Outcome Evaluation   Outcome Summary/Follow up Plan VSS. VOIDING PER BEDPAN. 02 SAT STABLE ON 2L/M N/C.         Problem: Infection, Risk/Actual (Adult)  Goal: Infection Prevention/Resolution  Outcome: Ongoing (interventions implemented as appropriate)    Problem: Cardiac: Heart Failure (Adult)  Goal: Signs and Symptoms of Listed Potential Problems Will be Absent or Manageable (Cardiac: Heart Failure)  Outcome: Ongoing (interventions implemented as appropriate)

## 2017-02-08 NOTE — PROGRESS NOTES
Acute Care - Physical Therapy Treatment Note  [facility]     Patient Name: Halley Becerra  : 1931  MRN: [id number]  Today's Date: 2017  Onset of Illness/Injury or Date of Surgery Date: 17  Date of Referral to PT: 17  Referring Physician: Ellen Chairez    Admit Date: 2017    Visit Dx:    ICD-10-CM ICD-9-CM   1. Acute congestive heart failure, unspecified congestive heart failure type I50.9 428.0   2. Pneumonia of both lungs due to infectious organism, unspecified part of lung J18.9 483.8   3. Cellulitis of lower extremity, unspecified laterality L03.119 682.6   4. Muscle weakness (generalized) M62.81 728.87     Patient Active Problem List   Diagnosis   • Acute congestive heart failure   • MRSA infection   • COPD (chronic obstructive pulmonary disease)   • PAD (peripheral artery disease)   • CKD (chronic kidney disease)   • Pneumonia   • Diabetes mellitus   • Dementia   • Cellulitis   • Leg pain               Adult Rehabilitation Note       17 0900          Rehab Assessment/Intervention    Discipline physical therapy assistant  -CW      Document Type therapy note (daily note)  -CW      Subjective Information agree to therapy;complains of;weakness;fatigue;pain  -CW      Patient Effort, Rehab Treatment adequate  -CW      Precautions/Limitations fall precautions  -CW      Recorded by [CW] Brett Bennett      Pain Assessment    Pain Assessment 0-10  -CW      Pain Score 10  -CW      Post Pain Score 10  -CW      Pain Location Abdomen  -CW      Pain Orientation Right;Left  -CW      Recorded by [CW] Brett Bennett      Cognitive Assessment/Intervention    Current Cognitive/Communication Assessment functional  -CW      Orientation Status oriented x 4  -CW      Follows Commands/Answers Questions 100% of the time  -CW      Personal Safety WNL/WFL  -CW      Personal Safety Interventions fall prevention program maintained;gait belt;nonskid shoes/slippers when out of bed  -CW       Recorded by [ARON] Brett Bennett      Bed Mobility, Assessment/Treatment    Bed Mobility, Scoot/Bridge, [place] dependent (less than 25% patient effort);2 person assist required  -CW      Bed Mob, Supine to Sit, [place] moderate assist (50% patient effort);2 person assist required  -CW      Bed Mob, Sit to Supine, [place] moderate assist (50% patient effort);2 person assist required  -CW      Recorded by [CW] Brett Bennett      Transfer Assessment/Treatment    Transfers, Sit-Stand [place] maximum assist (25% patient effort);2 person assist required;hand held assist  -CW      Transfers, Stand-Sit [place] maximum assist (25% patient effort);2 person assist required;hand held assist  -CW      Transfers, Sit-Stand-Sit, Assist Device rolling walker  -CW      Recorded by [ARON] Brett Bennett      Gait Assessment/Treatment    Gait, [place] Level maximum assist (25% patient effort)  -CW      Gait, Assistive Device rolling walker  -CW      Gait, Distance (Feet) 3  -CW      Gait, Gait Deviations marco a decreased;step length decreased;stride length decreased  -CW      Gait, Comment transfer to bed from chair  -CW      Recorded by [ARON] Brett Bennett      Therapy Exercises    Bilateral Lower Extremities AROM:;ankle pumps/circles;15 reps;hip flexion;LAQ  -CW      Recorded by [ARON] Brett Bennett      Positioning and Restraints    Pre-Treatment Position sitting in chair/recliner  -CW      Post Treatment Position bed  -CW      In Bed supine;call light within reach;encouraged to call for assist;exit alarm on  -CW      Recorded by [ARON] Brett Bennett        User Key  (r) = Recorded By, (t) = Taken By, (c) = Cosigned By    Initials Name Effective Dates    ARON Bennett 12/13/16 -                 IP PT Goals       02/06/17 1158          Bed Mobility PT LTG    Bed Mobility PT LTG, Date Established 02/06/17  -MS      Bed Mobility PT LTG, Time to Achieve 5 - 7 days  -MS       Bed Mobility PT LTG, Activity Type all bed mobility  -MS      Bed Mobility PT LTG, [place] Level minimum assist (75% patient effort);2 person assist required  -MS      Transfer Training PT LTG    Transfer Training PT LTG, Date Established 02/06/17  -MS      Transfer Training PT LTG, Time to Achieve 5 - 7 days  -MS      Transfer Training PT LTG, Activity Type sit to stand/stand to sit  -MS      Transfer Training PT LTG, [place] Level minimum assist (75% patient effort);2 person assist required  -MS      Transfer Training PT LTG, Assist Device walker, rolling  -MS      Transfer Training 2 PT LTG    Transfer Training PT 2 LTG, Date Established 02/06/17  -MS      Transfer Training PT 2 LTG, Time to Achieve 5 - 7 days  -MS      Transfer Training PT 2 LTG, Activity Type bed to chair /chair to bed  -MS      Transfer Training PT 2 LTG, [place] Level minimum assist (75% patient effort);2 person assist required  -MS      Transfer Training PT 2 LTG, Assist Device walker, rolling  -MS      Gait Training PT LTG    Gait Training Goal PT LTG, Date Established 02/06/17  -MS      Gait Training Goal PT LTG, Time to Achieve 5 - 7 days  -MS      Gait Training Goal PT LTG, [place] Level minimum assist (75% patient effort);2 person assist required  -MS      Gait Training Goal PT LTG, Assist Device walker, rolling  -MS      Gait Training Goal PT LTG, Distance to Achieve 30 feet  -MS        User Key  (r) = Recorded By, (t) = Taken By, (c) = Cosigned By    Initials Name Provider Type    MS Ari DEMPSEY Mihaela, PT Physical Therapist          Physical Therapy Education     Title: PT OT SLP Therapies (Done)     Topic: Physical Therapy (Done)     Point: Mobility training (Done)    Learning Progress Summary    Learner Readiness Method Response Comment Documented by Status   Patient Acceptance E,TB,D VEE CAMILO  CW 02/08/17 0912 Done    Acceptance D,E VEE OLIVIER  MS 02/06/17 1157 Done               Point: Home exercise program (Done)     Learning Progress Summary    Learner Readiness Method Response Comment Documented by Status   Patient Acceptance E,TB,D DU,VU  CW 02/08/17 0943 Done    Acceptance D,E NR,VU  MS 02/06/17 1158 Done               Point: Body mechanics (Done)    Learning Progress Summary    Learner Readiness Method Response Comment Documented by Status   Patient Acceptance E,TB,D DU,VU  CW 02/08/17 0943 Done    Acceptance D,E NR,VU  MS 02/06/17 1158 Done               Point: Precautions (Done)    Learning Progress Summary    Learner Readiness Method Response Comment Documented by Status   Patient Acceptance E,TB,D DU,VU   02/08/17 0943 Done    Acceptance D,E NR,VU  MS 02/06/17 1158 Done                      User Key     Initials Effective Dates Name Provider Type Discipline    MS 12/01/15 -  Ari Chairez, PT Physical Therapist PT     12/13/16 -  Brett Bennett Physical Therapy Assistant PT                    PT Recommendation and Plan  Anticipated Discharge Disposition: skilled nursing facility  Planned Therapy Interventions: balance training, bed mobility training, gait training, patient/family education, postural re-education, ROM (Range of Motion), strengthening, transfer training  PT Frequency: daily  Plan of Care Review  Plan Of Care Reviewed With: patient  Progress: progress towards functional goals is fair  Outcome Summary/Follow up Plan: Pt with extreme pain in the abdomen limitingfhwer ability to be more I and safe with activity          Outcome Measures       02/08/17 0900 02/06/17 1200       How much help from another person do you currently need...    Turning from your back to your side while in flat bed without using bedrails? 2  -CW 2  -MS     Moving from lying on back to sitting on the side of a flat bed without bedrails? 2  -CW 2  -MS     Moving to and from a bed to a chair (including a wheelchair)? 2  -CW 1  -MS     Standing up from a chair using your arms (e.g., wheelchair, bedside chair)? 2  -CW 2  -MS      Climbing 3-5 steps with a railing? 1  -CW 1  -MS     To walk in hospital room? 2  -CW 1  -MS     AM-PAC 6 Clicks Score 11  -CW 9  -MS     Functional Assessment    Outcome Measure Options AM-PAC 6 Clicks Basic Mobility (PT)  -CW AM-PAC 6 Clicks Basic Mobility (PT)  -MS       User Key  (r) = Recorded By, (t) = Taken By, (c) = Cosigned By    Initials Name Provider Type    MS Ari KE Chairez, PT Physical Therapist    CW Brett Bennett Physical Therapy Assistant           Time Calculation:         PT Charges       02/08/17 0948          Time Calculation    Start Time 0922  -CW      Stop Time 0948  -CW      Time Calculation (min) 26 min  -CW      PT Received On 02/08/17  -CW      PT - Next Appointment 02/09/17  -CW        User Key  (r) = Recorded By, (t) = Taken By, (c) = Cosigned By    Initials Name Provider Type    CW Brett Bennett Physical Therapy Assistant          Therapy Charges for Today     Code Description Service Date Service Provider Modifiers Qty    27780363531 HC PT THER PROC EA 15 MIN 2/8/2017 Brett Bennett GP 2    56249104455 HC PT THER SUPP EA 15 MIN 2/8/2017 Brett Bennett GP 2          PT G-Codes  Outcome Measure Options: AM-PAC 6 Clicks Basic Mobility (PT)    Brett Bennett  2/8/2017

## 2017-02-08 NOTE — PROGRESS NOTES
"Vancomycin Pharmacy to Dose - Follow up  Day: 4  Per Dr. CHANTEL Chakraborty  Indication: skin and soft tissue  Goal trough: 15-20 mcg/ml    Relevant clinical data and objective history reviewed:  85 y.o. female 62\" (157.5 cm) 238 lb 3.2 oz (108 kg)    Antimicrobials:  Day 4 - Vancomycin: pulse dose due to renal function    Vancomycin Dose History:   @ 1925 Vanc 2gm iv x1     Level:   @ 0440: 19.10 (~ 9 hours post dose)   Level:   @ 1116: 17.3  (~16 hours post dose)   @ 1601 Vanc 1750mg iv x 1    Level:   @ 0554: 27.2 (~14 hours post 2nd dose)   Level:   @ 0407: 20.5 (~36 hours post 2nd dose)    Renal:    Results from last 7 days  Lab Units 17  0407 17  0555 17  0440   CREATININE mg/dL 1.62* 1.77* 1.94*     Estimated Creatinine Clearance: 29.4 mL/min (by C-G formula based on Cr of 1.62).       Intake/Output Summary (Last 24 hours) at 17 0910  Last data filed at 17 0831   Gross per 24 hour   Intake   1560 ml   Output   4200 ml   Net  -2640 ml         Vitals:  Temp (24hrs), Av °F (36.7 °C), Min:97.7 °F (36.5 °C), Max:98.2 °F (36.8 °C)    Lab Results   Component Value Date    WBC 5.62 2017    WBC 6.13 2017    WBC 5.58 2017       Assessment/Plan:  -  Vancomycin level at up end of goal range this am.    -  Serum creatinine improving.  -  Will give one time bolus dose of Vancomycin 500mg iv x 1 today.  -  Noted current plan to d/c vancomycin after today's dose.  Will continue to follow until dosing consult d/c'ed.    Thanks,    Valencia Najera, PharmD  2017 8:27 AM      "

## 2017-02-08 NOTE — PROGRESS NOTES
LOS: 3 days   Patient Care Team:  Jono Verde Jr., MD as PCP - General (Geriatric Medicine)    Chief Complaint: acute diastolic CHF       Interval History: On bumex drip.  Diuresed another 2.6 L.  Leg edema/dermatitis improving.      Objective   Vital Signs  Temp:  [97.2 °F (36.2 °C)-98.2 °F (36.8 °C)] 97.2 °F (36.2 °C)  Heart Rate:  [66-93] 70  Resp:  [18-20] 20  BP: (133-156)/(50-70) 141/55    Intake/Output Summary (Last 24 hours) at 02/08/17 0910  Last data filed at 02/08/17 0831   Gross per 24 hour   Intake   1560 ml   Output   4200 ml   Net  -2640 ml       Comfortable NAD  PERRL, conjunctiva clear  Neck supple, no JVD or thyromegaly appreciated  S1/S2 RRR, no m/r/g  Lungs CTA B, normal effort  Abdomen S/NT/ND (+) BS, no HSM appreciated  Extremities warm, no clubbing, cyanosis  No visible or palpable skin lesions  A/Ox4, mood and affect appropriate    Results Review:        Results from last 7 days  Lab Units 02/08/17  0407 02/07/17  0555 02/06/17  0440   SODIUM mmol/L 140 140 140   POTASSIUM mmol/L 4.2 5.1 4.8   CHLORIDE mmol/L 101 103 103   TOTAL CO2 mmol/L 25.7 21.5* 22.1   BUN mg/dL 49* 53* 51*   CREATININE mg/dL 1.62* 1.77* 1.94*   GLUCOSE mg/dL 121* 125* 69   CALCIUM mg/dL 9.7 9.4 9.1       Results from last 7 days  Lab Units 02/05/17  1710   CK TOTAL U/L 32   TROPONIN T ng/mL <0.010       Results from last 7 days  Lab Units 02/08/17  0407 02/07/17  0555 02/06/17  0440   WBC 10*3/mm3 5.62 6.13 5.58   HEMOGLOBIN g/dL 10.0* 10.0* 9.5*   HEMATOCRIT % 31.5* 32.3* 30.4*   PLATELETS 10*3/mm3 179 182 172                       I reviewed the patient's new clinical results.  I personally viewed and interpreted the patient's EKG/Telemetry data        Medication Review:     aspirin 81 mg Oral Daily   budesonide-formoterol 2 puff Inhalation BID - RT   carvedilol 12.5 mg Oral BID With Meals   donepezil 5 mg Oral Nightly   enoxaparin 30 mg Subcutaneous Q24H   fluocinonide  Topical BID   insulin aspart 0-7 Units  Subcutaneous 4x Daily AC & at Bedtime   insulin aspart 5 Units Subcutaneous TID AC   insulin detemir 26 Units Subcutaneous Nightly   lisinopril 5 mg Oral Daily   miconazole  Topical BID   nystatin  Topical BID   potassium chloride 10 mEq Oral Daily   Vancomycin Pharmacy Intermittent Dosing  Does not apply Daily         bumetanide (BUMEX) infusion 1 mg/hr Last Rate: 1 mg/hr (02/08/17 0745)   Pharmacy to dose vancomycin         Assessment/Plan     Principal Problem:    Acute congestive heart failure  Active Problems:    MRSA infection    COPD (chronic obstructive pulmonary disease)    PAD (peripheral artery disease)    CKD (chronic kidney disease)    Pneumonia    Diabetes mellitus    Dementia    Cellulitis    Leg pain    Cr coming down with diuresis.  On bumex drip.  Will continue until at least tomorrow.    Brenton Stallworth MD  02/08/17  9:10 AM

## 2017-02-09 NOTE — PROGRESS NOTES
"Patient Name: Halley Becerra  :1931  85 y.o.      Patient Care Team:  Jono Verde Jr., MD as PCP - General (Geriatric Medicine)    Interval History:   bumex drip     Subjective:  Following for CHF     Objective   Vital Signs  Temp:  [96.5 °F (35.8 °C)-98.3 °F (36.8 °C)] 97.8 °F (36.6 °C)  Heart Rate:  [65-93] 67  Resp:  [18-20] 18  BP: (121-141)/(45-62) 126/51    Intake/Output Summary (Last 24 hours) at 17 0822  Last data filed at 17 0757   Gross per 24 hour   Intake   1780 ml   Output    400 ml   Net   1380 ml     Flowsheet Rows         First Filed Value    Admission Height  62\" (157.5 cm) Documented at 2017 1613    Admission Weight  229 lb (104 kg) Documented at 2017 1613          Physical Exam:   General Appearance:    Alert, cooperative, in no acute distress   Lungs:     Clear to auscultation.  Normal respiratory effort and rate.      Heart:    Regular rhythm and normal rate, normal S1 and S2, no murmurs, gallops or rubs.     Chest Wall:    No abnormalities observed   Abdomen:     Soft, nontender, positive bowel sounds.     Extremities:   legs are sore to touch.  She has wrinkled skin over both of her lower limbs.  She has pitting edema especially in her left thigh.       Results Review:      Results from last 7 days  Lab Units 17  0539   SODIUM mmol/L 141   POTASSIUM mmol/L 3.8   CHLORIDE mmol/L 101   TOTAL CO2 mmol/L 26.6   BUN mg/dL 45*   CREATININE mg/dL 1.51*   GLUCOSE mg/dL 107*   CALCIUM mg/dL 9.6       Results from last 7 days  Lab Units 17  1710   CK TOTAL U/L 32   TROPONIN T ng/mL <0.010       Results from last 7 days  Lab Units 17  0407   WBC 10*3/mm3 5.62   HEMOGLOBIN g/dL 10.0*   HEMATOCRIT % 31.5*   PLATELETS 10*3/mm3 179               Results from last 7 days  Lab Units 17  0113   MAGNESIUM mg/dL 2.1             Medication Review:     aspirin 81 mg Oral Daily   budesonide-formoterol 2 puff Inhalation BID - RT   carvedilol 12.5 mg Oral BID " With Meals   donepezil 5 mg Oral Nightly   enoxaparin 30 mg Subcutaneous Q24H   fluocinonide  Topical BID   insulin aspart 0-7 Units Subcutaneous 4x Daily AC & at Bedtime   insulin aspart 5 Units Subcutaneous TID AC   insulin detemir 28 Units Subcutaneous Nightly   lisinopril 5 mg Oral Daily   miconazole  Topical BID   nystatin  Topical BID   potassium chloride 10 mEq Oral Daily   Vancomycin Pharmacy Intermittent Dosing  Does not apply Daily          bumetanide (BUMEX) infusion 1 mg/hr Last Rate: 1 mg/hr (02/09/17 0444)   Pharmacy to dose vancomycin         Assessment/Plan     1.  Acute on chronic diastolic heart failure.  Normal LV systolic function with ejection fraction of 63%.  Grade 2 diastolic dysfunction.  No significant valvular heart disease.  Creatinine and BUN are improving on Bumex drip.  Continue.  Blood pressure and heart rate are stable.  2.  Moderate pulmonary hypertension.  3.  Diabetes  4.  Chronic kidney disease [place] 5.  Hypertension  6.  Coronary artery disease status post CABG  7.  Peripheral arterial disease with an intervention to her leg for ischemia and 2012.  8.  Chronic anemia  9.  Paroxysmal atrial fibrillation.  She was felt not to be a good anticoagulation candidate due to an abdominal hematoma from Lovenox. Currently NSR.     Her legs looked good but she still has edema in the thighs and buttocks area.  I recommend continuing Bumex drip today.    Brook Persaud MD, [facility]  02/09/17  8:22 AM

## 2017-02-09 NOTE — PLAN OF CARE
Problem: Patient Care Overview (Adult)  Goal: Plan of Care Review  Outcome: Ongoing (interventions implemented as appropriate)    02/09/17 0616   Coping/Psychosocial Response Interventions   Plan Of Care Reviewed With patient   Patient Care Overview   Progress improving   Outcome Evaluation   Outcome Summary/Follow up Plan CONTINUE TO MONITOR VS, LABS, PAIN, SAFETY, TURN Q 2       Goal: Adult Individualization and Mutuality  Outcome: Ongoing (interventions implemented as appropriate)    02/09/17 0616   Individualization   Patient Specific Interventions CONTINUE TO MONITOR SKIN         Problem: Infection, Risk/Actual (Adult)  Goal: Infection Prevention/Resolution  Outcome: Ongoing (interventions implemented as appropriate)    02/09/17 0616   Infection, Risk/Actual (Adult)   Infection Prevention/Resolution making progress toward outcome         Problem: Cardiac: Heart Failure (Adult)  Goal: Signs and Symptoms of Listed Potential Problems Will be Absent or Manageable (Cardiac: Heart Failure)  Outcome: Ongoing (interventions implemented as appropriate)    02/09/17 0616   Cardiac: Heart Failure   Problems Assessed (Heart Failure) all   Problems Present (Heart Failure) cardiac pump dysfunction;decreased quality of life

## 2017-02-09 NOTE — PROGRESS NOTES
Acute Care - Physical Therapy Treatment Note  [facility]     Patient Name: Halley Becerra  : 1931  MRN: [id number]  Today's Date: 2017  Onset of Illness/Injury or Date of Surgery Date: 17  Date of Referral to PT: 17  Referring Physician: Ellen Chairez    Admit Date: 2017    Visit Dx:    ICD-10-CM ICD-9-CM   1. Acute congestive heart failure, unspecified congestive heart failure type I50.9 428.0   2. Pneumonia of both lungs due to infectious organism, unspecified part of lung J18.9 483.8   3. Cellulitis of lower extremity, unspecified laterality L03.119 682.6   4. Muscle weakness (generalized) M62.81 728.87     Patient Active Problem List   Diagnosis   • Acute congestive heart failure   • COPD (chronic obstructive pulmonary disease)   • PAD (peripheral artery disease)   • CKD (chronic kidney disease) stage 3, GFR 30-59 ml/min   • Diabetes mellitus with chronic kidney disease   • Dementia   • Cellulitis   • Leg pain   • Acute on chronic diastolic (congestive) heart failure               Adult Rehabilitation Note       17 1500 17 0900       Rehab Assessment/Intervention    Discipline physical therapy assistant  -CW physical therapy assistant  -CW     Document Type therapy note (daily note)  -CW therapy note (daily note)  -CW     Subjective Information agree to therapy;complains of;weakness;pain  -CW agree to therapy;complains of;weakness;fatigue;pain  -CW     Patient Effort, Rehab Treatment adequate  -CW adequate  -CW     Precautions/Limitations fall precautions  -CW fall precautions  -CW     Recorded by [CW] Brett Bennett [CW] Brett Bennett     Pain Assessment    Pain Assessment 0-10  -CW 0-10  -CW     Pain Score 10  -CW 10  -CW     Post Pain Score  10  -CW     Pain Location Abdomen  -CW Abdomen  -CW     Pain Orientation Right;Left  -CW Right;Left  -CW     Recorded by [CW] Brett Bennett [CW] Brett Bennett     Cognitive Assessment/Intervention     Current Cognitive/Communication Assessment functional  -CW functional  -CW     Orientation Status oriented x 4  -CW oriented x 4  -CW     Follows Commands/Answers Questions 100% of the time  -% of the time  -CW     Personal Safety WNL/WFL  -CW WNL/WFL  -CW     Personal Safety Interventions fall prevention program maintained;gait belt;nonskid shoes/slippers when out of bed  -CW fall prevention program maintained;gait belt;nonskid shoes/slippers when out of bed  -CW     Recorded by [CW] Brett Bennett [CW] Brett Bennett     Bed Mobility, Assessment/Treatment    Bed Mobility, Scoot/Bridge, Vance dependent (less than 25% patient effort);2 person assist required  -CW dependent (less than 25% patient effort);2 person assist required  -CW     Bed Mob, Supine to Sit, [place] moderate assist (50% patient effort);2 person assist required  -CW moderate assist (50% patient effort);2 person assist required  -CW     Bed Mob, Sit to Supine, [place] moderate assist (50% patient effort);2 person assist required  -CW moderate assist (50% patient effort);2 person assist required  -CW     Recorded by [CW] Brett Bennett [CW] Brett Bennett     Transfer Assessment/Treatment    Transfers, Sit-Stand [place] maximum assist (25% patient effort);2 person assist required;hand held assist  -CW maximum assist (25% patient effort);2 person assist required;hand held assist  -CW     Transfers, Stand-Sit [place] maximum assist (25% patient effort);2 person assist required;hand held assist  -CW maximum assist (25% patient effort);2 person assist required;hand held assist  -CW     Transfers, Sit-Stand-Sit, Assist Device rolling walker  -CW rolling walker  -CW     Recorded by [CW] Brett Bennett [CW] Brett Bennett     Gait Assessment/Treatment    Gait, [place] Level --  -CW maximum assist (25% patient effort)  -CW     Gait, Assistive Device --  -CW rolling walker  -CW     Gait, Distance  (Feet)  3  -CW     Gait, Gait Deviations  marco a decreased;step length decreased;stride length decreased  -CW     Gait, Comment  transfer to bed from chair  -CW     Recorded by [CW] Brett Bennett [CW] Brett Bennett     Therapy Exercises    Bilateral Lower Extremities AROM:;ankle pumps/circles;15 reps;hip flexion;LAQ  -CW AROM:;ankle pumps/circles;15 reps;hip flexion;LAQ  -CW     Recorded by [CW] Brett Bennett [CW] Brett Bennett     Positioning and Restraints    Pre-Treatment Position in bed  -CW sitting in chair/recliner  -CW     Post Treatment Position bed  -CW bed  -CW     In Bed supine;call light within reach;encouraged to call for assist;exit alarm on  -CW supine;call light within reach;encouraged to call for assist;exit alarm on  -CW     Recorded by [CW] Brett Bennett [CW] Brett Bennett       User Key  (r) = Recorded By, (t) = Taken By, (c) = Cosigned By    Initials Name Effective Dates     Brett Bennett 12/13/16 -                 IP PT Goals       02/06/17 1158          Bed Mobility PT LTG    Bed Mobility PT LTG, Date Established 02/06/17  -MS      Bed Mobility PT LTG, Time to Achieve 5 - 7 days  -MS      Bed Mobility PT LTG, Activity Type all bed mobility  -MS      Bed Mobility PT LTG, [place] Level minimum assist (75% patient effort);2 person assist required  -MS      Transfer Training PT LTG    Transfer Training PT LTG, Date Established 02/06/17  -MS      Transfer Training PT LTG, Time to Achieve 5 - 7 days  -MS      Transfer Training PT LTG, Activity Type sit to stand/stand to sit  -MS      Transfer Training PT LTG, [place] Level minimum assist (75% patient effort);2 person assist required  -MS      Transfer Training PT LTG, Assist Device walker, rolling  -MS      Transfer Training 2 PT LTG    Transfer Training PT 2 LTG, Date Established 02/06/17  -MS      Transfer Training PT 2 LTG, Time to Achieve 5 - 7 days  -MS      Transfer Training PT 2 LTG, Activity  Type bed to chair /chair to bed  -MS      Transfer Training PT 2 LTG, [place] Level minimum assist (75% patient effort);2 person assist required  -MS      Transfer Training PT 2 LTG, Assist Device walker, rolling  -MS      Gait Training PT LTG    Gait Training Goal PT LTG, Date Established 02/06/17  -MS      Gait Training Goal PT LTG, Time to Achieve 5 - 7 days  -MS      Gait Training Goal PT LTG, [place] Level minimum assist (75% patient effort);2 person assist required  -MS      Gait Training Goal PT LTG, Assist Device walker, rolling  -MS      Gait Training Goal PT LTG, Distance to Achieve 30 feet  -MS        User Key  (r) = Recorded By, (t) = Taken By, (c) = Cosigned By    Initials Name Provider Type    MS Ari DEMPSEY Mihaela, PT Physical Therapist          Physical Therapy Education     Title: PT OT SLP Therapies (Done)     Topic: Physical Therapy (Done)     Point: Mobility training (Done)    Learning Progress Summary    Learner Readiness Method Response Comment Documented by Status   Patient Acceptance E,TB DU,[facility] 02/09/17 1554 Done    Acceptance E,TB,D DU,[facility] 02/08/17 0943 Done    Acceptance D,E NR,[facility] 02/06/17 1158 Done               Point: Home exercise program (Done)    Learning Progress Summary    Learner Readiness Method Response Comment Documented by Status   Patient Acceptance E,TB DU,[facility] 02/09/17 1554 Done    Acceptance E,TB,D DU,[facility] 02/08/17 0943 Done    Acceptance D,E NR,[facility] 02/06/17 1158 Done               Point: Body mechanics (Done)    Learning Progress Summary    Learner Readiness Method Response Comment Documented by Status   Patient Acceptance E,TB DU,[facility] 02/09/17 1554 Done    Acceptance E,TB,D DU,[facility] 02/08/17 0943 Done    Acceptance D,E NR,[facility] 02/06/17 1158 Done               Point: Precautions (Done)    Learning Progress Summary    Learner Readiness Method Response Comment Documented by Status   Patient Acceptance E,TB DU,[facility] 02/09/17 1554 Done     Acceptance E,TB,D DU,VEE  CW 02/08/17 0943 Done    Acceptance D,E NR,VEE  MS 02/06/17 1158 Done                      User Key     Initials Effective Dates Name Provider Type Discipline    MS 12/01/15 -  Ari Chairez, PT Physical Therapist PT    CW 12/13/16 -  Brett Bennett Physical Therapy Assistant PT                    PT Recommendation and Plan  Anticipated Discharge Disposition: skilled nursing facility  Planned Therapy Interventions: balance training, bed mobility training, gait training, patient/family education, postural re-education, ROM (Range of Motion), strengthening, transfer training  PT Frequency: daily  Plan of Care Review  Plan Of Care Reviewed With: patient  Progress: progress towards functional goals is fair  Outcome Summary/Follow up Plan: Pt is continueing to have increased pain with transfers when standing with RWX          Outcome Measures       02/09/17 1500 02/08/17 0900       How much help from another person do you currently need...    Turning from your back to your side while in flat bed without using bedrails? 2  -CW 2  -CW     Moving from lying on back to sitting on the side of a flat bed without bedrails? 2  -CW 2  -CW     Moving to and from a bed to a chair (including a wheelchair)? 2  -CW 2  -CW     Standing up from a chair using your arms (e.g., wheelchair, bedside chair)? 2  -CW 2  -CW     Climbing 3-5 steps with a railing? 1  -CW 1  -CW     To walk in hospital room? 2  -CW 2  -CW     AM-PAC 6 Clicks Score 11  -CW 11  -CW     Functional Assessment    Outcome Measure Options AM-PAC 6 Clicks Basic Mobility (PT)  -CW AM-PAC 6 Clicks Basic Mobility (PT)  -CW       User Key  (r) = Recorded By, (t) = Taken By, (c) = Cosigned By    Initials Name Provider Type    CW Brett Bennett Physical Therapy Assistant           Time Calculation:         PT Charges       02/09/17 1556 02/09/17 1022       Time Calculation    Start Time 1538  -CW      Stop Time 1556  -CW      Time Calculation  (min) 18 min  -CW      PT Received On 02/09/17  -CW      PT - Next Appointment 02/10/17  -CW 02/09/17  -CW       User Key  (r) = Recorded By, (t) = Taken By, (c) = Cosigned By    Initials Name Provider Type    CW Brett Bennett Physical Therapy Assistant          Therapy Charges for Today     Code Description Service Date Service Provider Modifiers Qty    99215772463 HC PT THER PROC EA 15 MIN 2/8/2017 Brett Bennett GP 2    36560595520 HC PT THER SUPP EA 15 MIN 2/8/2017 Brett Bennett GP 2    40207403667 HC PT THER PROC EA 15 MIN 2/9/2017 Brett Bennett GP 1    63227674976 HC PT THER SUPP EA 15 MIN 2/9/2017 Brett Bennett GP 1          PT G-Codes  Outcome Measure Options: AM-PAC 6 Clicks Basic Mobility (PT)    Brett Bennett  2/9/2017

## 2017-02-09 NOTE — SIGNIFICANT NOTE
02/09/17 1022   Rehab Treatment   Discipline physical therapy assistant   Treatment Not Performed patient/family declined treatment  (Pt with increase in pain and wanting therapy to check back in PM)   Recommendation   PT - Next Appointment 02/09/17

## 2017-02-09 NOTE — PROGRESS NOTES
[facility]     LOS: 4 days     Subjective:  No cp  No soa    Objective:    Vital Signs:  Temp:  [97.7 °F (36.5 °C)-98.7 °F (37.1 °C)] 98.7 °F (37.1 °C)  Heart Rate:  [64-72] 64  Resp:  [18] 18  BP: (126-154)/(45-62) 154/58    General Appearance: no acute distress, appears comfortable  Heart: regular rate and rhythm  Lungs: clear to auscultation bilaterally, respirations unlabored, good air entry  Abdomen: soft, non-tender, no guarding, no rebound, non-distended  Extremeties: no edema, no cyanosis  Neurology: speech normal, mental status normal  Mental Status: alert, oriented  + body wall edema      Results Review:    GLUCOSE   Date Value Ref Range Status   02/09/2017 107 (H) 65 - 99 mg/dL Final   02/08/2017 121 (H) 65 - 99 mg/dL Final   02/07/2017 125 (H) 65 - 99 mg/dL Final       Results from last 7 days  Lab Units 02/08/17  0407   WBC 10*3/mm3 5.62   HEMOGLOBIN g/dL 10.0*   HEMATOCRIT % 31.5*   PLATELETS 10*3/mm3 179       Results from last 7 days  Lab Units 02/09/17  0539  02/05/17  1710   SODIUM mmol/L 141  < > 140   POTASSIUM mmol/L 3.8  < > 5.0   CHLORIDE mmol/L 101  < > 102   TOTAL CO2 mmol/L 26.6  < > 22.6   BUN mg/dL 45*  < > 52*   CREATININE mg/dL 1.51*  < > 1.99*   CALCIUM mg/dL 9.6  < > 9.1   BILIRUBIN mg/dL  --   --  0.3   ALK PHOS U/L  --   --  94   ALT (SGPT) U/L  --   --  13   AST (SGOT) U/L  --   --  18   GLUCOSE mg/dL 107*  < > 115*   < > = values in this interval not displayed.        Results from last 7 days  Lab Units 02/09/17  0113   MAGNESIUM mg/dL 2.1         Results from last 7 days  Lab Units 02/05/17  1710   CK TOTAL U/L 32   TROPONIN T ng/mL <0.010       I have reviewed daily medications and changes in CPOE    Scheduled meds    aspirin 81 mg Oral Daily   budesonide-formoterol 2 puff Inhalation BID - RT   carvedilol 12.5 mg Oral BID With Meals   donepezil 5 mg Oral Nightly   enoxaparin 30 mg Subcutaneous Q24H   fluocinonide  Topical BID   insulin aspart 0-7  Units Subcutaneous 4x Daily AC & at Bedtime   insulin aspart 5 Units Subcutaneous TID AC   insulin detemir 28 Units Subcutaneous Nightly   lisinopril 5 mg Oral Daily   miconazole  Topical BID   nystatin  Topical BID   potassium chloride 10 mEq Oral Daily   Vancomycin Pharmacy Intermittent Dosing  Does not apply Daily         bumetanide (BUMEX) infusion 1 mg/hr Last Rate: 1 mg/hr (02/09/17 1620)   Pharmacy to dose vancomycin       PRN meds  dextrose  •  dextrose  •  glucagon (human recombinant)  •  miconazole  •  Pharmacy to dose vancomycin  •  Insert peripheral IV **AND** sodium chloride    Assessment:    Principal Problem:    Acute on chronic diastolic (congestive) heart failure  Active Problems:    Acute congestive heart failure    COPD (chronic obstructive pulmonary disease)    PAD (peripheral artery disease)    CKD (chronic kidney disease) stage 3, GFR 30-59 ml/min    Diabetes mellitus with chronic kidney disease    Dementia    Cellulitis    Leg pain    1. Acute and chronic diastolic congestive heart failure. Improving. Legs without edema but still with body wall edema. Still on bumex    2. Erythema both shins, procal remains normal. Shins are tender but don't see any sign of infection, will stop vancomycin    3. Acute kidney injury/chronic kidney disease stage III. Creatinine is improving.    4. Diabetes mellitus type 2. Glucoses are good, monitor    5. Dementia. Continue medications.    6. Anemia. Hemoglobin 9.3 in 2014.  Probably secondary to chronic kidney disease. TSH normal. MCV is normal but RDW is high. We'll check  iron and folate.  b12 is ok        Emiliano Patel MD  02/09/17  6:00 PM

## 2017-02-10 NOTE — PLAN OF CARE
Problem: Patient Care Overview (Adult)  Goal: Plan of Care Review  Outcome: Ongoing (interventions implemented as appropriate)    02/09/17 1950   Coping/Psychosocial Response Interventions   Plan Of Care Reviewed With patient   Patient Care Overview   Progress no change   Outcome Evaluation   Outcome Summary/Follow up Plan SATs on room air 88%. Placed on 2LNC SATs 92-94%. Patient states only wears oxygen at HS. Abdominal edema, shanon area edema, and upper legs and hips 3-4+. Redness noted. Lower legs 3+ with redness. Skin tears noted on left arm and elbow. IV Bumex continues. Continue to monitor edema, output, pain level d/t cellulitis.        Goal: Adult Individualization and Mutuality  Outcome: Ongoing (interventions implemented as appropriate)  Goal: Discharge Needs Assessment  Outcome: Ongoing (interventions implemented as appropriate)    Problem: Infection, Risk/Actual (Adult)  Goal: Infection Prevention/Resolution  Outcome: Ongoing (interventions implemented as appropriate)    Problem: Cardiac: Heart Failure (Adult)  Goal: Signs and Symptoms of Listed Potential Problems Will be Absent or Manageable (Cardiac: Heart Failure)  Outcome: Ongoing (interventions implemented as appropriate)

## 2017-02-10 NOTE — PROGRESS NOTES
[facility]     LOS: 5 days     Subjective:  No cp  No soa  Eating some  No nausea and vomiting    Objective:    Vital Signs:  Temp:  [97.9 °F (36.6 °C)-98.7 °F (37.1 °C)] 98.4 °F (36.9 °C)  Heart Rate:  [60-69] 63  Resp:  [16-18] 18  BP: (118-154)/(38-60) 132/54    General Appearance: no acute distress, appears comfortable  Heart: regular rate and rhythm  Lungs: clear to auscultation bilaterally, respirations unlabored, good air entry  Abdomen: soft, non-tender, no guarding, no rebound, non-distended  Extremeties: no edema, no cyanosis  Neurology: speech normal, mental status normal  Mental Status: alert, oriented  + body wall edema      Results Review:    GLUCOSE   Date Value Ref Range Status   02/10/2017 96 65 - 99 mg/dL Final   02/09/2017 107 (H) 65 - 99 mg/dL Final   02/08/2017 121 (H) 65 - 99 mg/dL Final       Results from last 7 days  Lab Units 02/10/17  0510   WBC 10*3/mm3 6.25   HEMOGLOBIN g/dL 9.8*   HEMATOCRIT % 31.8*   PLATELETS 10*3/mm3 172       Results from last 7 days  Lab Units 02/10/17  0510   SODIUM mmol/L 144   POTASSIUM mmol/L 3.7   CHLORIDE mmol/L 102   TOTAL CO2 mmol/L 30.5*   BUN mg/dL 45*   CREATININE mg/dL 1.50*   CALCIUM mg/dL 9.6   BILIRUBIN mg/dL 0.4   ALK PHOS U/L 77   ALT (SGPT) U/L 10   AST (SGOT) U/L 16   GLUCOSE mg/dL 96           Results from last 7 days  Lab Units 02/09/17  0113   MAGNESIUM mg/dL 2.1         Results from last 7 days  Lab Units 02/05/17  1710   CK TOTAL U/L 32   TROPONIN T ng/mL <0.010       I have reviewed daily medications and changes in CPOE    Scheduled meds    aspirin 81 mg Oral Daily   budesonide-formoterol 2 puff Inhalation BID - RT   carvedilol 12.5 mg Oral BID With Meals   donepezil 5 mg Oral Nightly   enoxaparin 30 mg Subcutaneous Q24H   fluocinonide  Topical BID   insulin aspart 0-7 Units Subcutaneous 4x Daily AC & at Bedtime   insulin aspart 5 Units Subcutaneous TID AC   insulin detemir 28 Units Subcutaneous Nightly    lisinopril 5 mg Oral Daily   miconazole  Topical BID   nystatin  Topical BID   potassium chloride 10 mEq Oral Daily         bumetanide (BUMEX) infusion 1 mg/hr Last Rate: 1 mg/hr (02/10/17 0419)     PRN meds  dextrose  •  dextrose  •  glucagon (human recombinant)  •  miconazole  •  Insert peripheral IV **AND** sodium chloride    Assessment:    Principal Problem:    Acute on chronic diastolic (congestive) heart failure  Active Problems:    Acute congestive heart failure    COPD (chronic obstructive pulmonary disease)    PAD (peripheral artery disease)    CKD (chronic kidney disease) stage 3, GFR 30-59 ml/min    Diabetes mellitus with chronic kidney disease    Dementia    Cellulitis    Leg pain    1. Acute and chronic diastolic congestive heart failure. Improving. Legs without edema but still with body wall edema. Still on bumex    2. Erythema both shins, procal remains normal. Shins are tender but don't see any sign of infection, will stop vancomycin    3. Acute kidney injury/chronic kidney disease stage III. Creatinine is improving.    4. Diabetes mellitus type 2. Glucoses are good, monitor    5. Dementia. Continue medications.    6. Anemia. Hemoglobin 9.3 in 2014.  Probably secondary to chronic kidney disease. TSH normal. MCV is normal but RDW is high. Iron levels ok, %saturation is low, awaiting ferritin.  b12 is ok        Emiliano Patel MD  02/10/17  9:25 AM

## 2017-02-10 NOTE — PLAN OF CARE
Problem: Patient Care Overview (Adult)  Goal: Plan of Care Review    02/10/17 0934   Coping/Psychosocial Response Interventions   Plan Of Care Reviewed With patient   Patient Care Overview   Progress progress towards functional goals is fair   Outcome Evaluation   Outcome Summary/Follow up Plan Improved tolerance to functional activity this day with decreased assist required for overall functional mobility, continued ther. ex. program progression, and initiation of forward ambulation with use of RWX. Pt. remains limited overall by continued Bilateral L.E. and abdominal pain however.

## 2017-02-10 NOTE — PROGRESS NOTES
"    Patient Name: Halley Becerra  :1931  85 y.o.      Patient Care Team:  Jono Verde Jr., MD as PCP - General (Geriatric Medicine)    Chief Complaint: CHF    Interval History: On Bumex gtt. Edema is improving. crt of 1.5 today. BP and heart rate stable.        Objective   Vital Signs  Temp:  [98.3 °F (36.8 °C)-99.1 °F (37.3 °C)] 99.1 °F (37.3 °C)  Heart Rate:  [60-67] 67  Resp:  [16-18] 18  BP: (118-141)/(38-60) 141/60    Intake/Output Summary (Last 24 hours) at 02/10/17 7505  Last data filed at 02/10/17 1900   Gross per 24 hour   Intake    480 ml   Output    500 ml   Net    -20 ml     Flowsheet Rows         First Filed Value    Admission Height  62\" (157.5 cm) Documented at 2017 1613    Admission Weight  229 lb (104 kg) Documented at 2017 1613          Physical Exam:   General Appearance:    Alert, cooperative, in no acute distress   Lungs:     Clear to auscultation.  Normal respiratory effort and rate.      Heart:    Regular rhythm and normal rate, normal S1 and S2, no murmurs, gallops or rubs.     Chest Wall:    No abnormalities observed   Abdomen:     Soft, nontender, positive bowel sounds.     Extremities:   no cyanosis, clubbing or edema.  No marked joint deformities.  Adequate musculoskeletal strength.       Results Review:      Results from last 7 days  Lab Units 02/10/17  0510   SODIUM mmol/L 144   POTASSIUM mmol/L 3.7   CHLORIDE mmol/L 102   TOTAL CO2 mmol/L 30.5*   BUN mg/dL 45*   CREATININE mg/dL 1.50*   GLUCOSE mg/dL 96   CALCIUM mg/dL 9.6       Results from last 7 days  Lab Units 17  1710   CK TOTAL U/L 32   TROPONIN T ng/mL <0.010       Results from last 7 days  Lab Units 02/10/17  0510   WBC 10*3/mm3 6.25   HEMOGLOBIN g/dL 9.8*   HEMATOCRIT % 31.8*   PLATELETS 10*3/mm3 172           Results from last 7 days  Lab Units 17  0113   MAGNESIUM mg/dL 2.1                   Medication Review:     aspirin 81 mg Oral Daily   budesonide-formoterol 2 puff Inhalation BID - RT "   carvedilol 12.5 mg Oral BID With Meals   donepezil 5 mg Oral Nightly   enoxaparin 30 mg Subcutaneous Q24H   fluocinonide  Topical BID   insulin aspart 0-7 Units Subcutaneous 4x Daily AC & at Bedtime   insulin aspart 5 Units Subcutaneous TID AC   insulin detemir 28 Units Subcutaneous Nightly   lisinopril 5 mg Oral Daily   miconazole  Topical BID   nystatin  Topical BID   potassium chloride 10 mEq Oral Daily          bumetanide (BUMEX) infusion 1 mg/hr Last Rate: 1 mg/hr (02/10/17 0419)       Assessment/Plan     Acute on chronic diastoic CHF.  Moderate AS.  She is responding well to IV bumex infusion.  I'd continue bumex drip one more day, then consider transition to lower dose.  Cr continues to improve.    Brenton Stallworth MD  [facility]  02/10/17  10:55 PM

## 2017-02-10 NOTE — PROGRESS NOTES
"Acute Care - Physical Therapy Treatment Note  [facility]     Patient Name: Halley Becerra  : 1931  MRN: [id number]  Today's Date: 2/10/2017  Onset of Illness/Injury or Date of Surgery Date: 17  Date of Referral to PT: 17  Referring Physician: Ellen Chairez    Admit Date: 2017    Visit Dx:    ICD-10-CM ICD-9-CM   1. Acute congestive heart failure, unspecified congestive heart failure type I50.9 428.0   2. Pneumonia of both lungs due to infectious organism, unspecified part of lung J18.9 483.8   3. Cellulitis of lower extremity, unspecified laterality L03.119 682.6   4. Muscle weakness (generalized) M62.81 728.87     Patient Active Problem List   Diagnosis   • Acute congestive heart failure   • COPD (chronic obstructive pulmonary disease)   • PAD (peripheral artery disease)   • CKD (chronic kidney disease) stage 3, GFR 30-59 ml/min   • Diabetes mellitus with chronic kidney disease   • Dementia   • Cellulitis   • Leg pain   • Acute on chronic diastolic (congestive) heart failure               Adult Rehabilitation Note       02/10/17 0928 17 1500 17 0900    Rehab Assessment/Intervention    Discipline physical therapist  -MS physical therapy assistant  -CW physical therapy assistant  -CW    Document Type therapy note (daily note)  -MS therapy note (daily note)  -CW therapy note (daily note)  -CW    Subjective Information agree to therapy;complains of;fatigue;pain  -MS agree to therapy;complains of;weakness;pain  -CW agree to therapy;complains of;weakness;fatigue;pain  -CW    Patient Effort, Rehab Treatment adequate  -MS adequate  -CW adequate  -CW    Symptoms Noted Comment Pt. reports feeling \"better\" but still has c/o pain in her bilateral L.E.'s and abdomen.  Pt. reports pain only with mobility and when her L.E.'s are touched. Pt. reports she is eager to get back to her SNF.  -MS      Precautions/Limitations fall precautions   Dec. skin integrity; Contact isolation; exit " "alarm  -MS fall precautions  -CW fall precautions  -CW    Specific Treatment Considerations Precautions taken to help minimize/eliminate all friction/shearing forces to pt.'s skin during mobility.  -MS      Recorded by [MS] Ari Chairez, PT [CW] Brett Bennett [CW] Brett Bennett    Pain Assessment    Pain Assessment 0-10  -MS 0-10  -CW 0-10  -CW    Pain Score 5  -MS 10  -CW 10  -CW    Post Pain Score 5  -MS  10  -CW    Pain Location --   Abdomen; L.E.'s  -MS Abdomen  -CW Abdomen  -CW    Pain Orientation Right;Left  -MS Right;Left  -CW Right;Left  -CW    Pain Descriptors --   Pt. reports she \"doesnt know\" how to describe her pain.  -MS      Pain Intervention(s) Repositioned;Elevated;Rest  -MS      Recorded by [MS] Ari Chairez, PT [CW] Brett Bennett [CW] Brett Bennett    Cognitive Assessment/Intervention    Current Cognitive/Communication Assessment functional  -MS functional  -CW functional  -CW    Orientation Status oriented x 4  -MS oriented x 4  -CW oriented x 4  -CW    Follows Commands/Answers Questions 100% of the time  -% of the time  -% of the time  -CW    Personal Safety WNL/WFL  -MS WNL/WFL  -CW WNL/WFL  -CW    Personal Safety Interventions fall prevention program maintained;gait belt;nonskid shoes/slippers when out of bed;supervised activity  -MS fall prevention program maintained;gait belt;nonskid shoes/slippers when out of bed  -CW fall prevention program maintained;gait belt;nonskid shoes/slippers when out of bed  -CW    Recorded by [MS] Ari Chairez, PT [CW] Brett Bennett [CW] Brett Bennett    Bed Mobility, Assessment/Treatment    Bed Mobility, Scoot/Bridge, [place]  dependent (less than 25% patient effort);2 person assist required  -CW dependent (less than 25% patient effort);2 person assist required  -CW    Bed Mob, Supine to Sit, [place] moderate assist (50% patient effort);2 person assist required  -MS moderate assist (50% patient " effort);2 person assist required  -CW moderate assist (50% patient effort);2 person assist required  -CW    Bed Mob, Sit to Supine, [place] moderate assist (50% patient effort);2 person assist required  -MS moderate assist (50% patient effort);2 person assist required  -CW moderate assist (50% patient effort);2 person assist required  -CW    Recorded by [MS] Ari Chairez, PT [CW] Brett Bennett [CW] Brett Bennett    Transfer Assessment/Treatment    Transfers, Sit-Stand [place] moderate assist (50% patient effort);2 person assist required  -MS maximum assist (25% patient effort);2 person assist required;hand held assist  -CW maximum assist (25% patient effort);2 person assist required;hand held assist  -CW    Transfers, Stand-Sit [place] moderate assist (50% patient effort);2 person assist required  -MS maximum assist (25% patient effort);2 person assist required;hand held assist  -CW maximum assist (25% patient effort);2 person assist required;hand held assist  -CW    Transfers, Sit-Stand-Sit, Assist Device rolling walker  -MS rolling walker  -CW rolling walker  -CW    Transfer, Comment Performed x 2 for functional strength training.  -MS      Recorded by [MS] Ari Chairez, PT [CW] Brett Bennett [CW] Brett Bennett    Gait Assessment/Treatment    Gait, [place] Level moderate assist (50% patient effort);2 person assist required  -MS --  -CW maximum assist (25% patient effort)  -CW    Gait, Assistive Device rolling walker  -MS --  -CW rolling walker  -CW    Gait, Distance (Feet) 5   Forward/backward  -MS  3  -CW    Gait, Gait Deviations right:;left:;antalgic;marco a decreased;forward flexed posture;limb motion velocity decreased;step length decreased  -MS  marco a decreased;step length decreased;stride length decreased  -CW    Gait, Safety Issues sequencing ability decreased;step length decreased;weight-shifting ability decreased  -MS      Gait, Comment Pt. also able to  take 3-4 sidesteps up toward the head of the bed.  Pt. requires mod. verbal/tactile cues for posture correction and RWX guidance.  Fatigues quickly with all upright mobility.  -MS  transfer to bed from chair  -CW    Recorded by [MS] Ari Chairez, PT [CW] Brett Bennett [CW] Brett Bennett    Balance Skills Training    Sitting-Level of Assistance Contact guard  -MS      Sitting-Balance Support Feet supported;Right upper extremity supported;Left upper extremity supported  -MS      Recorded by [MS] Ari Chairez PT      Therapy Exercises    Bilateral Lower Extremities AROM:;10 reps;sitting;ankle pumps/circles;hip flexion;LAQ  -MS AROM:;ankle pumps/circles;15 reps;hip flexion;LAQ  -CW AROM:;ankle pumps/circles;15 reps;hip flexion;LAQ  -CW    Recorded by [MS] Ari Chairez, PT [CW] Brett Bennett [CW] Brett Bennett    Positioning and Restraints    Pre-Treatment Position in bed  -MS in bed  -CW sitting in chair/recliner  -CW    Post Treatment Position bed  -MS bed  -CW bed  -CW    In Bed notified nsg;supine;call light within reach;encouraged to call for assist;exit alarm on;with family/caregiver;RLE elevated;LLE elevated;R heel elevated;L heel elevated   All lines intact. V.S.S.  -MS supine;call light within reach;encouraged to call for assist;exit alarm on  -CW supine;call light within reach;encouraged to call for assist;exit alarm on  -CW    Recorded by [MS] Ari Chairez PT [CW] Brett Bennett [CW] Brett Bennett      User Key  (r) = Recorded By, (t) = Taken By, (c) = Cosigned By    Initials Name Effective Dates    MS Ari Chairez, PT 12/01/15 -     CW Brett Bennett 12/13/16 -                 IP PT Goals       02/06/17 1158          Bed Mobility PT LTG    Bed Mobility PT LTG, Date Established 02/06/17  -MS      Bed Mobility PT LTG, Time to Achieve 5 - 7 days  -MS      Bed Mobility PT LTG, Activity Type all bed mobility  -MS      Bed Mobility PT LTG, [place] Level  minimum assist (75% patient effort);2 person assist required  -MS      Transfer Training PT LTG    Transfer Training PT LTG, Date Established 02/06/17  -MS      Transfer Training PT LTG, Time to Achieve 5 - 7 days  -MS      Transfer Training PT LTG, Activity Type sit to stand/stand to sit  -MS      Transfer Training PT LTG, [place] Level minimum assist (75% patient effort);2 person assist required  -MS      Transfer Training PT LTG, Assist Device walker, rolling  -MS      Transfer Training 2 PT LTG    Transfer Training PT 2 LTG, Date Established 02/06/17  -MS      Transfer Training PT 2 LTG, Time to Achieve 5 - 7 days  -MS      Transfer Training PT 2 LTG, Activity Type bed to chair /chair to bed  -MS      Transfer Training PT 2 LTG, [place] Level minimum assist (75% patient effort);2 person assist required  -MS      Transfer Training PT 2 LTG, Assist Device walker, rolling  -MS      Gait Training PT LTG    Gait Training Goal PT LTG, Date Established 02/06/17  -MS      Gait Training Goal PT LTG, Time to Achieve 5 - 7 days  -MS      Gait Training Goal PT LTG, [place] Level minimum assist (75% patient effort);2 person assist required  -MS      Gait Training Goal PT LTG, Assist Device walker, rolling  -MS      Gait Training Goal PT LTG, Distance to Achieve 30 feet  -MS        User Key  (r) = Recorded By, (t) = Taken By, (c) = Cosigned By    Initials Name Provider Type    MS Ari DEMPSEY Mihaela, PT Physical Therapist          Physical Therapy Education     Title: PT OT SLP Therapies (Done)     Topic: Physical Therapy (Done)     Point: Mobility training (Done)    Learning Progress Summary    Learner Readiness Method Response Comment Documented by Status   Patient Acceptance JEAN MARIE RESENDIZ NR  MS 02/10/17 0934 Done    Acceptance CONOR RESENDIZ VU   02/09/17 1554 Done    Acceptance CONOR RESENDIZ D DU, VU   02/08/17 0943 Done    Acceptance ASTRID AJ VU  MS 02/06/17 1158 Done               Point: Home exercise program (Done)    Learning  Progress Summary    Learner Readiness Method Response Comment Documented by Status   Patient Acceptance E,D VU,NR  MS 02/10/17 0934 Done    Acceptance E,TB DU,VU  CW 02/09/17 1554 Done    Acceptance E,TB,D DU,VU  CW 02/08/17 0943 Done    Acceptance D,E NR,VU  MS 02/06/17 1158 Done               Point: Body mechanics (Done)    Learning Progress Summary    Learner Readiness Method Response Comment Documented by Status   Patient Acceptance E,D VU,NR  MS 02/10/17 0934 Done    Acceptance E,TB DU,VU  CW 02/09/17 1554 Done    Acceptance E,TB,D DU,VU  CW 02/08/17 0943 Done    Acceptance D,E NR,VU  MS 02/06/17 1158 Done               Point: Precautions (Done)    Learning Progress Summary    Learner Readiness Method Response Comment Documented by Status   Patient Acceptance E,D VU,NR  MS 02/10/17 0934 Done    Acceptance E,TB DU,VU  CW 02/09/17 1554 Done    Acceptance E,TB,D DU,VU  CW 02/08/17 0943 Done    Acceptance D,E NR,VU  MS 02/06/17 1158 Done                      User Key     Initials Effective Dates Name Provider Type Discipline    MS 12/01/15 -  Ari Chairez, PT Physical Therapist PT     12/13/16 -  Brett Bennett Physical Therapy Assistant PT                    PT Recommendation and Plan  Anticipated Discharge Disposition: skilled nursing facility  Planned Therapy Interventions: balance training, bed mobility training, gait training, patient/family education, postural re-education, ROM (Range of Motion), strengthening, transfer training  PT Frequency: daily  Plan of Care Review  Plan Of Care Reviewed With: patient  Progress: progress towards functional goals is fair  Outcome Summary/Follow up Plan: Improved tolerance to functional activity this day with decreased assist required for overall functional mobility, continued ther. ex. program progression, and initiation of forward ambulation with use of RWX.  Pt. remains limited overall by continued Bilateral L.E. and abdominal pain however.          Outcome  Measures       02/10/17 0900 02/09/17 1500 02/08/17 0900    How much help from another person do you currently need...    Turning from your back to your side while in flat bed without using bedrails? 2  -MS 2  -CW 2  -CW    Moving from lying on back to sitting on the side of a flat bed without bedrails? 2  -MS 2  -CW 2  -CW    Moving to and from a bed to a chair (including a wheelchair)? 2  -MS 2  -CW 2  -CW    Standing up from a chair using your arms (e.g., wheelchair, bedside chair)? 2  -MS 2  -CW 2  -CW    Climbing 3-5 steps with a railing? 1  -MS 1  -CW 1  -CW    To walk in hospital room? 2  -MS 2  -CW 2  -CW    AM-PAC 6 Clicks Score 11  -MS 11  -CW 11  -CW    Functional Assessment    Outcome Measure Options AM-PAC 6 Clicks Basic Mobility (PT)  -MS AM-PAC 6 Clicks Basic Mobility (PT)  -CW AM-PAC 6 Clicks Basic Mobility (PT)  -CW      User Key  (r) = Recorded By, (t) = Taken By, (c) = Cosigned By    Initials Name Provider Type    MS Ari Chairez, PT Physical Therapist    CW Brett Bennett Physical Therapy Assistant           Time Calculation:         PT Charges       02/10/17 0936          Time Calculation    Start Time 0902  -MS      Stop Time 0924  -MS      Time Calculation (min) 22 min  -MS      PT Received On 02/10/17  -MS      PT - Next Appointment 02/11/17  -MS        User Key  (r) = Recorded By, (t) = Taken By, (c) = Cosigned By    Initials Name Provider Type    MS Ari Chairez PT Physical Therapist          Therapy Charges for Today     Code Description Service Date Service Provider Modifiers Qty    41652553844 HC PT THER PROC EA 15 MIN 2/10/2017 Ari Chairez PT GP 1    53965482705 HC PT THER SUPP EA 15 MIN 2/10/2017 Ari Chairez PT GP 1          PT G-Codes  Outcome Measure Options: AM-PAC 6 Clicks Basic Mobility (PT)    Ari Chairez PT  2/10/2017

## 2017-02-10 NOTE — PLAN OF CARE
Problem: Patient Care Overview (Adult)  Goal: Plan of Care Review  Outcome: Ongoing (interventions implemented as appropriate)    02/10/17 0931   Coping/Psychosocial Response Interventions   Plan Of Care Reviewed With patient   Patient Care Overview   Progress no change       Goal: Adult Individualization and Mutuality  Outcome: Ongoing (interventions implemented as appropriate)    02/10/17 0931   Individualization   Patient Specific Goals Go back to rehab.       Goal: Discharge Needs Assessment  Outcome: Ongoing (interventions implemented as appropriate)    02/10/17 0931   Discharge Needs Assessment   Discharge Disposition still a patient         Problem: Infection, Risk/Actual (Adult)  Goal: Infection Prevention/Resolution  Outcome: Ongoing (interventions implemented as appropriate)    02/10/17 0931   Infection, Risk/Actual (Adult)   Infection Prevention/Resolution making progress toward outcome         Problem: Cardiac: Heart Failure (Adult)  Goal: Signs and Symptoms of Listed Potential Problems Will be Absent or Manageable (Cardiac: Heart Failure)  Outcome: Ongoing (interventions implemented as appropriate)    02/10/17 0931   Cardiac: Heart Failure   Problems Assessed (Heart Failure) all   Problems Present (Heart Failure) cardiac pump dysfunction;functional decline/self-care deficit;respiratory compromise

## 2017-02-10 NOTE — PLAN OF CARE
Problem: Patient Care Overview (Adult)  Goal: Plan of Care Review  Outcome: Ongoing (interventions implemented as appropriate)    02/10/17 1430 02/10/17 1703   Coping/Psychosocial Response Interventions   Plan Of Care Reviewed With patient --    Patient Care Overview   Progress --  progress towards functional goals is fair   Outcome Evaluation   Outcome Summary/Follow up Plan --  pt still refusing to get oob with nursing. tried to encourage her to do leg excersises in the bed. cont bumex drip. monior i and o.         Problem: Infection, Risk/Actual (Adult)  Goal: Infection Prevention/Resolution  Outcome: Ongoing (interventions implemented as appropriate)    Problem: Cardiac: Heart Failure (Adult)  Goal: Signs and Symptoms of Listed Potential Problems Will be Absent or Manageable (Cardiac: Heart Failure)  Outcome: Ongoing (interventions implemented as appropriate)

## 2017-02-11 NOTE — PROGRESS NOTES
[facility]     LOS: 6 days     Subjective:  No cp  No soa  Eating some  No nausea and vomiting    Objective:    Vital Signs:  Temp:  [98.8 °F (37.1 °C)-99.3 °F (37.4 °C)] 98.8 °F (37.1 °C)  Heart Rate:  [65-75] 75  Resp:  [14-20] 18  BP: (141-146)/(50-60) 146/57    General Appearance: no acute distress, appears comfortable  Heart: regular rate and rhythm  Lungs: clear to auscultation bilaterally, respirations unlabored, good air entry  Abdomen: soft, non-tender, no guarding, no rebound, non-distended  Extremeties: no edema, no cyanosis  Neurology: speech normal, mental status normal  Mental Status: alert, oriented  + body wall edema      Results Review:    GLUCOSE   Date Value Ref Range Status   02/10/2017 96 65 - 99 mg/dL Final   02/09/2017 107 (H) 65 - 99 mg/dL Final       Results from last 7 days  Lab Units 02/10/17  0510   WBC 10*3/mm3 6.25   HEMOGLOBIN g/dL 9.8*   HEMATOCRIT % 31.8*   PLATELETS 10*3/mm3 172       Results from last 7 days  Lab Units 02/10/17  0510   SODIUM mmol/L 144   POTASSIUM mmol/L 3.7   CHLORIDE mmol/L 102   TOTAL CO2 mmol/L 30.5*   BUN mg/dL 45*   CREATININE mg/dL 1.50*   CALCIUM mg/dL 9.6   BILIRUBIN mg/dL 0.4   ALK PHOS U/L 77   ALT (SGPT) U/L 10   AST (SGOT) U/L 16   GLUCOSE mg/dL 96           Results from last 7 days  Lab Units 02/09/17  0113   MAGNESIUM mg/dL 2.1         Results from last 7 days  Lab Units 02/05/17  1710   CK TOTAL U/L 32   TROPONIN T ng/mL <0.010       I have reviewed daily medications and changes in CPOE    Scheduled meds    aspirin 81 mg Oral Daily   budesonide-formoterol 2 puff Inhalation BID - RT   bumetanide 1 mg Oral BID   carvedilol 12.5 mg Oral BID With Meals   donepezil 5 mg Oral Nightly   enoxaparin 30 mg Subcutaneous Q24H   fluocinonide  Topical BID   insulin aspart 0-7 Units Subcutaneous 4x Daily AC & at Bedtime   insulin aspart 5 Units Subcutaneous TID AC   insulin detemir 28 Units Subcutaneous Nightly   lisinopril 5 mg Oral  Daily   miconazole  Topical BID   nystatin  Topical BID   potassium chloride 10 mEq Oral Daily   trolamine salicylate  Topical BID          PRN meds  dextrose  •  dextrose  •  glucagon (human recombinant)  •  miconazole  •  Insert peripheral IV **AND** sodium chloride    Assessment:    Principal Problem:    Acute on chronic diastolic (congestive) heart failure  Active Problems:    Acute congestive heart failure    COPD (chronic obstructive pulmonary disease)    PAD (peripheral artery disease)    CKD (chronic kidney disease) stage 3, GFR 30-59 ml/min    Diabetes mellitus with chronic kidney disease    Dementia    Cellulitis    Leg pain    1. Acute and chronic diastolic congestive heart failure. Improving. Legs without edema but still with body wall edema. Still on bumex, now on po    2. Erythema both shins, procal remains normal. Shins are tender but don't see any sign of infection, will stop vancomycin    3. Acute kidney injury/chronic kidney disease stage III. Creatinine is improving.    4. Diabetes mellitus type 2. Glucoses are good, monitor    5. Dementia. Continue medications.    6. Anemia. Hemoglobin 9.3 in 2014.  Probably secondary to chronic kidney disease. TSH normal. MCV is normal but RDW is high. Iron levels ok, %saturation is low,  b12 is ok     7. Leg pain- try cream, add neurontin     Emiliano Patel MD  02/11/17  2:21 PM

## 2017-02-11 NOTE — SIGNIFICANT NOTE
02/11/17 1503   Rehab Treatment   Discipline physical therapist   Treatment Not Performed patient/family declined treatment  (Pt and family states that she is having a lot of LE pain and MD is working on her pain control. Pt asked for PT to check back tomorrow.)   Recommendation   PT - Next Appointment 02/12/17

## 2017-02-11 NOTE — PLAN OF CARE
Problem: Patient Care Overview (Adult)  Goal: Plan of Care Review  Outcome: Ongoing (interventions implemented as appropriate)    02/11/17 0649   Coping/Psychosocial Response Interventions   Plan Of Care Reviewed With patient   Patient Care Overview   Progress improving   Outcome Evaluation   Outcome Summary/Follow up Plan Monitor labs, VS, pain.         Problem: Infection, Risk/Actual (Adult)  Goal: Infection Prevention/Resolution  Outcome: Ongoing (interventions implemented as appropriate)    02/11/17 0649   Infection, Risk/Actual (Adult)   Infection Prevention/Resolution making progress toward outcome         Problem: Cardiac: Heart Failure (Adult)  Goal: Signs and Symptoms of Listed Potential Problems Will be Absent or Manageable (Cardiac: Heart Failure)  Outcome: Ongoing (interventions implemented as appropriate)    02/11/17 0649   Cardiac: Heart Failure   Problems Assessed (Heart Failure) all   Problems Present (Heart Failure) cardiac pump dysfunction;decreased quality of life;fluid/electrolyte imbalance

## 2017-02-11 NOTE — PLAN OF CARE
Problem: Patient Care Overview (Adult)  Goal: Plan of Care Review  Outcome: Ongoing (interventions implemented as appropriate)    02/11/17 8639   Coping/Psychosocial Response Interventions   Plan Of Care Reviewed With patient;daughter;son   Patient Care Overview   Progress improving   Outcome Evaluation   Outcome Summary/Follow up Plan Off bumex gtt; changed to PO; pt continues to refuses PT/ambulation; started on pain medication/gabapentin; pain relieving lotion applied; educated on using IS         Problem: Cardiac: Heart Failure (Adult)  Goal: Signs and Symptoms of Listed Potential Problems Will be Absent or Manageable (Cardiac: Heart Failure)  Outcome: Ongoing (interventions implemented as appropriate)

## 2017-02-11 NOTE — PROGRESS NOTES
"    Patient Name: Halley Becerra  :1931  85 y.o.      Patient Care Team:  Jono Verde Jr., MD as PCP - General (Geriatric Medicine)    Chief Complaint: CHF    Interval History: On Bumex gtt. Edema is improving. Sleeping comfortably.       Objective   Vital Signs  Temp:  [98.3 °F (36.8 °C)-99.3 °F (37.4 °C)] 98.8 °F (37.1 °C)  Heart Rate:  [65-75] 75  Resp:  [14-20] 18  BP: (124-146)/(50-60) 146/57    Intake/Output Summary (Last 24 hours) at 17 1255  Last data filed at 17 0944   Gross per 24 hour   Intake    220 ml   Output   2300 ml   Net  -2080 ml     Flowsheet Rows         First Filed Value    Admission Height  62\" (157.5 cm) Documented at 2017 1613    Admission Weight  229 lb (104 kg) Documented at 2017 1613          Physical Exam:   General Appearance:    Alert, cooperative, in no acute distress   Lungs:     Clear to auscultation.  Normal respiratory effort and rate.      Heart:    Regular rhythm and normal rate, normal S1 and S2, no murmurs, gallops or rubs.     Chest Wall:    No abnormalities observed   Abdomen:     Soft, nontender, positive bowel sounds.     Extremities:   no cyanosis, clubbing or edema.  No marked joint deformities.  Adequate musculoskeletal strength.       Results Review:      Results from last 7 days  Lab Units 02/10/17  0510   SODIUM mmol/L 144   POTASSIUM mmol/L 3.7   CHLORIDE mmol/L 102   TOTAL CO2 mmol/L 30.5*   BUN mg/dL 45*   CREATININE mg/dL 1.50*   GLUCOSE mg/dL 96   CALCIUM mg/dL 9.6       Results from last 7 days  Lab Units 17  1710   CK TOTAL U/L 32   TROPONIN T ng/mL <0.010       Results from last 7 days  Lab Units 02/10/17  0510   WBC 10*3/mm3 6.25   HEMOGLOBIN g/dL 9.8*   HEMATOCRIT % 31.8*   PLATELETS 10*3/mm3 172           Results from last 7 days  Lab Units 17  0113   MAGNESIUM mg/dL 2.1                   Medication Review:     aspirin 81 mg Oral Daily   budesonide-formoterol 2 puff Inhalation BID - RT   carvedilol 12.5 mg Oral " BID With Meals   donepezil 5 mg Oral Nightly   enoxaparin 30 mg Subcutaneous Q24H   fluocinonide  Topical BID   insulin aspart 0-7 Units Subcutaneous 4x Daily AC & at Bedtime   insulin aspart 5 Units Subcutaneous TID AC   insulin detemir 28 Units Subcutaneous Nightly   lisinopril 5 mg Oral Daily   miconazole  Topical BID   nystatin  Topical BID   potassium chloride 10 mEq Oral Daily          bumetanide (BUMEX) infusion 1 mg/hr Last Rate: 1 mg/hr (02/11/17 0944)       Assessment/Plan     Acute on chronic diastoic CHF.  Moderate AS.  She is responding well to IV bumex infusion.  I will stop the IV bumex and change to orals today.    Brenton Stallworth MD  [facility]  02/11/17  12:55 PM

## 2017-02-12 NOTE — PROGRESS NOTES
[facility]     LOS: 7 days     Subjective:  No cp  No soa  Eating some  No nausea and vomiting    Objective:    Vital Signs:  Temp:  [98 °F (36.7 °C)-99.7 °F (37.6 °C)] 98 °F (36.7 °C)  Heart Rate:  [61-68] 68  Resp:  [20] 20  BP: (105-143)/(39-51) 143/48    General Appearance: no acute distress, appears comfortable  Heart: regular rate and rhythm  Lungs: clear to auscultation bilaterally, respirations unlabored, good air entry  Abdomen: soft, non-tender, no guarding, no rebound, non-distended  Extremeties: no edema, no cyanosis  Neurology: speech normal, mental status normal  Mental Status: alert, oriented  + body wall edema      Results Review:    GLUCOSE   Date Value Ref Range Status   02/12/2017 132 (H) 65 - 99 mg/dL Final   02/10/2017 96 65 - 99 mg/dL Final       Results from last 7 days  Lab Units 02/12/17  0519   WBC 10*3/mm3 7.15   HEMOGLOBIN g/dL 9.9*   HEMATOCRIT % 32.7*   PLATELETS 10*3/mm3 185       Results from last 7 days  Lab Units 02/12/17  0519 02/10/17  0510   SODIUM mmol/L 146* 144   POTASSIUM mmol/L 3.6 3.7   CHLORIDE mmol/L 101 102   TOTAL CO2 mmol/L 30.9* 30.5*   BUN mg/dL 44* 45*   CREATININE mg/dL 1.64* 1.50*   CALCIUM mg/dL 9.8 9.6   BILIRUBIN mg/dL  --  0.4   ALK PHOS U/L  --  77   ALT (SGPT) U/L  --  10   AST (SGOT) U/L  --  16   GLUCOSE mg/dL 132* 96           Results from last 7 days  Lab Units 02/09/17  0113   MAGNESIUM mg/dL 2.1         Results from last 7 days  Lab Units 02/05/17  1710   CK TOTAL U/L 32   TROPONIN T ng/mL <0.010       I have reviewed daily medications and changes in CPOE    Scheduled meds    aspirin 81 mg Oral Daily   budesonide-formoterol 2 puff Inhalation BID - RT   bumetanide 1 mg Oral BID   camphor-menthol  Topical BID   carvedilol 12.5 mg Oral BID With Meals   donepezil 5 mg Oral Nightly   enoxaparin 30 mg Subcutaneous Q24H   fluocinonide  Topical BID   gabapentin 100 mg Oral Q12H   insulin aspart 0-7 Units Subcutaneous 4x Daily AC &  at Bedtime   insulin aspart 5 Units Subcutaneous TID AC   insulin detemir 28 Units Subcutaneous Nightly   lisinopril 5 mg Oral Daily   miconazole  Topical BID   nystatin  Topical BID   potassium chloride 10 mEq Oral Daily          PRN meds  dextrose  •  dextrose  •  glucagon (human recombinant)  •  HYDROcodone-acetaminophen  •  miconazole  •  Insert peripheral IV **AND** sodium chloride    Assessment:    Principal Problem:    Acute on chronic diastolic (congestive) heart failure  Active Problems:    Acute congestive heart failure    COPD (chronic obstructive pulmonary disease)    PAD (peripheral artery disease)    CKD (chronic kidney disease) stage 3, GFR 30-59 ml/min    Diabetes mellitus with chronic kidney disease    Dementia    Cellulitis    Leg pain    1. Acute and chronic diastolic congestive heart failure. Improving. Legs without edema but still with body wall edema. Still on bumex, now on po    2. Erythema both shins, procal remains normal. Shin pain is better    3. Acute kidney injury/chronic kidney disease stage III. Creatinine is stable    4. Diabetes mellitus type 2. Glucoses are good, monitor    5. Dementia. Continue medications.    6. Anemia. Hemoglobin 9.3 in 2014.  Probably secondary to chronic kidney disease. TSH normal. MCV is normal but RDW is high. Iron levels ok, %saturation is low,  b12 is ok     7. Leg pain- try cream, add neurontin     8. Pneumonia- temp is up slightly, procal minimally higher    Emiliano Patel MD  02/12/17  11:25 AM

## 2017-02-12 NOTE — PLAN OF CARE
Problem: Patient Care Overview (Adult)  Goal: Plan of Care Review  Outcome: Ongoing (interventions implemented as appropriate)    02/12/17 0533   Coping/Psychosocial Response Interventions   Plan Of Care Reviewed With patient   Patient Care Overview   Progress improving   Outcome Evaluation   Outcome Summary/Follow up Plan CONTINUE TO MONITOR VS, LABS, PAIN, GAVE GABAPENTIN       Goal: Adult Individualization and Mutuality  Outcome: Ongoing (interventions implemented as appropriate)    02/12/17 0533   Individualization   Patient Specific Interventions CONTINUE TO MONITOR SKIN         Problem: Infection, Risk/Actual (Adult)  Goal: Infection Prevention/Resolution  Outcome: Ongoing (interventions implemented as appropriate)    02/12/17 0533   Infection, Risk/Actual (Adult)   Infection Prevention/Resolution making progress toward outcome         Problem: Cardiac: Heart Failure (Adult)  Goal: Signs and Symptoms of Listed Potential Problems Will be Absent or Manageable (Cardiac: Heart Failure)  Outcome: Ongoing (interventions implemented as appropriate)    02/12/17 0533   Cardiac: Heart Failure   Problems Assessed (Heart Failure) all   Problems Present (Heart Failure) cardiac pump dysfunction;decreased quality of life

## 2017-02-12 NOTE — PROGRESS NOTES
"Acute Care - Physical Therapy Treatment Note  [facility]     Patient Name: Halley Becerra  : 1931  MRN: [id number]  Today's Date: 2017  Onset of Illness/Injury or Date of Surgery Date: 17  Date of Referral to PT: 17  Referring Physician: Ellen Chairez    Admit Date: 2017    Visit Dx:    ICD-10-CM ICD-9-CM   1. Acute congestive heart failure, unspecified congestive heart failure type I50.9 428.0   2. Pneumonia of both lungs due to infectious organism, unspecified part of lung J18.9 483.8   3. Cellulitis of lower extremity, unspecified laterality L03.119 682.6   4. Muscle weakness (generalized) M62.81 728.87     Patient Active Problem List   Diagnosis   • Acute congestive heart failure   • COPD (chronic obstructive pulmonary disease)   • PAD (peripheral artery disease)   • CKD (chronic kidney disease) stage 3, GFR 30-59 ml/min   • Diabetes mellitus with chronic kidney disease   • Dementia   • Cellulitis   • Leg pain   • Acute on chronic diastolic (congestive) heart failure               Adult Rehabilitation Note       02/10/17 0928 17 1500       Rehab Assessment/Intervention    Discipline physical therapist  -MS physical therapy assistant  -CW     Document Type therapy note (daily note)  -MS therapy note (daily note)  -CW     Subjective Information agree to therapy;complains of;fatigue;pain  -MS agree to therapy;complains of;weakness;pain  -CW     Patient Effort, Rehab Treatment adequate  -MS adequate  -CW     Symptoms Noted Comment Pt. reports feeling \"better\" but still has c/o pain in her bilateral L.E.'s and abdomen.  Pt. reports pain only with mobility and when her L.E.'s are touched. Pt. reports she is eager to get back to her SNF.  -MS      Precautions/Limitations fall precautions   Dec. skin integrity; Contact isolation; exit alarm  -MS fall precautions  -CW     Specific Treatment Considerations Precautions taken to help minimize/eliminate all friction/shearing forces " "to pt.'s skin during mobility.  -MS      Recorded by [MS] Ari Chairez, PT [CW] Brett Bennett     Pain Assessment    Pain Assessment 0-10  -MS 0-10  -CW     Pain Score 5  -MS 10  -CW     Post Pain Score 5  -MS      Pain Location --   Abdomen; L.E.'s  -MS Abdomen  -CW     Pain Orientation Right;Left  -MS Right;Left  -CW     Pain Descriptors --   Pt. reports she \"doesnt know\" how to describe her pain.  -MS      Pain Intervention(s) Repositioned;Elevated;Rest  -MS      Recorded by [MS] Ari Chairez, PT [CW] Brett Bennett     Cognitive Assessment/Intervention    Current Cognitive/Communication Assessment functional  -MS functional  -CW     Orientation Status oriented x 4  -MS oriented x 4  -CW     Follows Commands/Answers Questions 100% of the time  -% of the time  -CW     Personal Safety WNL/WFL  -MS WNL/WFL  -CW     Personal Safety Interventions fall prevention program maintained;gait belt;nonskid shoes/slippers when out of bed;supervised activity  -MS fall prevention program maintained;gait belt;nonskid shoes/slippers when out of bed  -CW     Recorded by [MS] Ari Chairez, PT [CW] Brett Bennett     Bed Mobility, Assessment/Treatment    Bed Mobility, Scoot/Bridge, Menard  dependent (less than 25% patient effort);2 person assist required  -CW     Bed Mob, Supine to Sit, Menard moderate assist (50% patient effort);2 person assist required  -MS moderate assist (50% patient effort);2 person assist required  -CW     Bed Mob, Sit to Supine, [place]ard moderate assist (50% patient effort);2 person assist required  -MS moderate assist (50% patient effort);2 person assist required  -CW     Recorded by [MS] Ari Chairez, PT [CW] Brett Bennett     Transfer Assessment/Treatment    Transfers, Sit-Stand [place] moderate assist (50% patient effort);2 person assist required  -MS maximum assist (25% patient effort);2 person assist required;hand held assist  -CW     " Transfers, Stand-Sit [place] moderate assist (50% patient effort);2 person assist required  -MS maximum assist (25% patient effort);2 person assist required;hand held assist  -CW     Transfers, Sit-Stand-Sit, Assist Device rolling walker  -MS rolling walker  -CW     Transfer, Comment Performed x 2 for functional strength training.  -MS      Recorded by [MS] Ari Chairez, PT [CW] Brett Bennett     Gait Assessment/Treatment    Gait, Gaston Level moderate assist (50% patient effort);2 person assist required  -MS --  -CW     Gait, Assistive Device rolling walker  -MS --  -CW     Gait, Distance (Feet) 5   Forward/backward  -MS      Gait, Gait Deviations right:;left:;antalgic;marco a decreased;forward flexed posture;limb motion velocity decreased;step length decreased  -MS      Gait, Safety Issues sequencing ability decreased;step length decreased;weight-shifting ability decreased  -MS      Gait, Comment Pt. also able to take 3-4 sidesteps up toward the head of the bed.  Pt. requires mod. verbal/tactile cues for posture correction and RWX guidance.  Fatigues quickly with all upright mobility.  -MS      Recorded by [MS] Ari Chairez, PT [CW] Brett Bennett     Balance Skills Training    Sitting-Level of Assistance Contact guard  -MS      Sitting-Balance Support Feet supported;Right upper extremity supported;Left upper extremity supported  -MS      Recorded by [MS] Ari Chairez, CHAO      Therapy Exercises    Bilateral Lower Extremities AROM:;10 reps;sitting;ankle pumps/circles;hip flexion;LAQ  -MS AROM:;ankle pumps/circles;15 reps;hip flexion;LAQ  -CW     Recorded by [MS] Ari Chairez, PT [CW] Brett Bennett     Positioning and Restraints    Pre-Treatment Position in bed  -MS in bed  -CW     Post Treatment Position bed  -MS bed  -CW     In Bed notified nsg;supine;call light within reach;encouraged to call for assist;exit alarm on;with family/caregiver;RLE elevated;LLE elevated;R heel  elevated;L heel elevated   All lines intact. V.S.S.  -MS supine;call light within reach;encouraged to call for assist;exit alarm on  -CW     Recorded by [MS] Ari DEMPSEY Mihaela, PT [CW] Brett Bennett       User Key  (r) = Recorded By, (t) = Taken By, (c) = Cosigned By    Initials Name Effective Dates    MS Ari KE Chairez, PT 12/01/15 -     CW Brett Bennett 12/13/16 -                 IP PT Goals       02/06/17 1158          Bed Mobility PT LTG    Bed Mobility PT LTG, Date Established 02/06/17  -MS      Bed Mobility PT LTG, Time to Achieve 5 - 7 days  -MS      Bed Mobility PT LTG, Activity Type all bed mobility  -MS      Bed Mobility PT LTG, [place] Level minimum assist (75% patient effort);2 person assist required  -MS      Transfer Training PT LTG    Transfer Training PT LTG, Date Established 02/06/17  -MS      Transfer Training PT LTG, Time to Achieve 5 - 7 days  -MS      Transfer Training PT LTG, Activity Type sit to stand/stand to sit  -MS      Transfer Training PT LTG, [place] Level minimum assist (75% patient effort);2 person assist required  -MS      Transfer Training PT LTG, Assist Device walker, rolling  -MS      Transfer Training 2 PT LTG    Transfer Training PT 2 LTG, Date Established 02/06/17  -MS      Transfer Training PT 2 LTG, Time to Achieve 5 - 7 days  -MS      Transfer Training PT 2 LTG, Activity Type bed to chair /chair to bed  -MS      Transfer Training PT 2 LTG, [place] Level minimum assist (75% patient effort);2 person assist required  -MS      Transfer Training PT 2 LTG, Assist Device walker, rolling  -MS      Gait Training PT LTG    Gait Training Goal PT LTG, Date Established 02/06/17  -MS      Gait Training Goal PT LTG, Time to Achieve 5 - 7 days  -MS      Gait Training Goal PT LTG, [place] Level minimum assist (75% patient effort);2 person assist required  -MS      Gait Training Goal PT LTG, Assist Device walker, rolling  -MS      Gait Training Goal PT LTG,  Distance to Achieve 30 feet  -MS        User Key  (r) = Recorded By, (t) = Taken By, (c) = Cosigned By    Initials Name Provider Type    MS Ari Chairez, PT Physical Therapist          Physical Therapy Education     Title: PT OT SLP Therapies (Done)     Topic: Physical Therapy (Done)     Point: Mobility training (Done)    Learning Progress Summary    Learner Readiness Method Response Comment Documented by Status   Patient Acceptance E,D VU,NR  MS 02/10/17 0934 Done    Acceptance E,TB DU,VU   02/09/17 1554 Done    Acceptance E,TB,D DU,VU   02/08/17 0943 Done    Acceptance D,E NR,VU  MS 02/06/17 1158 Done               Point: Home exercise program (Done)    Learning Progress Summary    Learner Readiness Method Response Comment Documented by Status   Patient Acceptance E,D VU,NR  MS 02/10/17 0934 Done    Acceptance E,TB DU,VU   02/09/17 1554 Done    Acceptance E,TB,D DU,VU   02/08/17 0943 Done    Acceptance D,E NR,VU  MS 02/06/17 1158 Done               Point: Body mechanics (Done)    Learning Progress Summary    Learner Readiness Method Response Comment Documented by Status   Patient Acceptance E,D VU,NR  MS 02/10/17 0934 Done    Acceptance E,TB DU,VU   02/09/17 1554 Done    Acceptance E,TB,D DU,VU   02/08/17 0943 Done    Acceptance D,E NR,VU  MS 02/06/17 1158 Done               Point: Precautions (Done)    Learning Progress Summary    Learner Readiness Method Response Comment Documented by Status   Patient Acceptance E,D VU,NR  MS 02/10/17 0934 Done    Acceptance E,TB DU,VU   02/09/17 1554 Done    Acceptance E,TB,D DU,VU   02/08/17 0943 Done    Acceptance D,E NR,VU  MS 02/06/17 1158 Done                      User Key     Initials Effective Dates Name Provider Type Discipline    MS 12/01/15 -  Ari Chairez, PT Physical Therapist PT     12/13/16 -  Brett Bennett Physical Therapy Assistant PT                    PT Recommendation and Plan  Anticipated Discharge Disposition: skilled nursing  facility  Planned Therapy Interventions: balance training, bed mobility training, gait training, patient/family education, postural re-education, ROM (Range of Motion), strengthening, transfer training  PT Frequency: daily             Outcome Measures       02/12/17 1400 02/10/17 0900 02/09/17 1500    How much help from another person do you currently need...    Turning from your back to your side while in flat bed without using bedrails? 2  -ZK 2  -MS 2  -CW    Moving from lying on back to sitting on the side of a flat bed without bedrails? 2  -ZK 2  -MS 2  -CW    Moving to and from a bed to a chair (including a wheelchair)? 2  -ZK 2  -MS 2  -CW    Standing up from a chair using your arms (e.g., wheelchair, bedside chair)? 2  -ZK 2  -MS 2  -CW    Climbing 3-5 steps with a railing? 1  -ZK 1  -MS 1  -CW    To walk in hospital room? 3  -ZK 2  -MS 2  -CW    AM-PAC 6 Clicks Score 12  -ZK 11  -MS 11  -CW    Functional Assessment    Outcome Measure Options AM-PAC 6 Clicks Basic Mobility (PT)  -ZK AM-PAC 6 Clicks Basic Mobility (PT)  -MS AM-PAC 6 Clicks Basic Mobility (PT)  -CW      User Key  (r) = Recorded By, (t) = Taken By, (c) = Cosigned By    Initials Name Provider Type    ZK Zorre Zeno Kimura, PT Physical Therapist    MS Ari Chairez, PT Physical Therapist    CW Brett Bennett Physical Therapy Assistant           Time Calculation:         PT Charges       02/12/17 1454          Time Calculation    Start Time 1430  -ZK      Stop Time 1450  -ZK      Time Calculation (min) 20 min  -ZK      PT Received On 02/12/17  -ZK      PT - Next Appointment 02/13/17  -ZK        User Key  (r) = Recorded By, (t) = Taken By, (c) = Cosigned By    Initials Name Provider Type    ZK Zorre Zeno Kimura, PT Physical Therapist          Therapy Charges for Today     Code Description Service Date Service Provider Modifiers Qty    45675489635 HC GAIT TRAINING EA 15 MIN 2/12/2017 Zorre Zeno Kimura, PT GP 1    24691652159 HC PT THER SUPP EA  15 MIN 2/12/2017 Zorre Zeno Kimura, PT GP 1          PT G-Codes  Outcome Measure Options: AM-PAC 6 Clicks Basic Mobility (PT)    Zorre Zeno Kimura, PT  2/12/2017

## 2017-02-12 NOTE — CONSULTS
Group: [facility]         PULMONARY CONSULT NOTE    Patient Identification:  Halley Becerra  85 y.o.  female  11/11/1931  [id number]            Requesting physician: Dr. Emiliano Patel    Reason for Consultation:  Abnormal chest x-ray    CC: Leg pain and swelling, abnormal chest x-ray    History of Present Illness:  85-year-old female who presents with leg pain and swelling.  She has been in the hospital for several days, receiving diuretics and overall slowly improving.  She does have a history of COPD and smoker.  She was an active smoker prior to admission.  She also has a history of MRSA pneumonia.  She denies any worsening in her shortness of breath, and she is on her usual oxygen flow of 2-3 L via nasal cannula.  Chest x-ray today was obtained and we were consulted because of worsening.  I visualized the images.  Chest x-ray shows some increased scattered airspace disease bilaterally, worse compared to the one performed 5 days ago.  Patient denies fever or chills.  She does have a cough.  The cough is mildly productive of scant yellow sputum, happens every day.    I reviewed history and physical dictated by Dr. eFrmín Lagunas on February 50,017.    Review of Systems   Constitutional: Positive for fatigue. Negative for diaphoresis and fever.   HENT: Negative for ear discharge and sore throat.    Eyes: Negative for pain and visual disturbance.   Respiratory: Positive for cough and shortness of breath.    Cardiovascular: Positive for palpitations and leg swelling. Negative for chest pain.   Gastrointestinal: Negative for abdominal pain and diarrhea.   Endocrine: Negative for cold intolerance and polyuria.   Genitourinary: Negative for dysuria and hematuria.   Musculoskeletal: Negative for joint swelling and myalgias.   Skin: Negative for rash and wound.   Neurological: Positive for dizziness and weakness. Negative for speech difficulty and numbness.   Hematological: Negative for adenopathy. Does not  bruise/bleed easily.   Psychiatric/Behavioral: Negative for agitation and confusion.       Past Medical History:  Past Medical History   Diagnosis Date   • Anemia    • Bronchitis    • CHF (congestive heart failure)    • CKD (chronic kidney disease)    • COPD (chronic obstructive pulmonary disease)    • Dementia    • Diabetes mellitus    • Dysphagia    • E. coli UTI (urinary tract infection)    • Humerus fracture    • Hyperlipidemia    • Hypertension    • Hyponatremia    • Memory changes    • MRSA infection    • Onychomycosis    • PAD (peripheral artery disease)    • Pneumonia    • Renal insufficiency    • Sepsis        Past Surgical History:  Past Surgical History   Procedure Laterality Date   • Coronary artery bypass graft          Home Meds:  Prescriptions Prior to Admission   Medication Sig Dispense Refill Last Dose   • acetaminophen (TYLENOL) 325 MG tablet Take 650 mg by mouth Every 6 (Six) Hours As Needed for mild pain (1-3).   Past Week at Unknown time   • aspirin 81 MG chewable tablet Chew 81 mg Daily.   2/5/2017 at Unknown time   • bumetanide (BUMEX) 2 MG tablet Take 2 mg by mouth 2 (Two) Times a Day.   2/5/2017 at Unknown time   • carvedilol (COREG) 12.5 MG tablet Take 12.5 mg by mouth 2 (Two) Times a Day With Meals.   2/5/2017 at Unknown time   • donepezil (ARICEPT) 5 MG tablet Take 5 mg by mouth Every Night.   2/4/2017 at Unknown time   • fluocinonide (LIDEX) 0.05 % cream Apply  topically 2 (Two) Times a Day. To affected areas i.e. psoriasis   2/5/2017 at Unknown time   • fluticasone-salmeterol (ADVAIR DISKUS) 250-50 MCG/DOSE DISKUS Inhale 1 puff 2 (Two) Times a Day.   2/5/2017 at Unknown time   • insulin aspart (novoLOG) 100 UNIT/ML injection Inject 5 Units under the skin 3 (Three) Times a Day Before Meals. And   Sliding scale   2/5/2017 at Unknown time   • insulin aspart (novoLOG) 100 UNIT/ML injection Inject 5 Units under the skin 3 (Three) Times a Day Before Meals.   2/5/2017 at Unknown time   •  "insulin detemir (LEVEMIR) 100 UNIT/ML injection Inject 30 Units under the skin Daily. With breakfast   2/5/2017 at Unknown time   • lisinopril (PRINIVIL,ZESTRIL) 5 MG tablet Take 5 mg by mouth Daily.   2/5/2017 at Unknown time   • miconazole (MICOTIN) 2 % cream Apply 1 application topically 2 (Two) Times a Day. To buttocks   2/5/2017 at Unknown time   • miconazole (MICOTIN) 2 % cream Apply 1 application topically As Needed (apply to buttocks after each incontinence episode).   2/5/2017 at Unknown time   • Multiple Vitamins-Minerals (MULTIVITAMIN ADULT PO) Take  by mouth Daily.   2/5/2017 at Unknown time   • nystatin (MYCOSTATIN) 791613 UNIT/GM powder Apply 1 application topically 2 (Two) Times a Day. To skin folds   2/5/2017 at Unknown time   • potassium chloride (K-DUR,KLOR-CON) 10 MEQ CR tablet Take 10 mEq by mouth Daily.   2/5/2017 at Unknown time       Allergies:  Allergies   Allergen Reactions   • Sulfa Antibiotics        Social History:   Social History     Social History   • Marital status:      Spouse name: N/A   • Number of children: N/A   • Years of education: N/A     Occupational History   • Not on file.     Social History Main Topics   • Smoking status: Former Smoker   • Smokeless tobacco: Not on file   • Alcohol use No   • Drug use: Not on file   • Sexual activity: Not on file     Other Topics Concern   • Not on file     Social History Narrative   • No narrative on file       Family History:  Negative for lung disease    Physical Exam:  Visit Vitals   • /48 (BP Location: Left arm, Patient Position: Lying)   • Pulse 68   • Temp 98 °F (36.7 °C) (Oral)   • Resp 20   • Ht 62\" (157.5 cm)   • Wt 210 lb 8 oz (95.5 kg)   • SpO2 94%   • BMI 38.5 kg/m2    Body mass index is 38.5 kg/(m^2). 94% 210 lb 8 oz (95.5 kg)  Physical Exam   Constitutional: She appears well-developed. No distress.   HENT:   Head: Normocephalic.   Mouth/Throat: Oropharynx is clear and moist.   Eyes: Conjunctivae and EOM are " normal. No scleral icterus.   Neck: No tracheal deviation present. No thyromegaly present.   Cardiovascular:   Irregularly irregular rhythm.  1+ ankle edema bilaterally   Pulmonary/Chest: No respiratory distress. She exhibits no tenderness.   A few scattered coarse breath sounds.  Some diminished breath sounds over the bases bilaterally.  No actual wheezing   Abdominal: She exhibits no distension and no mass. There is no tenderness.   Musculoskeletal: Normal range of motion. She exhibits no deformity.   Neurological: She is alert. No cranial nerve deficit. She exhibits normal muscle tone.   Skin: Skin is warm. No rash noted. No erythema.   Psychiatric: She has a normal mood and affect. Thought content normal.       Lab Review:   LABS:  Lab Results   Component Value Date    CALCIUM 9.8 02/12/2017    PHOS 3.4 07/09/2014     Results from last 7 days  Lab Units 02/12/17  0519 02/10/17  0510 02/09/17  0539 02/09/17  0113 02/08/17  0407  02/05/17  1710   MAGNESIUM mg/dL  --   --   --  2.1  --   --   --    SODIUM mmol/L 146* 144 141  --  140  < > 140   POTASSIUM mmol/L 3.6 3.7 3.8 4.1 4.2  < > 5.0   CHLORIDE mmol/L 101 102 101  --  101  < > 102   TOTAL CO2 mmol/L 30.9* 30.5* 26.6  --  25.7  < > 22.6   BUN mg/dL 44* 45* 45*  --  49*  < > 52*   CREATININE mg/dL 1.64* 1.50* 1.51*  --  1.62*  < > 1.99*   GLUCOSE mg/dL 132* 96 107*  --  121*  < > 115*   CALCIUM mg/dL 9.8 9.6 9.6  --  9.7  < > 9.1   WBC 10*3/mm3 7.15 6.25  --   --  5.62  < > 6.41   HEMOGLOBIN g/dL 9.9* 9.8*  --   --  10.0*  < > 10.6*   PLATELETS 10*3/mm3 185 172  --   --  179  < > 183   ALT (SGPT) U/L  --  10  --   --   --   --  13   AST (SGOT) U/L  --  16  --   --   --   --  18   < > = values in this interval not displayed.      Lab Results   Component Value Date    CKTOTAL 32 02/05/2017    TROPONINT <0.010 02/05/2017     Lab Results   Component Value Date    TSH 3.100 02/06/2017     Estimated Creatinine Clearance: 27 mL/min (by C-G formula based on Cr of  1.64).     Imaging: I personally visualized the images of scans/x-rays performed within last 3 days.  There are increased bilateral scattered fluffy airspace infiltrates, compared to the film obtained several days ago.    Assessment:  Acute on chronic diastolic (congestive) heart failure  Pulmonary infiltrates  Pulmonary edema  Fluid overload  COPD, stable  Chronic respiratory failure, stable  Acute on chronic kidney injury    Recommendations:  I would hold off on any antibiotics at this time.  Clinically she does not look sick or septic.  Her pro-calcitonin is normal.  Her white count is normal.  She does not have any fever.  I would perform respiratory cultures for bacteria and viruses prior to deciding on any antibiotic therapy.  Continue bronchodilators, pulmonary hygiene measures such as ambulation and flutter valve, as well as oxygen.  We will follow along with you.      Werner Reddy MD  2/12/2017  1:12 PM    EMR Dragon/Transcription disclaimer:     Much of this encounter note is an electronic transcription/translation of spoken language to printed text. The electronic translation of spoken language may permit erroneous, or at times, nonsensical words or phrases to be inadvertently transcribed; Although I have reviewed the note for such errors, some may still exist.

## 2017-02-12 NOTE — PLAN OF CARE
Problem: Patient Care Overview (Adult)  Goal: Plan of Care Review  Outcome: Ongoing (interventions implemented as appropriate)    02/12/17 1731   Coping/Psychosocial Response Interventions   Plan Of Care Reviewed With patient   Patient Care Overview   Progress improving   Outcome Evaluation   Outcome Summary/Follow up Plan Cummings catheter placed for urinary retention, difficult placement so left to bsd per md order. Resp panel, resp culture sent this shift; Pulmonary to see; safety maintained. Continue to monitor.       Goal: Adult Individualization and Mutuality  Outcome: Ongoing (interventions implemented as appropriate)  Goal: Discharge Needs Assessment  Outcome: Ongoing (interventions implemented as appropriate)    Problem: Infection, Risk/Actual (Adult)  Goal: Infection Prevention/Resolution  Outcome: Outcome(s) achieved Date Met:  02/12/17    Problem: Cardiac: Heart Failure (Adult)  Goal: Signs and Symptoms of Listed Potential Problems Will be Absent or Manageable (Cardiac: Heart Failure)  Outcome: Ongoing (interventions implemented as appropriate)    Problem: Urine Elimination, Impaired (Adult)  Goal: Reduced Incontinence Episodes  Outcome: Ongoing (interventions implemented as appropriate)

## 2017-02-12 NOTE — PROGRESS NOTES
"    Patient Name: Halley Becerra  :1931  85 y.o.      Patient Care Team:  Jono Verde Jr., MD as PCP - General (Geriatric Medicine)    Chief Complaint: CHF    Interval History: She is off bumex drip.  Getting intermittent IV boluses.  Her leg edema is substantially improved.     Objective   Vital Signs  Temp:  [98 °F (36.7 °C)-99.7 °F (37.6 °C)] 98.7 °F (37.1 °C)  Heart Rate:  [61-68] 61  Resp:  [20] 20  BP: ()/(39-51) 90/40    Intake/Output Summary (Last 24 hours) at 17 1409  Last data filed at 17 0837   Gross per 24 hour   Intake    570 ml   Output    400 ml   Net    170 ml     Flowsheet Rows         First Filed Value    Admission Height  62\" (157.5 cm) Documented at 2017 1613    Admission Weight  229 lb (104 kg) Documented at 2017 1613          Physical Exam:   General Appearance:    Alert, cooperative, in no acute distress   Lungs:     Clear to auscultation.  Normal respiratory effort and rate.      Heart:    Regular rhythm and normal rate, normal S1 and S2, no murmurs, gallops or rubs.     Chest Wall:    No abnormalities observed   Abdomen:     Soft, nontender, positive bowel sounds.     Extremities:   no cyanosis, clubbing or edema.  No marked joint deformities.  Adequate musculoskeletal strength.       Results Review:      Results from last 7 days  Lab Units 17  0519   SODIUM mmol/L 146*   POTASSIUM mmol/L 3.6   CHLORIDE mmol/L 101   TOTAL CO2 mmol/L 30.9*   BUN mg/dL 44*   CREATININE mg/dL 1.64*   GLUCOSE mg/dL 132*   CALCIUM mg/dL 9.8       Results from last 7 days  Lab Units 17  1710   CK TOTAL U/L 32   TROPONIN T ng/mL <0.010       Results from last 7 days  Lab Units 17  0519   WBC 10*3/mm3 7.15   HEMOGLOBIN g/dL 9.9*   HEMATOCRIT % 32.7*   PLATELETS 10*3/mm3 185           Results from last 7 days  Lab Units 17  0113   MAGNESIUM mg/dL 2.1                   Medication Review:     aspirin 81 mg Oral Daily   budesonide-formoterol 2 puff " Inhalation BID - RT   bumetanide 1 mg Oral BID   camphor-menthol  Topical BID   carvedilol 12.5 mg Oral BID With Meals   donepezil 5 mg Oral Nightly   enoxaparin 30 mg Subcutaneous Q24H   fluocinonide  Topical BID   gabapentin 100 mg Oral Q12H   insulin aspart 0-7 Units Subcutaneous 4x Daily AC & at Bedtime   insulin aspart 5 Units Subcutaneous TID AC   insulin detemir 28 Units Subcutaneous Nightly   lisinopril 5 mg Oral Daily   miconazole  Topical BID   nystatin  Topical BID   piperacillin-tazobactam 3.375 g Intravenous Q6H   potassium chloride 10 mEq Oral Daily             Assessment/Plan     Acute on chronic diastoic CHF.  Moderate AS.  She  responded well to IV bumex infusion.  On IV bumex intermittently now.  She wants to try to go home soon.      Brenton Stallworth MD  [facility]  02/12/17  2:09 PM

## 2017-02-13 PROBLEM — E87.0 HYPERNATREMIA: Status: ACTIVE | Noted: 2017-01-01

## 2017-02-13 NOTE — PLAN OF CARE
Problem: Patient Care Overview (Adult)  Goal: Plan of Care Review    02/13/17 0956   Coping/Psychosocial Response Interventions   Plan Of Care Reviewed With patient   Patient Care Overview   Progress improving   Outcome Evaluation   Outcome Summary/Follow up Plan Improved tolerance to functional activity this day with an increase in gait distance, continued ther. ex. program participation, and decreased assist required for overall functional mobility. P.T. Goals updated this date to reflect pt.'s progression in functional mobility.         Problem: Inpatient Physical Therapy  Goal: Bed Mobility Goal LTG- PT  Outcome: Revised    02/13/17 0956   Bed Mobility PT LTG   Bed Mobility PT LTG, Date Established 02/13/17   Bed Mobility PT LTG, Time to Achieve 5 - 7 days   Bed Mobility PT LTG, Activity Type all bed mobility   Bed Mobility PT LTG, [place] Level minimum assist (75% patient effort)       Goal: Transfer Training Goal 1 LTG- PT  Outcome: Revised    02/13/17 0956   Transfer Training PT LTG   Transfer Training PT LTG, Date Established 02/13/17   Transfer Training PT LTG, Time to Achieve 5 - 7 days   Transfer Training PT LTG, Activity Type sit to stand/stand to sit   Transfer Training PT LTG, [place] Level contact guard assist   Transfer Training PT LTG, Assist Device walker, rolling       Goal: Transfer Training Goal 2 LTG- PT  Outcome: Revised    02/13/17 0956   Transfer Training 2 PT LTG   Transfer Training PT 2 LTG, Date Established 02/13/17   Transfer Training PT 2 LTG, Time to Achieve 5 - 7 days   Transfer Training PT 2 LTG, Activity Type bed to chair /chair to bed   Transfer Training PT 2 LTG, [place] Level contact guard assist   Transfer Training PT 2 LTG, Assist Device walker, rolling       Goal: Gait Training Goal LTG- PT  Outcome: Revised    02/13/17 0956   Gait Training PT LTG   Gait Training Goal PT LTG, Date Established 02/13/17   Gait Training Goal PT LTG, Time to Achieve 5 - 7 days    Gait Training Goal PT LTG, [place] Level contact guard assist   Gait Training Goal PT LTG, Assist Device walker, rolling   Gait Training Goal PT LTG, Distance to Achieve 80 feet

## 2017-02-13 NOTE — PLAN OF CARE
Problem: Patient Care Overview (Adult)  Goal: Plan of Care Review  Outcome: Ongoing (interventions implemented as appropriate)    02/13/17 0658   Coping/Psychosocial Response Interventions   Plan Of Care Reviewed With patient   Patient Care Overview   Progress improving   Outcome Evaluation   Outcome Summary/Follow up Plan RESP VIRAL PANEL NEGATIVE. MRSA SCREEN PENDING. VSS. NO C/O'S OR DISTRESS. SAFETY MAINTAINED         Problem: Cardiac: Heart Failure (Adult)  Goal: Signs and Symptoms of Listed Potential Problems Will be Absent or Manageable (Cardiac: Heart Failure)  Outcome: Ongoing (interventions implemented as appropriate)    Problem: Urine Elimination, Impaired (Adult)  Goal: Effective Containment of Urine  Outcome: Outcome(s) achieved Date Met:  02/13/17

## 2017-02-13 NOTE — PROGRESS NOTES
Continued Stay Note  [facility]     Patient Name: Halley Becerra  MRN: [id number]  Today's Date: 2/13/2017    Admit Date: 2/5/2017          Discharge Plan       02/13/17 1609    Case Management/Social Work Plan    Plan [facility]    Additional Comments Plan is to return to [facility]  [facility] to [facility]  Plan is for pt to retun Pt has bed to return to.  No other needs               Discharge Codes     None            Charlette Cage RN

## 2017-02-13 NOTE — PROGRESS NOTES
Dr. TERESO Reddy    [address]    2/13/2017    Patient ID:  Name:  Halley Becerra  MRN:  [id number]  11/11/1931  85 y.o.  female            CC/Reason for visit: Follow-up for abnormal chest x-ray    HPI: She has no new complaints.  She has shortness of breath only with exertion, but she did go for therapy today and she walked up and down the hallways on oxygen    Vitals:  Vitals:    02/13/17 0534 02/13/17 0717 02/13/17 1044 02/13/17 1201   BP:  122/52  107/57   BP Location:  Left arm  Left arm   Patient Position:  Lying  Lying   Pulse:  55 59 59   Resp:  18 18 18   Temp:  98.3 °F (36.8 °C)  98.4 °F (36.9 °C)   TempSrc:  Oral  Oral   SpO2:  95% 93% 95%   Weight: 210 lb 3.2 oz (95.3 kg)      Height:               Body mass index is 38.45 kg/(m^2).    Intake/Output Summary (Last 24 hours) at 02/13/17 1246  Last data filed at 02/13/17 0900   Gross per 24 hour   Intake    680 ml   Output   1475 ml   Net   -795 ml       Exam:  GEN:  No distress, appears stated age  EYES:   EOM-i, anicteric sclera bilat  ENT:    External ears/nose normal, OP clear  NECK:  Supple, midline trachea  LUNGS: A few coarse breath sounds bilaterally, but no wheezing, nonlabored breathing  CV:  Irregularly irregular  ABD:  Non tender, no enlarged liver or masses  EXT:  No cyanosis or clubbing    Scheduled meds:    aspirin 81 mg Oral Daily   budesonide-formoterol 2 puff Inhalation BID - RT   bumetanide 1 mg Oral BID   camphor-menthol  Topical BID   carvedilol 12.5 mg Oral BID With Meals   donepezil 5 mg Oral Nightly   enoxaparin 30 mg Subcutaneous Q24H   fluocinonide  Topical BID   gabapentin 100 mg Oral Q12H   insulin aspart 0-7 Units Subcutaneous 4x Daily AC & at Bedtime   insulin aspart 5 Units Subcutaneous TID AC   insulin detemir 28 Units Subcutaneous Nightly   lisinopril 5 mg Oral Daily   miconazole  Topical BID   nystatin  Topical BID   potassium chloride 10 mEq Oral Daily     IV meds:                           Data  Review:   I reviewed the patient's medications and new clinical results.  Lab Results   Component Value Date    CALCIUM 10.0 02/13/2017    PHOS 3.4 07/09/2014     Results from last 7 days  Lab Units 02/13/17  0810 02/12/17  0519 02/10/17  0510  02/09/17  0113   AST (SGOT) U/L 15  --  16  --   --    MAGNESIUM mg/dL  --   --   --   --  2.1   SODIUM mmol/L 148* 146* 144  < >  --    POTASSIUM mmol/L 3.9 3.6 3.7  < > 4.1   CHLORIDE mmol/L 103 101 102  < >  --    TOTAL CO2 mmol/L 34.4* 30.9* 30.5*  < >  --    BUN mg/dL 54* 44* 45*  < >  --    CREATININE mg/dL 1.74* 1.64* 1.50*  < >  --    GLUCOSE mg/dL 97 132* 96  < >  --    CALCIUM mg/dL 10.0 9.8 9.6  < >  --    WBC 10*3/mm3 6.28 7.15 6.25  --   --    HEMOGLOBIN g/dL 10.0* 9.9* 9.8*  --   --    PLATELETS 10*3/mm3 188 185 172  --   --    ALT (SGPT) U/L 8  --  10  --   --    < > = values in this interval not displayed.            ASSESSMENT:   Pneumonia    Acute on chronic diastolic (congestive) heart failure    Acute congestive heart failure  Chronic respiratory failure    COPD (chronic obstructive pulmonary disease)    PAD (peripheral artery disease)    CKD (chronic kidney disease) stage 3, GFR 30-59 ml/min    Diabetes mellitus with chronic kidney disease    Dementia    Cellulitis    Leg pain    Hypernatremia      PLAN:  The patient is not exhibiting any signs or symptoms of infection.  We will continue to monitor her closely while she remains off of antibiotics.    She has chronic respiratory failure and uses oxygen at home.  She does have a history of COPD which is stable.    Werner Reddy MD  2/13/2017

## 2017-02-13 NOTE — PROGRESS NOTES
"Acute Care - Physical Therapy Treatment Note  [facility]     Patient Name: Halley Becerra  : 1931  MRN: [id number]  Today's Date: 2017  Onset of Illness/Injury or Date of Surgery Date: 17  Date of Referral to PT: 17  Referring Physician: Ellen Chairez    Admit Date: 2017    Visit Dx:    ICD-10-CM ICD-9-CM   1. Acute congestive heart failure, unspecified congestive heart failure type I50.9 428.0   2. Pneumonia of both lungs due to infectious organism, unspecified part of lung J18.9 483.8   3. Cellulitis of lower extremity, unspecified laterality L03.119 682.6   4. Muscle weakness (generalized) M62.81 728.87     Patient Active Problem List   Diagnosis   • Acute congestive heart failure   • COPD (chronic obstructive pulmonary disease)   • PAD (peripheral artery disease)   • CKD (chronic kidney disease) stage 3, GFR 30-59 ml/min   • Diabetes mellitus with chronic kidney disease   • Dementia   • Cellulitis   • Leg pain   • Acute on chronic diastolic (congestive) heart failure   • Hypernatremia               Adult Rehabilitation Note       17 0952          Rehab Assessment/Intervention    Discipline physical therapist  -MS      Document Type therapy note (daily note)  -MS      Subjective Information agree to therapy;complains of;fatigue;pain  -MS      Patient Effort, Rehab Treatment good  -MS      Symptoms Noted Comment Pt. reports pain/tenderness in her Bilateral L.E.'s (Knee to toes) with touch only. Otherwise, pt. feeling \"better\" despite overall fatigue.  -MS      Precautions/Limitations fall precautions   Exit alarm; 2 Liters oxygen; Contact Isolation  -MS      Specific Treatment Considerations Precautions taken to minimize/eliminate all fiction/shearing forces to pt.'s skin during mobility.  -MS      Recorded by [MS] Ari Chairez, PT      Pain Assessment    Pain Assessment 0-10  -MS      Pain Score 3  -MS      Post Pain Score 3  -MS      Pain Location Leg  -MS      Pain " Orientation Right;Left  -MS      Pain Frequency Intermittent   Pain only when her L.E.'s are touched.  -MS      Recorded by [MS] Ari Chairez, PT      Cognitive Assessment/Intervention    Current Cognitive/Communication Assessment functional  -MS      Orientation Status oriented x 4  -MS      Follows Commands/Answers Questions 100% of the time  -MS      Personal Safety WNL/WFL  -MS      Personal Safety Interventions fall prevention program maintained;gait belt;nonskid shoes/slippers when out of bed;supervised activity  -MS      Recorded by [MS] Ari Chairez, PT      Bed Mobility, Assessment/Treatment    Bed Mob, Supine to Sit, [place] minimum assist (75% patient effort);2 person assist required  -MS      Bed Mob, Sit to Supine, [place] moderate assist (50% patient effort);2 person assist required  -MS      Recorded by [MS] Ari Chairez PT      Transfer Assessment/Treatment    Transfers, Sit-Stand [place] minimum assist (75% patient effort)  -MS      Transfers, Stand-Sit [place] minimum assist (75% patient effort)  -MS      Transfers, Sit-Stand-Sit, Assist Device rolling walker  -MS      Recorded by [MS] Ari Chairez, PT      Gait Assessment/Treatment    Gait, [place] Level contact guard assist;2 person assist required  -MS      Gait, Assistive Device rolling walker  -MS      Gait, Distance (Feet) 45  -MS      Gait, Gait Deviations marco a decreased;forward flexed posture;limb motion velocity decreased;step length decreased  -MS      Gait, Safety Issues balance decreased during turns;step length decreased;supplemental O2   Pt. on 2 liters oxygen.  -MS      Gait, Comment Min. verbal/tactile cues for posture correction and RWX guidance.  -MS      Recorded by [MS] Ari Chairez, PT      Balance Skills Training    Sitting-Level of Assistance Close supervision  -MS      Sitting-Balance Support Feet supported;Right upper extremity supported;Left upper extremity supported  -MS       Recorded by [MS] Ari Chairez, PT      Therapy Exercises    Bilateral Lower Extremities AROM:;10 reps;sitting;ankle pumps/circles;hip flexion;LAQ  -MS      Recorded by [MS] Air Chairez PT      Positioning and Restraints    Pre-Treatment Position in bed  -MS      Post Treatment Position bed  -MS      In Bed notified nsg;supine;call light within reach;encouraged to call for assist;exit alarm on;RLE elevated;LLE elevated   All lines intact. V.S.S.  -MS      Recorded by [MS] Ari Chairez PT        User Key  (r) = Recorded By, (t) = Taken By, (c) = Cosigned By    Initials Name Effective Dates    MS Ari Chairez, PT 12/01/15 -                 IP PT Goals       02/13/17 0956 02/06/17 1158       Bed Mobility PT LTG    Bed Mobility PT LTG, Date Established 02/13/17  -MS 02/06/17  -MS     Bed Mobility PT LTG, Time to Achieve 5 - 7 days  -MS 5 - 7 days  -MS     Bed Mobility PT LTG, Activity Type all bed mobility  -MS all bed mobility  -MS     Bed Mobility PT LTG, [place] Level minimum assist (75% patient effort)  -MS minimum assist (75% patient effort);2 person assist required  -MS     Transfer Training PT LTG    Transfer Training PT LTG, Date Established 02/13/17  -MS 02/06/17  -MS     Transfer Training PT LTG, Time to Achieve 5 - 7 days  -MS 5 - 7 days  -MS     Transfer Training PT LTG, Activity Type sit to stand/stand to sit  -MS sit to stand/stand to sit  -MS     Transfer Training PT LTG, [place] Level contact guard assist  -MS minimum assist (75% patient effort);2 person assist required  -MS     Transfer Training PT LTG, Assist Device walker, rolling  -MS walker, rolling  -MS     Transfer Training 2 PT LTG    Transfer Training PT 2 LTG, Date Established 02/13/17  -MS 02/06/17  -MS     Transfer Training PT 2 LTG, Time to Achieve 5 - 7 days  -MS 5 - 7 days  -MS     Transfer Training PT 2 LTG, Activity Type bed to chair /chair to bed  -MS bed to chair /chair to bed  -MS     Transfer Training  PT 2 LTG, [place] Level contact guard assist  -MS minimum assist (75% patient effort);2 person assist required  -MS     Transfer Training PT 2 LTG, Assist Device walker, rolling  -MS walker, rolling  -MS     Gait Training PT LTG    Gait Training Goal PT LTG, Date Established 02/13/17  -MS 02/06/17  -MS     Gait Training Goal PT LTG, Time to Achieve 5 - 7 days  -MS 5 - 7 days  -MS     Gait Training Goal PT LTG, [place] Level contact guard assist  -MS minimum assist (75% patient effort);2 person assist required  -MS     Gait Training Goal PT LTG, Assist Device walker, rolling  -MS walker, rolling  -MS     Gait Training Goal PT LTG, Distance to Achieve 80 feet  -MS 30 feet  -MS       User Key  (r) = Recorded By, (t) = Taken By, (c) = Cosigned By    Initials Name Provider Type    MS Ari DEMPSEY Mihaela, PT Physical Therapist          Physical Therapy Education     Title: PT OT SLP Therapies (Done)     Topic: Physical Therapy (Done)     Point: Mobility training (Done)    Learning Progress Summary    Learner Readiness Method Response Comment Documented by Status   Patient Acceptance E,D VU,NR  MS 02/13/17 0956 Done    Acceptance E,D VU,NR  MS 02/10/17 0934 Done    Acceptance E,TB DU,VU   02/09/17 1554 Done    Acceptance E,TB,D DU,VU   02/08/17 0943 Done    Acceptance D,E NR,VU  MS 02/06/17 1158 Done               Point: Home exercise program (Done)    Learning Progress Summary    Learner Readiness Method Response Comment Documented by Status   Patient Acceptance E,D VU,NR  MS 02/13/17 0956 Done    Acceptance E,D VU,NR  MS 02/10/17 0934 Done    Acceptance E,TB DU,VU   02/09/17 1554 Done    Acceptance E,TB,D DU,VU   02/08/17 0943 Done    Acceptance D,E NR,VU  MS 02/06/17 1158 Done               Point: Body mechanics (Done)    Learning Progress Summary    Learner Readiness Method Response Comment Documented by Status   Patient Acceptance E,D VU,NR  MS 02/13/17 0956 Done    Acceptance E,D VU,NR  MS 02/10/17  0934 Done    Acceptance E,TB DU,VU   02/09/17 1554 Done    Acceptance E,TB,D DU,VU  CW 02/08/17 0943 Done    Acceptance D,E NR,VU  MS 02/06/17 1158 Done               Point: Precautions (Done)    Learning Progress Summary    Learner Readiness Method Response Comment Documented by Status   Patient Acceptance E,D VU,NR  MS 02/13/17 0956 Done    Acceptance E,D VU,NR  MS 02/10/17 0934 Done    Acceptance E,TB DU,VU   02/09/17 1554 Done    Acceptance E,TB,D DU,VU   02/08/17 0943 Done    Acceptance D,E NR,VU  MS 02/06/17 1158 Done                      User Key     Initials Effective Dates Name Provider Type Discipline    MS 12/01/15 -  Ari Chairez, PT Physical Therapist PT     12/13/16 -  Brett Bennett Physical Therapy Assistant PT                    PT Recommendation and Plan  Anticipated Discharge Disposition: skilled nursing facility  Planned Therapy Interventions: balance training, bed mobility training, gait training, patient/family education, postural re-education, ROM (Range of Motion), strengthening, transfer training  PT Frequency: daily  Plan of Care Review  Plan Of Care Reviewed With: patient  Progress: improving  Outcome Summary/Follow up Plan: Improved tolerance to functional activity this day with an increase in gait distance, continued ther. ex. program participation, and decreased assist required for overall functional mobility.  P.T. Goals updated this date to reflect pt.'s progression in functional mobility.          Outcome Measures       02/13/17 0900 02/12/17 1400       How much help from another person do you currently need...    Turning from your back to your side while in flat bed without using bedrails? 3  -MS 2  -ZK     Moving from lying on back to sitting on the side of a flat bed without bedrails? 2  -MS 2  -ZK     Moving to and from a bed to a chair (including a wheelchair)? 3  -MS 2  -ZK     Standing up from a chair using your arms (e.g., wheelchair, bedside chair)? 3  -MS 2   -ZK     Climbing 3-5 steps with a railing? 2  -MS 1  -ZK     To walk in hospital room? 3  -MS 3  -ZK     AM-PAC 6 Clicks Score 16  -MS 12  -ZK     Functional Assessment    Outcome Measure Options AM-PAC 6 Clicks Basic Mobility (PT)  -MS AM-PAC 6 Clicks Basic Mobility (PT)  -ZK       User Key  (r) = Recorded By, (t) = Taken By, (c) = Cosigned By    Initials Name Provider Type    ZK Zorre Zeno Kimura, PT Physical Therapist    MS Ari Chairez, PT Physical Therapist           Time Calculation:         PT Charges       02/13/17 0959          Time Calculation    Start Time 0935  -MS      Stop Time 0950  -MS      Time Calculation (min) 15 min  -MS      PT Received On 02/13/17  -MS      PT - Next Appointment 02/14/17  -MS      PT Goal Re-Cert Due Date 02/20/17  -MS        User Key  (r) = Recorded By, (t) = Taken By, (c) = Cosigned By    Initials Name Provider Type    MS Ari Chairez, PT Physical Therapist          Therapy Charges for Today     Code Description Service Date Service Provider Modifiers Qty    21525780102 HC PT THER PROC EA 15 MIN 2/13/2017 Ari Chairez, PT GP 1    48025686802 HC PT THER SUPP EA 15 MIN 2/13/2017 Ari Chairez, PT GP 1          PT G-Codes  Outcome Measure Options: AM-PAC 6 Clicks Basic Mobility (PT)    Ari Chairez, PT  2/13/2017

## 2017-02-13 NOTE — PROGRESS NOTES
[facility]     LOS: 8 days     Subjective:  No cp  No soa  Eating some  No nausea and vomiting  Legs are much bettter  Walked yesterday at least 3-4 times per the patient    Objective:    Vital Signs:  Temp:  [98.3 °F (36.8 °C)-98.7 °F (37.1 °C)] 98.3 °F (36.8 °C)  Heart Rate:  [55-61] 55  Resp:  [14-20] 18  BP: ()/(40-52) 122/52    General Appearance: no acute distress, appears comfortable  Heart: regular rate and rhythm  Lungs: clear to auscultation bilaterally, respirations unlabored, good air entry  Abdomen: soft, non-tender, no guarding, no rebound, non-distended  Extremeties: trace edema, no cyanosis  Neurology: speech normal, mental status normal  Mental Status: alert, confused concerning my conversation with her  + body wall edema, about the same      Results Review:    GLUCOSE   Date Value Ref Range Status   02/13/2017 97 65 - 99 mg/dL Final   02/12/2017 132 (H) 65 - 99 mg/dL Final       Results from last 7 days  Lab Units 02/13/17  0810   WBC 10*3/mm3 6.28   HEMOGLOBIN g/dL 10.0*   HEMATOCRIT % 33.0*   PLATELETS 10*3/mm3 188       Results from last 7 days  Lab Units 02/13/17  0810   SODIUM mmol/L 148*   POTASSIUM mmol/L 3.9   CHLORIDE mmol/L 103   TOTAL CO2 mmol/L 34.4*   BUN mg/dL 54*   CREATININE mg/dL 1.74*   CALCIUM mg/dL 10.0   BILIRUBIN mg/dL 0.4   ALK PHOS U/L 71   ALT (SGPT) U/L 8   AST (SGOT) U/L 15   GLUCOSE mg/dL 97           Results from last 7 days  Lab Units 02/09/17  0113   MAGNESIUM mg/dL 2.1             I have reviewed daily medications and changes in CPOE    Scheduled meds    aspirin 81 mg Oral Daily   budesonide-formoterol 2 puff Inhalation BID - RT   bumetanide 1 mg Oral BID   camphor-menthol  Topical BID   carvedilol 12.5 mg Oral BID With Meals   donepezil 5 mg Oral Nightly   enoxaparin 30 mg Subcutaneous Q24H   fluocinonide  Topical BID   gabapentin 100 mg Oral Q12H   insulin aspart 0-7 Units Subcutaneous 4x Daily AC & at Bedtime   insulin aspart 5  Units Subcutaneous TID AC   insulin detemir 28 Units Subcutaneous Nightly   lisinopril 5 mg Oral Daily   miconazole  Topical BID   nystatin  Topical BID   potassium chloride 10 mEq Oral Daily          PRN meds  dextrose  •  dextrose  •  glucagon (human recombinant)  •  HYDROcodone-acetaminophen  •  miconazole  •  Insert peripheral IV **AND** sodium chloride    Assessment:    Principal Problem:    Acute on chronic diastolic (congestive) heart failure  Active Problems:    Acute congestive heart failure    COPD (chronic obstructive pulmonary disease)    PAD (peripheral artery disease)    CKD (chronic kidney disease) stage 3, GFR 30-59 ml/min    Diabetes mellitus with chronic kidney disease    Dementia    Cellulitis    Leg pain    1. Acute and chronic diastolic congestive heart failure. Improving. Legs without edema but still with body wall edema. Still on bumex, now on po    2. Erythema both shins, procal remains normal. Shin pain is better    3. Acute kidney injury/chronic kidney disease stage III. Creatinine is up slightly    4. Diabetes mellitus type 2. Glucoses are good, monitor    5. Dementia. Continue medications. Patient does not remember conversation from just 5 minutes ago that she is staying in hospital till tomorrow    6. Anemia.   Probably secondary to chronic kidney disease. monitor     7. Leg pain- much better     8. Concern for Pneumonia based on the xray but the patient is afebrile, wbc is normal, procal is still normal but rising. chest xray is better very quickly and just got one dose of zosyn.  She is currently off antibiotics, resp panel is negative, mrsa is negative    9. Hypernatremia- cut back on bumex    Dispo:  Maybe d/c tomorrow if sodium is ok, she remains afebrile, and oxygen sats are stable    Check bnp, cbc, procal in the am      Emiliano Patel MD  02/13/17  9:15 AM

## 2017-02-13 NOTE — PROGRESS NOTES
"    Patient Name: Halley Becerra  :1931  85 y.o.      Patient Care Team:  Jono Verde Jr., MD as PCP - General (Geriatric Medicine)    Chief Complaint: CHF    Interval History: She is off bumex drip.  Now on PO.  Her leg edema is substantially improved. She wants to go home.    Objective   Vital Signs  Temp:  [98.3 °F (36.8 °C)-98.7 °F (37.1 °C)] 98.4 °F (36.9 °C)  Heart Rate:  [55-61] 59  Resp:  [14-20] 18  BP: ()/(40-57) 107/57    Intake/Output Summary (Last 24 hours) at 17 1256  Last data filed at 17 0900   Gross per 24 hour   Intake    680 ml   Output   1475 ml   Net   -795 ml     Flowsheet Rows         First Filed Value    Admission Height  62\" (157.5 cm) Documented at 2017 1613    Admission Weight  229 lb (104 kg) Documented at 2017 1613          Physical Exam:   General Appearance:    Alert, cooperative, in no acute distress   Lungs:     Clear to auscultation.  Normal respiratory effort and rate.      Heart:    Regular rhythm and normal rate, normal S1 and S2, no murmurs, gallops or rubs.     Chest Wall:    No abnormalities observed   Abdomen:     Soft, nontender, positive bowel sounds.     Extremities:   no cyanosis, clubbing or edema.  No marked joint deformities.  Adequate musculoskeletal strength.       Results Review:      Results from last 7 days  Lab Units 17  1156   SODIUM mmol/L 146*   POTASSIUM mmol/L 3.8   CHLORIDE mmol/L 101   TOTAL CO2 mmol/L 31.3*   BUN mg/dL 53*   CREATININE mg/dL 1.85*   GLUCOSE mg/dL 165*   CALCIUM mg/dL 9.9           Results from last 7 days  Lab Units 17  0810   WBC 10*3/mm3 6.28   HEMOGLOBIN g/dL 10.0*   HEMATOCRIT % 33.0*   PLATELETS 10*3/mm3 188           Results from last 7 days  Lab Units 17  0113   MAGNESIUM mg/dL 2.1                   Medication Review:     aspirin 81 mg Oral Daily   budesonide-formoterol 2 puff Inhalation BID - RT   bumetanide 1 mg Oral BID   camphor-menthol  Topical BID   carvedilol 12.5 mg " Oral BID With Meals   donepezil 5 mg Oral Nightly   enoxaparin 30 mg Subcutaneous Q24H   fluocinonide  Topical BID   gabapentin 100 mg Oral Q12H   insulin aspart 0-7 Units Subcutaneous 4x Daily AC & at Bedtime   insulin aspart 5 Units Subcutaneous TID AC   insulin detemir 28 Units Subcutaneous Nightly   lisinopril 5 mg Oral Daily   miconazole  Topical BID   nystatin  Topical BID   potassium chloride 10 mEq Oral Daily             Assessment/Plan     Acute on chronic diastoic CHF.  Moderate AS.  She  responded well to IV bumex infusion.  On IV bumex intermittently now.  She wants to try to go home soon.    I think she is intravascularly dry, though total body she still has come fluid overload.  Her increased BUN/Cr suggests we should decrease bumex to 1 mg BID for a bit.  I think the rest of her diuresis can be performed as an outpatient.      Brenton Stallworth MD  [facility]  02/13/17  12:56 PM

## 2017-02-14 NOTE — PROGRESS NOTES
"Acute Care - Physical Therapy Treatment Note  [facility]     Patient Name: Halley Becerra  : 1931  MRN: [id number]  Today's Date: 2017  Onset of Illness/Injury or Date of Surgery Date: 17  Date of Referral to PT: 17  Referring Physician: Ellen Chairez    Admit Date: 2017    Visit Dx:    ICD-10-CM ICD-9-CM   1. Acute congestive heart failure, unspecified congestive heart failure type I50.9 428.0   2. Pneumonia of both lungs due to infectious organism, unspecified part of lung J18.9 483.8   3. Cellulitis of lower extremity, unspecified laterality L03.119 682.6   4. Muscle weakness (generalized) M62.81 728.87     Patient Active Problem List   Diagnosis   • Acute congestive heart failure   • COPD (chronic obstructive pulmonary disease)   • PAD (peripheral artery disease)   • CKD (chronic kidney disease) stage 3, GFR 30-59 ml/min   • Diabetes mellitus with chronic kidney disease   • Dementia   • Cellulitis   • Leg pain   • Acute on chronic diastolic (congestive) heart failure   • Hypernatremia               Adult Rehabilitation Note       17 1000 17 0952       Rehab Assessment/Intervention    Discipline physical therapy assistant  -CW physical therapist  -MS     Document Type therapy note (daily note)  -CW therapy note (daily note)  -MS     Subjective Information agree to therapy;complains of;fatigue;pain  -CW agree to therapy;complains of;fatigue;pain  -MS     Patient Effort, Rehab Treatment adequate  -CW good  -MS     Symptoms Noted Comment  Pt. reports pain/tenderness in her Bilateral L.E.'s (Knee to toes) with touch only. Otherwise, pt. feeling \"better\" despite overall fatigue.  -MS     Precautions/Limitations fall precautions;oxygen therapy device and L/min  -CW fall precautions   Exit alarm; 2 Liters oxygen; Contact Isolation  -MS     Specific Treatment Considerations  Precautions taken to minimize/eliminate all fiction/shearing forces to pt.'s skin during mobility.  " -MS     Recorded by [CW] Brett Bennett [MS] Ari Chairez, PT     Vital Signs    Post SpO2 (%) 95  -CW      O2 Delivery Post Treatment supplemental O2  -CW      Recorded by [CW] Brett Bennett      Pain Assessment    Pain Assessment 0-10  -CW 0-10  -MS     Pain Score 3  -CW 3  -MS     Post Pain Score  3  -MS     Pain Location Leg  -CW Leg  -MS     Pain Orientation Right;Left  -CW Right;Left  -MS     Pain Frequency  Intermittent   Pain only when her L.E.'s are touched.  -MS     Recorded by [CW] Brett Bennett [MS] Ari Chairez, PT     Cognitive Assessment/Intervention    Current Cognitive/Communication Assessment functional  -CW functional  -MS     Orientation Status oriented x 4  -CW oriented x 4  -MS     Follows Commands/Answers Questions 100% of the time  -% of the time  -MS     Personal Safety WNL/WFL  -CW WNL/WFL  -MS     Personal Safety Interventions fall prevention program maintained;gait belt;nonskid shoes/slippers when out of bed  -CW fall prevention program maintained;gait belt;nonskid shoes/slippers when out of bed;supervised activity  -MS     Recorded by [CW] Brett Bennett [MS] Ari Chairez, PT     Bed Mobility, Assessment/Treatment    Bed Mob, Supine to Sit, [place] minimum assist (75% patient effort);2 person assist required  -CW minimum assist (75% patient effort);2 person assist required  -MS     Bed Mob, Sit to Supine, [place] moderate assist (50% patient effort);2 person assist required  -CW moderate assist (50% patient effort);2 person assist required  -MS     Recorded by [CW] Brett Bennett [MS] Ari Chairez, PT     Transfer Assessment/Treatment    Transfers, Sit-Stand [place] minimum assist (75% patient effort)  -CW minimum assist (75% patient effort)  -MS     Transfers, Stand-Sit [place] minimum assist (75% patient effort)  -CW minimum assist (75% patient effort)  -MS     Transfers, Sit-Stand-Sit, Assist Device rolling walker  -CW  rolling walker  -MS     Recorded by [CW] Brett Bennett [MS] Ari Chairez, PT     Gait Assessment/Treatment    Gait, [place] Level contact guard assist;2 person assist required  -CW contact guard assist;2 person assist required  -MS     Gait, Assistive Device rolling walker  -CW rolling walker  -MS     Gait, Distance (Feet) 100  -CW 45  -MS     Gait, Gait Deviations marco a decreased;step length decreased;stride length decreased  -CW marco a decreased;forward flexed posture;limb motion velocity decreased;step length decreased  -MS     Gait, Safety Issues  balance decreased during turns;step length decreased;supplemental O2   Pt. on 2 liters oxygen.  -MS     Gait, Comment  Min. verbal/tactile cues for posture correction and RWX guidance.  -MS     Recorded by [CW] Brett Bennett [MS] Ari Chairez, PT     Balance Skills Training    Sitting-Level of Assistance Close supervision  -CW Close supervision  -MS     Sitting-Balance Support Feet supported;Right upper extremity supported;Left upper extremity supported  -CW Feet supported;Right upper extremity supported;Left upper extremity supported  -MS     Recorded by [CW] Brett Bennett [MS] Ari Chairez, CAHO     Therapy Exercises    Bilateral Lower Extremities AROM:;10 reps;sitting;ankle pumps/circles;hip flexion;LAQ  -CW AROM:;10 reps;sitting;ankle pumps/circles;hip flexion;LAQ  -MS     Recorded by [CW] Brett Bennett [MS] Ari Chairez, CHAO     Positioning and Restraints    Pre-Treatment Position in bed  -CW in bed  -MS     Post Treatment Position bed  -CW bed  -MS     In Bed supine;call light within reach;encouraged to call for assist;exit alarm on  -CW notified nsg;supine;call light within reach;encouraged to call for assist;exit alarm on;RLE elevated;LLE elevated   All lines intact. V.S.S.  -MS     Recorded by [CW] Brett Bennett [MS] Ari Chairez, PT       User Key  (r) = Recorded By, (t) = Taken By, (c) = Cosigned By     Initials Name Effective Dates    MS Ari Chairez, PT 12/01/15 -     CW Brett OAKLEY Donald 12/13/16 -                 IP PT Goals       02/13/17 0956 02/06/17 1158       Bed Mobility PT LTG    Bed Mobility PT LTG, Date Established 02/13/17  -MS 02/06/17  -MS     Bed Mobility PT LTG, Time to Achieve 5 - 7 days  -MS 5 - 7 days  -MS     Bed Mobility PT LTG, Activity Type all bed mobility  -MS all bed mobility  -MS     Bed Mobility PT LTG, [place] Level minimum assist (75% patient effort)  -MS minimum assist (75% patient effort);2 person assist required  -MS     Transfer Training PT LTG    Transfer Training PT LTG, Date Established 02/13/17  -MS 02/06/17  -MS     Transfer Training PT LTG, Time to Achieve 5 - 7 days  -MS 5 - 7 days  -MS     Transfer Training PT LTG, Activity Type sit to stand/stand to sit  -MS sit to stand/stand to sit  -MS     Transfer Training PT LTG, [place] Level contact guard assist  -MS minimum assist (75% patient effort);2 person assist required  -MS     Transfer Training PT LTG, Assist Device walker, rolling  -MS walker, rolling  -MS     Transfer Training 2 PT LTG    Transfer Training PT 2 LTG, Date Established 02/13/17  -MS 02/06/17  -MS     Transfer Training PT 2 LTG, Time to Achieve 5 - 7 days  -MS 5 - 7 days  -MS     Transfer Training PT 2 LTG, Activity Type bed to chair /chair to bed  -MS bed to chair /chair to bed  -MS     Transfer Training PT 2 LTG, [place] Level contact guard assist  -MS minimum assist (75% patient effort);2 person assist required  -MS     Transfer Training PT 2 LTG, Assist Device walker, rolling  -MS walker, rolling  -MS     Gait Training PT LTG    Gait Training Goal PT LTG, Date Established 02/13/17  -MS 02/06/17  -MS     Gait Training Goal PT LTG, Time to Achieve 5 - 7 days  -MS 5 - 7 days  -MS     Gait Training Goal PT LTG, [place] Level contact guard assist  -MS minimum assist (75% patient effort);2 person assist required  -MS     Gait Training  Goal PT LTG, Assist Device walker, rolling  -MS walker, rolling  -MS     Gait Training Goal PT LTG, Distance to Achieve 80 feet  -MS 30 feet  -MS       User Key  (r) = Recorded By, (t) = Taken By, (c) = Cosigned By    Initials Name Provider Type    MS Ari DEMPSEY Mihaela, PT Physical Therapist          Physical Therapy Education     Title: PT OT SLP Therapies (Done)     Topic: Physical Therapy (Done)     Point: Mobility training (Done)    Learning Progress Summary    Learner Readiness Method Response Comment Documented by Status   Patient Acceptance E,TB DU,VU   02/14/17 1028 Done    Acceptance E,D VU,NR  MS 02/13/17 0956 Done    Acceptance E,D VU,NR  MS 02/10/17 0934 Done    Acceptance E,TB DU,VU   02/09/17 1554 Done    Acceptance E,TB,D DU,VU   02/08/17 0943 Done    Acceptance D,E NR,VU  MS 02/06/17 1158 Done               Point: Home exercise program (Done)    Learning Progress Summary    Learner Readiness Method Response Comment Documented by Status   Patient Acceptance E,TB DU,VU   02/14/17 1028 Done    Acceptance E,D VU,NR  MS 02/13/17 0956 Done    Acceptance E,D VU,NR  MS 02/10/17 0934 Done    Acceptance E,TB DU,VU   02/09/17 1554 Done    Acceptance E,TB,D DU,VU   02/08/17 0943 Done    Acceptance D,E NR,VU  MS 02/06/17 1158 Done               Point: Body mechanics (Done)    Learning Progress Summary    Learner Readiness Method Response Comment Documented by Status   Patient Acceptance E,TB DU,VU   02/14/17 1028 Done    Acceptance E,D VU,NR  MS 02/13/17 0956 Done    Acceptance E,D VU,NR  MS 02/10/17 0934 Done    Acceptance E,TB DU,VU   02/09/17 1554 Done    Acceptance E,TB,D DU,VU   02/08/17 0943 Done    Acceptance D,E NR,VU  MS 02/06/17 1158 Done               Point: Precautions (Done)    Learning Progress Summary    Learner Readiness Method Response Comment Documented by Status   Patient Acceptance E,TB DU,VU   02/14/17 1028 Done    Acceptance E,D VU,NR  MS 02/13/17 0956 Done    Acceptance  E,D VEE,SOY  MS 02/10/17 0934 Done    Acceptance E,TB DUVEE  CW 02/09/17 1554 Done    Acceptance E,TB,D VEE CAMILO  CW 02/08/17 0943 Done    Acceptance D,E SOYVEE  MS 02/06/17 1158 Done                      User Key     Initials Effective Dates Name Provider Type Discipline    MS 12/01/15 -  Ari Chairez, PT Physical Therapist PT     12/13/16 -  Brett Bennett Physical Therapy Assistant PT                    PT Recommendation and Plan  Anticipated Discharge Disposition: skilled nursing facility  Planned Therapy Interventions: balance training, bed mobility training, gait training, patient/family education, postural re-education, ROM (Range of Motion), strengthening, transfer training  PT Frequency: daily  Plan of Care Review  Plan Of Care Reviewed With: patient  Progress: progress toward functional goals as expected  Outcome Summary/Follow up Plan: Pt is increasing with strength and balance to improve with amb distance and safety.  Pt is also decreased pain in B LE          Outcome Measures       02/14/17 1000 02/13/17 0900 02/12/17 1400    How much help from another person do you currently need...    Turning from your back to your side while in flat bed without using bedrails? 3  -CW 3  -MS 2  -ZK    Moving from lying on back to sitting on the side of a flat bed without bedrails? 3  -CW 2  -MS 2  -ZK    Moving to and from a bed to a chair (including a wheelchair)? 3  -CW 3  -MS 2  -ZK    Standing up from a chair using your arms (e.g., wheelchair, bedside chair)? 3  -CW 3  -MS 2  -ZK    Climbing 3-5 steps with a railing? 2  -CW 2  -MS 1  -ZK    To walk in hospital room? 3  -CW 3  -MS 3  -ZK    AM-PAC 6 Clicks Score 17  -CW 16  -MS 12  -ZK    Functional Assessment    Outcome Measure Options AM-PAC 6 Clicks Basic Mobility (PT)  -CW AM-PAC 6 Clicks Basic Mobility (PT)  -MS AM-PAC 6 Clicks Basic Mobility (PT)  -ZK      User Key  (r) = Recorded By, (t) = Taken By, (c) = Cosigned By    Initials Name Provider Type    ZK  Zorre Zeno Kimura, PT Physical Therapist    MS Ari DEMPSEY Mihaela, PT Physical Therapist    CW Brett Bennett Physical Therapy Assistant           Time Calculation:         PT Charges       02/14/17 1030          Time Calculation    Start Time 1004  -CW      Stop Time 1030  -CW      Time Calculation (min) 26 min  -CW      PT Received On 02/14/17  -CW      PT - Next Appointment 02/15/17  -CW        User Key  (r) = Recorded By, (t) = Taken By, (c) = Cosigned By    Initials Name Provider Type    CW Brett Bennett Physical Therapy Assistant          Therapy Charges for Today     Code Description Service Date Service Provider Modifiers Qty    88091597074 HC PT THER SUPP EA 15 MIN 2/14/2017 Brett Bennett GP 2    64503369184 HC PT THER PROC EA 15 MIN 2/14/2017 Brett Bennett GP 2          PT G-Codes  Outcome Measure Options: AM-PAC 6 Clicks Basic Mobility (PT)    Brett Bennett  2/14/2017

## 2017-02-14 NOTE — PROGRESS NOTES
Dr. TERESO Reddy    [address]    2/14/2017    Patient ID:  Name:  Halley Becerra  MRN:  [id number]  11/11/1931  85 y.o.  female            CC/Reason for visit: Follow-up for pneumonia    HPI: The patient tried walking today.  Overall she denies shortness of breath or cough    Vitals:  Vitals:    02/14/17 0500 02/14/17 0743 02/14/17 0837 02/14/17 0901   BP:  136/53     BP Location:  Left arm     Patient Position:  Lying     Pulse:  58 58 60   Resp:  16  16   Temp:  98 °F (36.7 °C)     TempSrc:  Oral     SpO2:  100%  95%   Weight: 209 lb (94.8 kg)      Height:               Body mass index is 38.23 kg/(m^2).    Intake/Output Summary (Last 24 hours) at 02/14/17 1203  Last data filed at 02/14/17 0901   Gross per 24 hour   Intake    780 ml   Output    625 ml   Net    155 ml       Exam:  GEN:  No distress, appears stated age  EYES:   EOM-i, anicteric sclera bilat  ENT:    External ears/nose normal, OP clear  NECK:  Supple, midline trachea  LUNGS: Diminished bilaterally, but no wheezing or rhonchi, nonlabored breathing  CV:  Normal S1S2, without murmur, no edema  ABD:  Non tender, no enlarged liver or masses  EXT:  No cyanosis or clubbing    Scheduled meds:    aspirin 81 mg Oral Daily   budesonide-formoterol 2 puff Inhalation BID - RT   camphor-menthol  Topical BID   carvedilol 12.5 mg Oral BID With Meals   donepezil 5 mg Oral Nightly   enoxaparin 30 mg Subcutaneous Q24H   fluocinonide  Topical BID   gabapentin 100 mg Oral Q12H   insulin aspart 0-7 Units Subcutaneous 4x Daily AC & at Bedtime   insulin aspart 5 Units Subcutaneous TID AC   insulin detemir 28 Units Subcutaneous Nightly   lisinopril 5 mg Oral Daily   miconazole  Topical BID   nystatin  Topical BID   potassium chloride 10 mEq Oral Daily     IV meds:                           Data Review:   I reviewed the patient's medications and new clinical results.  Lab Results   Component Value Date    CALCIUM 9.8 02/14/2017    PHOS 3.4 07/09/2014      Results from last 7 days  Lab Units 02/14/17  0456 02/13/17  1156 02/13/17  0810 02/12/17  0519 02/10/17  0510  02/09/17  0113   AST (SGOT) U/L  --   --  15  --  16  --   --    MAGNESIUM mg/dL  --   --   --   --   --   --  2.1   SODIUM mmol/L 148* 146* 148* 146* 144  < >  --    POTASSIUM mmol/L 3.8 3.8 3.9 3.6 3.7  < > 4.1   CHLORIDE mmol/L 105 101 103 101 102  < >  --    TOTAL CO2 mmol/L 34.0* 31.3* 34.4* 30.9* 30.5*  < >  --    BUN mg/dL 56* 53* 54* 44* 45*  < >  --    CREATININE mg/dL 1.76* 1.85* 1.74* 1.64* 1.50*  < >  --    GLUCOSE mg/dL 86 165* 97 132* 96  < >  --    CALCIUM mg/dL 9.8 9.9 10.0 9.8 9.6  < >  --    WBC 10*3/mm3 6.67  --  6.28 7.15 6.25  --   --    HEMOGLOBIN g/dL 9.5*  --  10.0* 9.9* 9.8*  --   --    PLATELETS 10*3/mm3 181  --  188 185 172  --   --    ALT (SGPT) U/L  --   --  8  --  10  --   --    < > = values in this interval not displayed.      ASSESSMENT:   Pneumonia  Acute on chronic diastolic (congestive) heart failure  Acute congestive heart failure  Chronic respiratory failure  COPD (chronic obstructive pulmonary disease)  PAD (peripheral artery disease)  CKD (chronic kidney disease) stage 3, GFR 30-59 ml/min  Diabetes mellitus with chronic kidney disease  Dementia  Cellulitis  Leg pain  Hypernatremia    PLAN:  She remains off Zosyn.  Continue monitoring closely.  Her respiration status is stable.  She is on 2 L of oxygen at rest, without any complaints.  Continue mobilization, ambulation and pulmonary hygiene protocol.  Repeat chest x-ray Friday    Werner Reddy MD  2/14/2017

## 2017-02-14 NOTE — PLAN OF CARE
Problem: Patient Care Overview (Adult)  Goal: Plan of Care Review  Outcome: Ongoing (interventions implemented as appropriate)    02/14/17 0648   Coping/Psychosocial Response Interventions   Plan Of Care Reviewed With patient   Patient Care Overview   Progress improving   Outcome Evaluation   Outcome Summary/Follow up Plan WEIGHT DOWN ANOTHER POUND. WAS ABLE TO VOID ON OWN AFTER F/C WAS REMOVED. VSS. SAFETY MAINTAINED.         Problem: Cardiac: Heart Failure (Adult)  Goal: Signs and Symptoms of Listed Potential Problems Will be Absent or Manageable (Cardiac: Heart Failure)  Outcome: Ongoing (interventions implemented as appropriate)    Problem: Urine Elimination, Impaired (Adult)  Goal: Effective Urinary Elimination  Outcome: Ongoing (interventions implemented as appropriate)

## 2017-02-14 NOTE — PLAN OF CARE
Problem: Patient Care Overview (Adult)  Goal: Plan of Care Review  Outcome: Ongoing (interventions implemented as appropriate)    02/14/17 1029   Coping/Psychosocial Response Interventions   Plan Of Care Reviewed With patient   Patient Care Overview   Progress progress toward functional goals as expected   Outcome Evaluation   Outcome Summary/Follow up Plan Pt is increasing with strength and balance to improve with amb distance and safety. Pt is also decreased pain in B LE

## 2017-02-14 NOTE — PLAN OF CARE
Problem: Patient Care Overview (Adult)  Goal: Plan of Care Review  Outcome: Ongoing (interventions implemented as appropriate)    02/14/17 1147   Coping/Psychosocial Response Interventions   Plan Of Care Reviewed With patient   Patient Care Overview   Progress improving   Outcome Evaluation   Outcome Summary/Follow up Plan pt working with therapy. VSS. HR a little low, BB decreased today. Na still a little high, bumex is being held at this time. should be d/c soon.         Problem: Cardiac: Heart Failure (Adult)  Goal: Signs and Symptoms of Listed Potential Problems Will be Absent or Manageable (Cardiac: Heart Failure)  Outcome: Outcome(s) achieved Date Met:  02/14/17

## 2017-02-14 NOTE — PROGRESS NOTES
[facility]     LOS: 9 days     Subjective:  No cp  No soa  Eating some  No nausea and vomiting  Leg pain is much better  Walked in [place]    Objective:    Vital Signs:  Temp:  [98 °F (36.7 °C)-98.9 °F (37.2 °C)] 98 °F (36.7 °C)  Heart Rate:  [55-60] 58  Resp:  [16-18] 16  BP: (107-136)/(37-57) 136/53    General Appearance: no acute distress, appears comfortable  Heart: regular rate and rhythm  Lungs: clear to auscultation bilaterally, respirations unlabored, good air entry  Abdomen: soft, non-tender, no guarding, no rebound, non-distended  Extremeties: trace edema, no cyanosis  Neurology: speech normal, mental status normal  Mental Status: alert, confused concerning my conversation with her  + body wall edema, about the same  No warmth in the legs or of the abd    Results Review:    GLUCOSE   Date Value Ref Range Status   02/14/2017 86 65 - 99 mg/dL Final   02/13/2017 165 (H) 65 - 99 mg/dL Final   02/13/2017 97 65 - 99 mg/dL Final   02/12/2017 132 (H) 65 - 99 mg/dL Final       Results from last 7 days  Lab Units 02/14/17  0456   WBC 10*3/mm3 6.67   HEMOGLOBIN g/dL 9.5*   HEMATOCRIT % 31.4*   PLATELETS 10*3/mm3 181       Results from last 7 days  Lab Units 02/14/17  0456  02/13/17  0810   SODIUM mmol/L 148*  < > 148*   POTASSIUM mmol/L 3.8  < > 3.9   CHLORIDE mmol/L 105  < > 103   TOTAL CO2 mmol/L 34.0*  < > 34.4*   BUN mg/dL 56*  < > 54*   CREATININE mg/dL 1.76*  < > 1.74*   CALCIUM mg/dL 9.8  < > 10.0   BILIRUBIN mg/dL  --   --  0.4   ALK PHOS U/L  --   --  71   ALT (SGPT) U/L  --   --  8   AST (SGOT) U/L  --   --  15   GLUCOSE mg/dL 86  < > 97   < > = values in this interval not displayed.        Results from last 7 days  Lab Units 02/09/17  0113   MAGNESIUM mg/dL 2.1             I have reviewed daily medications and changes in CPOE    Scheduled meds    aspirin 81 mg Oral Daily   budesonide-formoterol 2 puff Inhalation BID - RT   camphor-menthol  Topical BID   carvedilol 12.5 mg Oral  BID With Meals   donepezil 5 mg Oral Nightly   enoxaparin 30 mg Subcutaneous Q24H   fluocinonide  Topical BID   gabapentin 100 mg Oral Q12H   insulin aspart 0-7 Units Subcutaneous 4x Daily AC & at Bedtime   insulin aspart 5 Units Subcutaneous TID AC   insulin detemir 28 Units Subcutaneous Nightly   lisinopril 5 mg Oral Daily   miconazole  Topical BID   nystatin  Topical BID   potassium chloride 10 mEq Oral Daily          PRN meds  dextrose  •  dextrose  •  glucagon (human recombinant)  •  HYDROcodone-acetaminophen  •  miconazole  •  Insert peripheral IV **AND** sodium chloride    Assessment:    Principal Problem:    Acute on chronic diastolic (congestive) heart failure  Active Problems:    Acute congestive heart failure    COPD (chronic obstructive pulmonary disease)    PAD (peripheral artery disease)    CKD (chronic kidney disease) stage 3, GFR 30-59 ml/min    Diabetes mellitus with chronic kidney disease    Dementia    Cellulitis    Leg pain    Hypernatremia    1. Acute and chronic diastolic congestive heart failure. Improving. Legs without edema but still with body wall edema. Hold the bumex this am.    2. Erythema both shins, procal remains normal. Shin pain is better, treated wth vanc for 4 days on admission, doubt cellulitis    3. Acute kidney injury/chronic kidney disease stage III. Creatinine is up slightly    4. Diabetes mellitus type 2. Glucoses are good, monitor    5. Dementia. Continue medications.     6. Anemia.   Probably secondary to chronic kidney disease. monitor     7. Leg pain- much better     8. Concern for Pneumonia based on the xray but the patient is afebrile, wbc is normal, procal minimal increase. chest xray is better very quickly and just got one dose of zosyn (doubt infection).  She is currently off antibiotics, resp panel is negative, mrsa is negative.     9. Hypernatremia- hold bumex this am, intravascular depleted    10. dvt prophylaxis with lovenox    Dispo:    Sodium is still high,  would like this to come down slightly before d/c.    She is going to need some diuretics on D/C    procal minimally increased, recheck tmorow     check crp in the am     D/w g-daughter ([place])      Emiliano Patel MD  02/14/17  8:01 AM

## 2017-02-15 PROBLEM — L03.90 CELLULITIS: Status: RESOLVED | Noted: 2017-01-01 | Resolved: 2017-01-01

## 2017-02-15 NOTE — DISCHARGE SUMMARY
DATE OF ADMISSION:  02/05/2017  DATE OF DISCHARGE:  02/15/2017     DISCHARGE DIAGNOSIS: Acute and chronic diastolic congestive heart failure.     OTHER DIAGNOSES:  1. Chronic kidney disease, stage 3.  2. Diabetes mellitus type 2 with hypoglycemia and hyperglycemia.   3. Diabetes mellitus with chronic kidney disease.   4. Dementia.    5. 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  6. Stasis dermatitis of both lower extremities.   7. Peripheral artery disease.    8. Chronic obstructive pulmonary disease.     CONSULTANTS:  1. Werner Reddy MD  2. Carloz Vanegas MD    PROCEDURES PERFORMED:    1. CT abdomen and pelvis without contrast 02/06/2017, which shows extensive body wall edema extending from chest through the lower extremities. Small amount of ascites. Chronic small pleural effusions, partially loculated. Atelectasis present.   2. Echocardiogram 02/06/2017, which showed EF of 63%, grade 2 diastolic dysfunction. Mild dilatation right ventricle, RV systolic pressure greater than 55. Moderate tricuspid regurgitation. Mild mitral regurgitation, mild aortic calcification.   3. Venous Dopplers of the lower extremities 02/06/2017, which showed chronic left lower extremity superficial thrombophlebitis, otherwise normal.     HOSPITAL COURSE: The patient was transferred from the emergency room for complaints of leg swelling. There was initially some concern about pneumonia and she was given empirically a dose of Zosyn. She had extensive lower extremity edema secondary to acute and chronic diastolic congestive heart failure. She was started on IV diuretics. Echocardiogram was done with results as noted above. She was seen by Dr. Vanegas who agreed with the plan. She had erythema along both shins, likely from stasis dermatitis. She received 5 doses of vancomycin to cover for any possible cellulitis. She was seen by Dr. Reddy for the abnormal chest x-ray. It showed some worsening on 02/12/2017. Pulmonary toilet was  continued. Over the last couple of days, the patient's sodium level has increased. Diuretic dosage was first decreased and then the diuretic was held yesterday. The patient is feeling much better. She is ready for transfer back to subacute rehabilitation. Bumex can be restarted in 2 or 3 days. Recheck electrolytes in 2 days. Maintain low sodium diet.     CONDITION: Stable.     PROGNOSIS: Fair prognosis given her age and underlying heart failure.     DIAGNOSTIC DATA: Labs today: White count is 6.9, hemoglobin 9.9, platelets 194,000. Sodium 148, potassium 3.6, chloride 105, CO2 of 30, BUN 57, creatinine 1.6, glucose 40. A1c 6.0.  CRP today 1.9. Procalcitonin 0.11. BNP 7555. BNP from 02/13/2017 was 9338. MRSA swab was positive from the nares. Respiratory viral panel was negative. Iron 43, iron saturation low at 13, transferrin 221, TIBC 329, B12, folate greater than 20. TSH 3.1. Blood cultures no growth.     DISCHARGE MEDICATIONS:  1. Symbicort 160/4.5 mg 2 puffs b.i.d.   2. Sarna lotion applied b.i.d. to affected area.   3. Lovenox 30 mg subcutaneous daily until activity level is increased.   4. Neurontin 100 mg q.12 h.   5. Bumex 2 mg daily, to restart 02/18/2017 or when directed by the nursing home physician.   6. Coreg 6.25 mg b.i.d. (new dose).   7. Lidex 0.05% cream applied b.i.d. to the affected area.    8. NovoLog 5 units t.i.d. with meals.   9. Levemir 28 units subcutaneously daily (new dose).    10. Miconazole 2% cream applied to the affected areas b.i.d.   11. Tylenol 650 mg q.6 h. p.r.n.   12. Aspirin 81 mg daily.   13. Aricept 5 mg daily.   14. Lisinopril 5 mg daily.     DISCONTINUED MEDICATIONS:   1. Multivitamin.    2. Potassium.    3. Advair.     DISCHARGE INSTRUCTIONS:    1. Diet: Healthy heart, no concentrated sweets, low sodium.   2. Activity: Continue with physical therapy. Up with assistance.     FOLLOWUP:  1. Follow up with the nursing home physician in the next day.   2. BMP on 02/17/2017 with  results called to the nursing home physician.   3. Follow up with Dr. Brenton Stallworth on 02/27/2017.     I have reviewed the patient's medical record. I discussed with nurse and with CCP. I discussed with the patient.     Total time including preparation for discharge was 40 minutes.       Ellen Chakraborty M.D.  JH:debbie  D:   02/15/2017 12:11:27  T:   02/15/2017 12:26:51  Job ID:   [id number]  Document ID:   [id number]  cc:

## 2017-02-15 NOTE — PROGRESS NOTES
Dr. TERESO Reddy    [address]    2/15/2017    Patient ID:  Name:  Halley Becerra  MRN:  [id number]  11/11/1931  85 y.o.  female            CC/Reason for visit: Follow-up for pneumonia    HPI: The patient feels okay.  She denies any shortness of breath at rest.  She actually ambulated a few times yesterday    Vitals:  Vitals:    02/14/17 1916 02/15/17 0011 02/15/17 0650 02/15/17 0733   BP: (!) 119/39 125/45  (!) 117/39   BP Location:    Right arm   Patient Position:    Lying   Pulse: 61 65  60   Resp: 16 16  20   Temp: 100.1 °F (37.8 °C) 98.3 °F (36.8 °C)  98.2 °F (36.8 °C)   TempSrc:    Oral   SpO2: 99% 97%  93%   Weight:   214 lb 9.6 oz (97.3 kg)    Height:               Body mass index is 39.25 kg/(m^2).    Intake/Output Summary (Last 24 hours) at 02/15/17 1109  Last data filed at 02/15/17 0011   Gross per 24 hour   Intake    680 ml   Output    900 ml   Net   -220 ml       Exam:  LUNGS: Diminished bilaterally, but no wheezing or rhonchi, nonlabored breathing  CV:  Normal S1S2, without murmur, no edema  ABD:  Non tender, no enlarged liver or masses  EXT:  No cyanosis or clubbing    Scheduled meds:    aspirin 81 mg Oral Daily   budesonide-formoterol 2 puff Inhalation BID - RT   camphor-menthol  Topical BID   carvedilol 6.25 mg Oral BID With Meals   donepezil 5 mg Oral Nightly   enoxaparin 30 mg Subcutaneous Q24H   fluocinonide  Topical BID   gabapentin 100 mg Oral Q12H   insulin aspart 0-7 Units Subcutaneous 4x Daily AC & at Bedtime   insulin aspart 5 Units Subcutaneous TID AC   insulin detemir 28 Units Subcutaneous Nightly   lisinopril 5 mg Oral Daily   miconazole  Topical BID   nystatin  Topical BID   potassium chloride 10 mEq Oral Daily     IV meds:                           Data Review:   I reviewed the patient's medications and new clinical results.  Lab Results   Component Value Date    CALCIUM 9.9 02/15/2017    PHOS 3.4 07/09/2014     Results from last 7 days  Lab Units 02/15/17  0515  02/14/17  0456 02/13/17  1156 02/13/17  0810  02/10/17  0510  02/09/17  0113   AST (SGOT) U/L  --   --   --  15  --  16  --   --    MAGNESIUM mg/dL  --   --   --   --   --   --   --  2.1   SODIUM mmol/L 148* 148* 146* 148*  < > 144  < >  --    POTASSIUM mmol/L 3.6 3.8 3.8 3.9  < > 3.7  < > 4.1   CHLORIDE mmol/L 105 105 101 103  < > 102  < >  --    TOTAL CO2 mmol/L 30.2* 34.0* 31.3* 34.4*  < > 30.5*  < >  --    BUN mg/dL 57* 56* 53* 54*  < > 45*  < >  --    CREATININE mg/dL 1.62* 1.76* 1.85* 1.74*  < > 1.50*  < >  --    GLUCOSE mg/dL 40* 86 165* 97  < > 96  < >  --    CALCIUM mg/dL 9.9 9.8 9.9 10.0  < > 9.6  < >  --    WBC 10*3/mm3 6.88 6.67  --  6.28  < > 6.25  --   --    HEMOGLOBIN g/dL 9.9* 9.5*  --  10.0*  < > 9.8*  --   --    PLATELETS 10*3/mm3 194 181  --  188  < > 172  --   --    ALT (SGPT) U/L  --   --   --  8  --  10  --   --    < > = values in this interval not displayed.        ASSESSMENT:   Pneumonia  Acute on chronic diastolic (congestive) heart failure  Acute congestive heart failure  Chronic respiratory failure  COPD (chronic obstructive pulmonary disease)  PAD (peripheral artery disease)  CKD (chronic kidney disease) stage 3, GFR 30-59 ml/min  Diabetes mellitus with chronic kidney disease  Dementia  Cellulitis  Leg pain  Hypernatremia    PLAN:  The patient may be discharged today.  Needs follow-up chest x-ray in one week from today.  No need to follow-up with us.  I will have respiratory therapy ambulate her right now to check her oxygen requirements during exertion.  Continue oxygen at night, which she had been doing prior to admission.    Werner Reddy MD  2/15/2017

## 2017-02-15 NOTE — PROGRESS NOTES
"    Patient Name: Halley Becerra  :1931  85 y.o.      Patient Care Team:  Jono Verde Jr., MD as PCP - General (Geriatric Medicine)    Chief Complaint: acute on chronic diastolic CHF    Interval History: She has no new complaints.    Objective   Vital Signs  Temp:  [97.2 °F (36.2 °C)-100.1 °F (37.8 °C)] 98.2 °F (36.8 °C)  Heart Rate:  [60-65] 60  Resp:  [16-20] 20  BP: (117-125)/(39-45) 117/39    Intake/Output Summary (Last 24 hours) at 02/15/17 0927  Last data filed at 02/15/17 0011   Gross per 24 hour   Intake    680 ml   Output    900 ml   Net   -220 ml     Flowsheet Rows         First Filed Value    Admission Height  62\" (157.5 cm) Documented at 2017 1613    Admission Weight  229 lb (104 kg) Documented at 2017 1613          Physical Exam:   General Appearance:    Alert, cooperative, in no acute distress   Lungs:     Clear to auscultation.  Normal respiratory effort and rate.      Heart:    Regular rhythm and normal rate, normal S1 and S2, no murmurs, gallops or rubs.     Chest Wall:    No abnormalities observed   Abdomen:     Soft, nontender, positive bowel sounds.     Extremities:   no cyanosis, clubbing or edema.  No marked joint deformities.  Adequate musculoskeletal strength.       Results Review:      Results from last 7 days  Lab Units 02/15/17  0515   SODIUM mmol/L 148*   POTASSIUM mmol/L 3.6   CHLORIDE mmol/L 105   TOTAL CO2 mmol/L 30.2*   BUN mg/dL 57*   CREATININE mg/dL 1.62*   GLUCOSE mg/dL 40*   CALCIUM mg/dL 9.9           Results from last 7 days  Lab Units 02/15/17  0515   WBC 10*3/mm3 6.88   HEMOGLOBIN g/dL 9.9*   HEMATOCRIT % 32.7*   PLATELETS 10*3/mm3 194           Results from last 7 days  Lab Units 17  0113   MAGNESIUM mg/dL 2.1                   Medication Review:     aspirin 81 mg Oral Daily   budesonide-formoterol 2 puff Inhalation BID - RT   camphor-menthol  Topical BID   carvedilol 6.25 mg Oral BID With Meals   donepezil 5 mg Oral Nightly   enoxaparin 30 mg " Subcutaneous Q24H   fluocinonide  Topical BID   gabapentin 100 mg Oral Q12H   insulin aspart 0-7 Units Subcutaneous 4x Daily AC & at Bedtime   insulin aspart 5 Units Subcutaneous TID AC   insulin detemir 28 Units Subcutaneous Nightly   lisinopril 5 mg Oral Daily   miconazole  Topical BID   nystatin  Topical BID   potassium chloride 10 mEq Oral Daily             Assessment/Plan     Acute on chronic diastoic CHF.  Moderate AS.  She  responded well to IV bumex infusion.  She wants to try to go home soon.    I think she is intravascularly dry, though total body she still has come fluid overload.  Her increased BUN/Cr suggests we should hold off on diuresis.  I think the rest of her diuresis can be performed as an outpatient.    She remains a little hypernatremic.    Brenton Stallworth MD  [facility]  02/15/17  9:27 AM

## 2017-02-15 NOTE — PLAN OF CARE
Problem: Patient Care Overview (Adult)  Goal: Plan of Care Review  Outcome: Ongoing (interventions implemented as appropriate)    02/14/17 1835 02/15/17 0632   Coping/Psychosocial Response Interventions   Plan Of Care Reviewed With --  patient   Patient Care Overview   Progress improving --    Outcome Evaluation   Outcome Summary/Follow up Plan pt working with therapy. VSS. HR a little low, BB decreased today. Na still a little high, bumex is being held at this time. should be d/c soon. --        Goal: Adult Individualization and Mutuality  Outcome: Ongoing (interventions implemented as appropriate)  Goal: Discharge Needs Assessment  Outcome: Ongoing (interventions implemented as appropriate)

## 2017-02-15 NOTE — PROGRESS NOTES
"    Patient Name: Halley Becerra  :1931  85 y.o.      Patient Care Team:  Jono Verde Jr., MD as PCP - General (Geriatric Medicine)    Chief Complaint: acute on chronic diastolic CHF    Interval History: She has no new complaints.    Objective   Vital Signs  Temp:  [97.2 °F (36.2 °C)-100.1 °F (37.8 °C)] 100.1 °F (37.8 °C)  Heart Rate:  [58-62] 61  Resp:  [16-18] 16  BP: (119-136)/(39-53) 119/39    Intake/Output Summary (Last 24 hours) at 17 2113  Last data filed at 17 1716   Gross per 24 hour   Intake    890 ml   Output    925 ml   Net    -35 ml     Flowsheet Rows         First Filed Value    Admission Height  62\" (157.5 cm) Documented at 2017 1613    Admission Weight  229 lb (104 kg) Documented at 2017 1613          Physical Exam:   General Appearance:    Alert, cooperative, in no acute distress   Lungs:     Clear to auscultation.  Normal respiratory effort and rate.      Heart:    Regular rhythm and normal rate, normal S1 and S2, no murmurs, gallops or rubs.     Chest Wall:    No abnormalities observed   Abdomen:     Soft, nontender, positive bowel sounds.     Extremities:   no cyanosis, clubbing or edema.  No marked joint deformities.  Adequate musculoskeletal strength.       Results Review:      Results from last 7 days  Lab Units 17  0456   SODIUM mmol/L 148*   POTASSIUM mmol/L 3.8   CHLORIDE mmol/L 105   TOTAL CO2 mmol/L 34.0*   BUN mg/dL 56*   CREATININE mg/dL 1.76*   GLUCOSE mg/dL 86   CALCIUM mg/dL 9.8           Results from last 7 days  Lab Units 17  0456   WBC 10*3/mm3 6.67   HEMOGLOBIN g/dL 9.5*   HEMATOCRIT % 31.4*   PLATELETS 10*3/mm3 181           Results from last 7 days  Lab Units 17  0113   MAGNESIUM mg/dL 2.1                   Medication Review:     aspirin 81 mg Oral Daily   budesonide-formoterol 2 puff Inhalation BID - RT   camphor-menthol  Topical BID   carvedilol 6.25 mg Oral BID With Meals   donepezil 5 mg Oral Nightly   enoxaparin 30 mg " Subcutaneous Q24H   fluocinonide  Topical BID   gabapentin 100 mg Oral Q12H   insulin aspart 0-7 Units Subcutaneous 4x Daily AC & at Bedtime   insulin aspart 5 Units Subcutaneous TID AC   insulin detemir 28 Units Subcutaneous Nightly   lisinopril 5 mg Oral Daily   miconazole  Topical BID   nystatin  Topical BID   potassium chloride 10 mEq Oral Daily             Assessment/Plan     Acute on chronic diastoic CHF.  Moderate AS.  She  responded well to IV bumex infusion.  She wants to try to go home soon.    I think she is intravascularly dry, though total body she still has come fluid overload.  Her increased BUN/Cr suggests we should decrease bumex to 1 mg BID for a bit.  I think the rest of her diuresis can be performed as an outpatient.      Brenton Stallworth MD  [facility]  02/14/17  9:13 PM

## 2017-02-15 NOTE — PLAN OF CARE
Problem: Patient Care Overview (Adult)  Goal: Plan of Care Review    02/15/17 0914   Coping/Psychosocial Response Interventions   Plan Of Care Reviewed With patient;family   Patient Care Overview   Progress progress towards functional goals is fair   Outcome Evaluation   Outcome Summary/Follow up Plan Improved tolerance to functional activity this day with an increase in gait distance. Pt. still limited overall by fatigue/SOA but has steadily improved each day with general functional mobility. Continues to participate in ther. ex. program.

## 2017-02-15 NOTE — PROGRESS NOTES
"Acute Care - Physical Therapy Treatment Note  [facility]     Patient Name: Halley Becerra  : 1931  MRN: [id number]  Today's Date: 2/15/2017  Onset of Illness/Injury or Date of Surgery Date: 17  Date of Referral to PT: 17  Referring Physician: Ellen Chairez    Admit Date: 2017    Visit Dx:    ICD-10-CM ICD-9-CM   1. Acute congestive heart failure, unspecified congestive heart failure type I50.9 428.0   2. Pneumonia of both lungs due to infectious organism, unspecified part of lung J18.9 483.8   3. Cellulitis of lower extremity, unspecified laterality L03.119 682.6   4. Muscle weakness (generalized) M62.81 728.87     Patient Active Problem List   Diagnosis   • Acute congestive heart failure   • COPD (chronic obstructive pulmonary disease)   • PAD (peripheral artery disease)   • CKD (chronic kidney disease) stage 3, GFR 30-59 ml/min   • Diabetes mellitus with chronic kidney disease   • Dementia   • Cellulitis   • Leg pain   • Acute on chronic diastolic (congestive) heart failure   • Hypernatremia               Adult Rehabilitation Note       02/15/17 0908 17 1000 17 0952    Rehab Assessment/Intervention    Discipline physical therapist  -MS physical therapy assistant  -CW physical therapist  -MS    Document Type therapy note (daily note)  -MS therapy note (daily note)  -CW therapy note (daily note)  -MS    Subjective Information agree to therapy;complains of;fatigue;pain;dyspnea  -MS agree to therapy;complains of;fatigue;pain  -CW agree to therapy;complains of;fatigue;pain  -MS    Patient Effort, Rehab Treatment good  -MS adequate  -CW good  -MS    Symptoms Noted Comment Pt. reports she is \"ready to go home\" with continued c/o Bilateral L.E. pains and mild SOA with upright activity.  -MS  Pt. reports pain/tenderness in her Bilateral L.E.'s (Knee to toes) with touch only. Otherwise, pt. feeling \"better\" despite overall fatigue.  -MS    Precautions/Limitations fall precautions "   2 Liters oxygen; Exit alarm; Dec. skin integrity  -MS fall precautions;oxygen therapy device and L/min  -CW fall precautions   Exit alarm; 2 Liters oxygen; Contact Isolation  -MS    Specific Treatment Considerations Precautions taken to help minimize/eliminate all friction/shearing forces to pt.'s skin during mobility.  -MS  Precautions taken to minimize/eliminate all fiction/shearing forces to pt.'s skin during mobility.  -MS    Recorded by [MS] Ari Chairez, PT [CW] Brett Bennett [MS] Ari Chairez, PT    Vital Signs    Pre SpO2 (%) 99  -MS      O2 Delivery Pre Treatment supplemental O2   2 Liters oxygen  -MS      Post SpO2 (%) 92   Immediately post ambulation, sitting EOB.  -MS 95  -CW     O2 Delivery Post Treatment supplemental O2   2 Liters oxygen  -MS supplemental O2  -CW     Recorded by [MS] Ari Chairez, PT [CW] Brett Bennett     Pain Assessment    Pain Assessment 0-10  -MS 0-10  -CW 0-10  -MS    Pain Score 3  -MS 3  -CW 3  -MS    Post Pain Score 3  -MS  3  -MS    Pain Location Leg  -MS Leg  -CW Leg  -MS    Pain Orientation Right;Left  -MS Right;Left  -CW Right;Left  -MS    Pain Frequency   Intermittent   Pain only when her L.E.'s are touched.  -MS    Recorded by [MS] Ari Chairez, PT [CW] Brett Bennett [MS] Ari Chairez, PT    Cognitive Assessment/Intervention    Current Cognitive/Communication Assessment functional  -MS functional  -CW functional  -MS    Orientation Status oriented x 4  -MS oriented x 4  -CW oriented x 4  -MS    Follows Commands/Answers Questions 100% of the time  -% of the time  -% of the time  -MS    Personal Safety WNL/WFL  -MS WNL/WFL  -CW WNL/WFL  -MS    Personal Safety Interventions fall prevention program maintained;gait belt;nonskid shoes/slippers when out of bed;supervised activity  -MS fall prevention program maintained;gait belt;nonskid shoes/slippers when out of bed  -CW fall prevention program maintained;gait belt;nonskid  shoes/slippers when out of bed;supervised activity  -MS    Recorded by [MS] Ari Chairez, PT [CW] Brett Bennett [MS] Ari Chairez PT    Bed Mobility, Assessment/Treatment    Bed Mob, Supine to Sit, [place] minimum assist (75% patient effort);2 person assist required  -MS minimum assist (75% patient effort);2 person assist required  -CW minimum assist (75% patient effort);2 person assist required  -MS    Bed Mob, Sit to Supine, [place] minimum assist (75% patient effort);2 person assist required  -MS moderate assist (50% patient effort);2 person assist required  -CW moderate assist (50% patient effort);2 person assist required  -MS    Recorded by [MS] Ari Chairez PT [CW] Brett Bennett [MS] Ari Chairez PT    Transfer Assessment/Treatment    Transfers, Sit-Stand [place] minimum assist (75% patient effort)  -MS minimum assist (75% patient effort)  -CW minimum assist (75% patient effort)  -MS    Transfers, Stand-Sit [place] minimum assist (75% patient effort)  -MS minimum assist (75% patient effort)  -CW minimum assist (75% patient effort)  -MS    Transfers, Sit-Stand-Sit, Assist Device rolling walker  -MS rolling walker  -CW rolling walker  -MS    Recorded by [MS] Ari Chairez PT [CW] Brett Bennett [MS] Ari Chairez PT    Gait Assessment/Treatment    Gait, [place] Level contact guard assist;2 person assist required  -MS contact guard assist;2 person assist required  -CW contact guard assist;2 person assist required  -MS    Gait, Assistive Device rolling walker  -MS rolling walker  -CW rolling walker  -MS    Gait, Distance (Feet) 120  -  -CW 45  -MS    Gait, Gait Deviations right:;left:;antalgic;marco a decreased;limb motion velocity decreased;step length decreased  -MS marco a decreased;step length decreased;stride length decreased  -CW marco a decreased;forward flexed posture;limb motion velocity decreased;step length decreased  -MS    Gait,  Safety Issues balance decreased during turns;step length decreased;supplemental O2   2 Liters oxygen.  -MS  balance decreased during turns;step length decreased;supplemental O2   Pt. on 2 liters oxygen.  -MS    Gait, Comment x 3 standing rest break; Verbal/tactile cues for posture correction and RWX guidance.  Pt. with c/o mild SOA during ambulation.  -MS  Min. verbal/tactile cues for posture correction and RWX guidance.  -MS    Recorded by [MS] Ari Chairez PT [CW] Brett Bennett [MS] Ari Chairez PT    Balance Skills Training    Sitting-Level of Assistance Close supervision  -MS Close supervision  -CW Close supervision  -MS    Sitting-Balance Support Feet supported;Right upper extremity supported;Left upper extremity supported  -MS Feet supported;Right upper extremity supported;Left upper extremity supported  -CW Feet supported;Right upper extremity supported;Left upper extremity supported  -MS    Recorded by [MS] Ari Chairez PT [CW] Brett Bennett [MS] Ari Chairez PT    Therapy Exercises    Bilateral Lower Extremities AROM:;10 reps;sitting;ankle pumps/circles;hip flexion;LAQ  -MS AROM:;10 reps;sitting;ankle pumps/circles;hip flexion;LAQ  -CW AROM:;10 reps;sitting;ankle pumps/circles;hip flexion;LAQ  -MS    Recorded by [MS] Ari Chairez PT [CW] Bertt Bennett [MS] Ari Chairez PT    Positioning and Restraints    Pre-Treatment Position in bed  -MS in bed  -CW in bed  -MS    Post Treatment Position bed  -MS bed  -CW bed  -MS    In Bed notified nsg;supine;call light within reach;encouraged to call for assist;exit alarm on;with family/caregiver;RLE elevated;LLE elevated;R heel elevated;L heel elevated   All lines intact. V.S.S.  -MS supine;call light within reach;encouraged to call for assist;exit alarm on  -CW notified nsg;supine;call light within reach;encouraged to call for assist;exit alarm on;RLE elevated;LLE elevated   All lines intact. V.S.S.  -MS    Recorded by [MS]  Ari DEMPSEY Mihaela, PT [CW] Brett P Donald [MS] Ari DEMPSEY Mihaela, PT      User Key  (r) = Recorded By, (t) = Taken By, (c) = Cosigned By    Initials Name Effective Dates    MS Ari Chairez, PT 12/01/15 -     CW Brett Bennett 12/13/16 -                 IP PT Goals       02/13/17 0956 02/06/17 1158       Bed Mobility PT LTG    Bed Mobility PT LTG, Date Established 02/13/17  -MS 02/06/17  -MS     Bed Mobility PT LTG, Time to Achieve 5 - 7 days  -MS 5 - 7 days  -MS     Bed Mobility PT LTG, Activity Type all bed mobility  -MS all bed mobility  -MS     Bed Mobility PT LTG, [place] Level minimum assist (75% patient effort)  -MS minimum assist (75% patient effort);2 person assist required  -MS     Transfer Training PT LTG    Transfer Training PT LTG, Date Established 02/13/17  -MS 02/06/17  -MS     Transfer Training PT LTG, Time to Achieve 5 - 7 days  -MS 5 - 7 days  -MS     Transfer Training PT LTG, Activity Type sit to stand/stand to sit  -MS sit to stand/stand to sit  -MS     Transfer Training PT LTG, [place] Level contact guard assist  -MS minimum assist (75% patient effort);2 person assist required  -MS     Transfer Training PT LTG, Assist Device walker, rolling  -MS walker, rolling  -MS     Transfer Training 2 PT LTG    Transfer Training PT 2 LTG, Date Established 02/13/17  -MS 02/06/17  -MS     Transfer Training PT 2 LTG, Time to Achieve 5 - 7 days  -MS 5 - 7 days  -MS     Transfer Training PT 2 LTG, Activity Type bed to chair /chair to bed  -MS bed to chair /chair to bed  -MS     Transfer Training PT 2 LTG, [place] Level contact guard assist  -MS minimum assist (75% patient effort);2 person assist required  -MS     Transfer Training PT 2 LTG, Assist Device walker, rolling  -MS walker, rolling  -MS     Gait Training PT LTG    Gait Training Goal PT LTG, Date Established 02/13/17  -MS 02/06/17  -MS     Gait Training Goal PT LTG, Time to Achieve 5 - 7 days  -MS 5 - 7 days  -MS     Gait Training  Goal PT LTG, [place] Level contact guard assist  -MS minimum assist (75% patient effort);2 person assist required  -MS     Gait Training Goal PT LTG, Assist Device walker, rolling  -MS walker, rolling  -MS     Gait Training Goal PT LTG, Distance to Achieve 80 feet  -MS 30 feet  -MS       User Key  (r) = Recorded By, (t) = Taken By, (c) = Cosigned By    Initials Name Provider Type    MS Ari DEMPSEY Mihaela, PT Physical Therapist          Physical Therapy Education     Title: PT OT SLP Therapies (Done)     Topic: Physical Therapy (Done)     Point: Mobility training (Done)    Learning Progress Summary    Learner Readiness Method Response Comment Documented by Status   Patient Acceptance E,D VU,NR  MS 02/15/17 0914 Done    Acceptance E,TB DU,VU   02/14/17 1028 Done    Acceptance E,D VU,NR  MS 02/13/17 0956 Done    Acceptance E,D VU,NR  MS 02/10/17 0934 Done    Acceptance E,TB DU,VU   02/09/17 1554 Done    Acceptance E,TB,D DU,VU   02/08/17 0943 Done    Acceptance D,E NR,VU  MS 02/06/17 1158 Done               Point: Home exercise program (Done)    Learning Progress Summary    Learner Readiness Method Response Comment Documented by Status   Patient Acceptance E,D VU,NR  MS 02/15/17 0914 Done    Acceptance E,TB DU,VU   02/14/17 1028 Done    Acceptance E,D VU,NR  MS 02/13/17 0956 Done    Acceptance E,D VU,NR  MS 02/10/17 0934 Done    Acceptance E,TB DU,VU   02/09/17 1554 Done    Acceptance E,TB,D DU,VU   02/08/17 0943 Done    Acceptance D,E NR,VU  MS 02/06/17 1158 Done               Point: Body mechanics (Done)    Learning Progress Summary    Learner Readiness Method Response Comment Documented by Status   Patient Acceptance E,D VU,NR  MS 02/15/17 0914 Done    Acceptance E,TB DU,VU   02/14/17 1028 Done    Acceptance E,D VU,NR  MS 02/13/17 0956 Done    Acceptance E,D VU,NR  MS 02/10/17 0934 Done    Acceptance E,TB DU,VU   02/09/17 1554 Done    Acceptance E,TB,D DU,VU   02/08/17 0943 Done    Acceptance  D,E NR,VU  MS 02/06/17 1158 Done               Point: Precautions (Done)    Learning Progress Summary    Learner Readiness Method Response Comment Documented by Status   Patient Acceptance E,D VU,NR  MS 02/15/17 0914 Done    Acceptance E,TB DUVU   02/14/17 1028 Done    Acceptance E,D VU,NR  MS 02/13/17 0956 Done    Acceptance E,D VU,NR  MS 02/10/17 0934 Done    Acceptance E,TB DU,VU  CW 02/09/17 1554 Done    Acceptance E,TB,D DUVU  CW 02/08/17 0943 Done    Acceptance D,E NR,VU  MS 02/06/17 1158 Done                      User Key     Initials Effective Dates Name Provider Type Discipline    MS 12/01/15 -  Ari Chairez, PT Physical Therapist PT     12/13/16 -  Brett Bennett Physical Therapy Assistant PT                    PT Recommendation and Plan  Anticipated Discharge Disposition: skilled nursing facility  Planned Therapy Interventions: balance training, bed mobility training, gait training, patient/family education, postural re-education, ROM (Range of Motion), strengthening, transfer training  PT Frequency: daily  Plan of Care Review  Plan Of Care Reviewed With: patient, family  Progress: progress towards functional goals is fair  Outcome Summary/Follow up Plan: Improved tolerance to functional activity this day with an increase in gait distance.  Pt. still limited overall by fatigue/SOA but has steadily improved each day with general functional mobility.  Continues to participate in ther. ex. program.           Outcome Measures       02/15/17 0900 02/14/17 1000 02/13/17 0900    How much help from another person do you currently need...    Turning from your back to your side while in flat bed without using bedrails? 3  -MS 3  -CW 3  -MS    Moving from lying on back to sitting on the side of a flat bed without bedrails? 3  -MS 3  -CW 2  -MS    Moving to and from a bed to a chair (including a wheelchair)? 3  -MS 3  -CW 3  -MS    Standing up from a chair using your arms (e.g., wheelchair, bedside  chair)? 3  -MS 3  -CW 3  -MS    Climbing 3-5 steps with a railing? 2  -MS 2  -CW 2  -MS    To walk in hospital room? 3  -MS 3  -CW 3  -MS    AM-PAC 6 Clicks Score 17  -MS 17  -CW 16  -MS    Functional Assessment    Outcome Measure Options AM-PAC 6 Clicks Basic Mobility (PT)  -MS AM-PAC 6 Clicks Basic Mobility (PT)  -CW AM-PAC 6 Clicks Basic Mobility (PT)  -MS      02/12/17 1400          How much help from another person do you currently need...    Turning from your back to your side while in flat bed without using bedrails? 2  -ZK      Moving from lying on back to sitting on the side of a flat bed without bedrails? 2  -ZK      Moving to and from a bed to a chair (including a wheelchair)? 2  -ZK      Standing up from a chair using your arms (e.g., wheelchair, bedside chair)? 2  -ZK      Climbing 3-5 steps with a railing? 1  -ZK      To walk in hospital room? 3  -ZK      AM-PAC 6 Clicks Score 12  -ZK      Functional Assessment    Outcome Measure Options AM-PAC 6 Clicks Basic Mobility (PT)  -ZK        User Key  (r) = Recorded By, (t) = Taken By, (c) = Cosigned By    Initials Name Provider Type    ZK Zorre Zeno Kimura, PT Physical Therapist    MS Ari Chairez, PT Physical Therapist    ARON Bennett Physical Therapy Assistant           Time Calculation:         PT Charges       02/15/17 0916          Time Calculation    Start Time 0847  -MS      Stop Time 0905  -MS      Time Calculation (min) 18 min  -MS      PT Received On 02/15/17  -MS      PT - Next Appointment 02/16/17  -MS        User Key  (r) = Recorded By, (t) = Taken By, (c) = Cosigned By    Initials Name Provider Type    MS Ari DEMPSEY Mihaela, PT Physical Therapist          Therapy Charges for Today     Code Description Service Date Service Provider Modifiers Qty    36554685542 HC PT THER PROC EA 15 MIN 2/15/2017 Ari Chairez, PT GP 1    59554604482 HC PT THER SUPP EA 15 MIN 2/15/2017 Ari Chairez, PT GP 1          PT G-Codes  Outcome Measure  Options: AM-PAC 6 Clicks Basic Mobility (PT)    Ari Chairez, PT  2/15/2017

## 2017-02-15 NOTE — PROGRESS NOTES
" LOS: 10 days   Primary Care Physician: Jono Verde Jr., MD     Subjective  Feels pretty good.  Anxious to go home ([place]) leg swelling and abdominal swelling much improved.    Vital Signs  Body mass index is 39.25 kg/(m^2).  Temp:  [97.2 °F (36.2 °C)-100.1 °F (37.8 °C)] 98.2 °F (36.8 °C)  Heart Rate:  [60-72] 72  Resp:  [16-20] 18  BP: (117-125)/(39-45) 117/39      Objective:  General Appearance:  In no acute distress.    Vital signs: (most recent): Blood pressure 117/39, pulse 72, temperature 98.2 °F (36.8 °C), temperature source Oral, resp. rate 18, height 62\" (157.5 cm), weight 214 lb 9.6 oz (97.3 kg), SpO2 94 %.    Lungs:  There are decreased breath sounds.  No wheezes, rales or rhonchi.    Heart: Normal rate.  Regular rhythm.  No murmur.   Abdomen: Abdomen is soft.  (Obese)There is no abdominal tenderness.   There is no mass.   Extremities: There is dependent edema.  (Trace- 1+ edema)  Neurological: Patient is alert.          Results Review:    I reviewed the patient's new clinical results.      Results from last 7 days  Lab Units 02/15/17  0515 02/14/17  0456   WBC 10*3/mm3 6.88 6.67   HEMOGLOBIN g/dL 9.9* 9.5*   PLATELETS 10*3/mm3 194 181       Results from last 7 days  Lab Units 02/15/17  0515 02/14/17  0456   SODIUM mmol/L 148* 148*   POTASSIUM mmol/L 3.6 3.8   CHLORIDE mmol/L 105 105   TOTAL CO2 mmol/L 30.2* 34.0*   BUN mg/dL 57* 56*   CREATININE mg/dL 1.62* 1.76*   CALCIUM mg/dL 9.9 9.8   GLUCOSE mg/dL 40* 86         Hemoglobin A1C:  Lab Results   Component Value Date    HGBA1C 6.04 (H) 02/05/2017       Glucose Range:  GLUCOSE   Date/Time Value Ref Range Status   02/15/2017 1102 224 (H) 70 - 130 mg/dL Final   02/15/2017 0700 98 70 - 130 mg/dL Final   02/15/2017 0626 51 (L) 70 - 130 mg/dL Final   02/14/2017 2055 208 (H) 70 - 130 mg/dL Final   02/14/2017 1646 206 (H) 70 - 130 mg/dL Final   02/14/2017 1105 109 70 - 130 mg/dL Final       Medication Review: Yes      Assessment/Plan     Active " Hospital Problems (** Indicates Principal Problem)    Diagnosis Date Noted   • **Acute on chronic diastolic (congestive) heart failure [I50.33] 02/08/2017   • Hypernatremia [E87.0] 02/13/2017   • Acute congestive heart failure [I50.9] 02/05/2017   • COPD (chronic obstructive pulmonary disease) [J44.9] 02/05/2017   • PAD (peripheral artery disease) [I73.9] 02/05/2017   • CKD (chronic kidney disease) stage 3, GFR 30-59 ml/min [N18.3] 02/05/2017   • Diabetes mellitus with chronic kidney disease [E11.22] 02/05/2017   • Dementia [F03.90] 02/05/2017   • Leg pain [M79.606] 02/05/2017      Resolved Hospital Problems    Diagnosis Date Noted Date Resolved   • MRSA infection [A49.02] 02/05/2017 02/08/2017   • Cellulitis [L03.90] 02/05/2017 02/15/2017       Assessment & Plan  1.  Acute and chronic diastolic congestive heart failure.  Still has access total fluid but intravascularly depleted.  Holding Bumex for now.  Likely can restart it in a couple days.  Hypernatremia noted.  2.  Chronic kidney disease stage III creatinine is improved.  Off Bumex for a couple days then restart and recheck labs  3.  Diabetes mellitus type 2 numbers noted.  She had 1 very high and one below.  Monitor at nursing home  4.  Dementia, on medications  5.  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  Has been stable.  6.  Stasis dermatitis of both legs, improved.    Disposition: Okay for transfer back to nursing home today.  [id number]    Ellen Chakraborty MD  02/15/17  11:47 AM

## 2017-02-16 NOTE — THERAPY DISCHARGE NOTE
Acute Care - Physical Therapy Discharge Summary  [facility]       Patient Name: Halley Becerra  : 1931  MRN: [id number]    Today's Date: 2017  Onset of Illness/Injury or Date of Surgery Date: 17    Date of Referral to PT: 17  Referring Physician: Ellen Chairez      Admit Date: 2017      PT Recommendation and Plan    Visit Dx:    ICD-10-CM ICD-9-CM   1. Acute congestive heart failure, unspecified congestive heart failure type I50.9 428.0   2. Pneumonia of both lungs due to infectious organism, unspecified part of lung J18.9 483.8   3. Cellulitis of lower extremity, unspecified laterality L03.119 682.6   4. Muscle weakness (generalized) M62.81 728.87             Outcome Measures       02/15/17 0900 17 1000       How much help from another person do you currently need...    Turning from your back to your side while in flat bed without using bedrails? 3  -MS 3  -CW     Moving from lying on back to sitting on the side of a flat bed without bedrails? 3  -MS 3  -CW     Moving to and from a bed to a chair (including a wheelchair)? 3  -MS 3  -CW     Standing up from a chair using your arms (e.g., wheelchair, bedside chair)? 3  -MS 3  -CW     Climbing 3-5 steps with a railing? 2  -MS 2  -CW     To walk in hospital room? 3  -MS 3  -CW     AM-PAC 6 Clicks Score 17  -MS 17  -CW     Functional Assessment    Outcome Measure Options AM-PAC 6 Clicks Basic Mobility (PT)  -MS AM-PAC 6 Clicks Basic Mobility (PT)  -CW       User Key  (r) = Recorded By, (t) = Taken By, (c) = Cosigned By    Initials Name Provider Type    MS Ari Chairez, PT Physical Therapist    CW Brett Bennett Physical Therapy Assistant                      IP PT Goals       17 1527 17 0956 17 1158    Bed Mobility PT LTG    Bed Mobility PT LTG, Date Established  17  -MS 17  -MS    Bed Mobility PT LTG, Time to Achieve  5 - 7 days  -MS 5 - 7 days  -MS    Bed Mobility PT LTG, Activity Type   all bed mobility  -MS all bed mobility  -MS    Bed Mobility PT LTG, [place] Level  minimum assist (75% patient effort)  -MS minimum assist (75% patient effort);2 person assist required  -MS    Bed Mobility PT LTG, Date Goal Reviewed 02/16/17  -MD      Bed Mobility PT LTG, Outcome goal not met  -MD      Bed Mobility PT LTG, Reason Goal Not Met discharged from [facility]  -MD      Transfer Training PT LTG    Transfer Training PT LTG, Date Established  02/13/17  -MS 02/06/17  -MS    Transfer Training PT LTG, Time to Achieve  5 - 7 days  -MS 5 - 7 days  -MS    Transfer Training PT LTG, Activity Type  sit to stand/stand to sit  -MS sit to stand/stand to sit  -MS    Transfer Training PT LTG, [place] Level  contact guard assist  -MS minimum assist (75% patient effort);2 person assist required  -MS    Transfer Training PT LTG, Assist Device  walker, rolling  -MS walker, rolling  -MS    Transfer Training PT  LTG, Date Goal Reviewed 02/16/17  -MD      Transfer Training PT LTG, Outcome goal not met  -MD      Transfer Training PT LTG, Reason Goal Not Met discharged from [facility]  -MD      Transfer Training 2 PT LTG    Transfer Training PT 2 LTG, Date Established  02/13/17  -MS 02/06/17  -MS    Transfer Training PT 2 LTG, Time to Achieve  5 - 7 days  -MS 5 - 7 days  -MS    Transfer Training PT 2 LTG, Activity Type  bed to chair /chair to bed  -MS bed to chair /chair to bed  -MS    Transfer Training PT 2 LTG, [place] Level  contact guard assist  -MS minimum assist (75% patient effort);2 person assist required  -MS    Transfer Training PT 2 LTG, Assist Device  walker, rolling  -MS walker, rolling  -MS    Transfer Training PT 2 LTG, Date Goal Reviewed 02/16/17  -MD      Transfer Training PT 2 LTG, Outcome goal not met  -MD      Transfer Training PT 2 LTG, Reason Goal Not Met discharged from [facility]  -MD      Gait Training PT LTG    Gait Training Goal PT LTG, Date Established  02/13/17  -MS 02/06/17  -MS    Gait  Training Goal PT LTG, Time to Achieve  5 - 7 days  -MS 5 - 7 days  -MS    Gait Training Goal PT LTG, [place] Level  contact guard assist  -MS minimum assist (75% patient effort);2 person assist required  -MS    Gait Training Goal PT LTG, Assist Device  walker, rolling  -MS walker, rolling  -MS    Gait Training Goal PT LTG, Distance to Achieve  80 feet  -MS 30 feet  -MS    Gait Training Goal PT LTG, Date Goal Reviewed 02/16/17  -MD      Gait Training Goal PT LTG, Outcome goal partially met  -MD      Gait Training Goal PT LTG, Reason Goal Not Met discharged from facility  -MD        User Key  (r) = Recorded By, (t) = Taken By, (c) = Cosigned By    Initials Name Provider Type    MD Lilliam Cameron, PT Physical Therapist    MS Ari Chairez, PT Physical Therapist              PT Discharge Summary  Reason for Discharge: Discharge from facility  Outcomes Achieved: Refer to plan of care for updates on goals achieved  Discharge Destination: SNF      Lilliam Cameron, PT   2/16/2017

## 2017-02-16 NOTE — PLAN OF CARE
Problem: Inpatient Physical Therapy  Goal: Bed Mobility Goal LTG- PT  Outcome: Unable to achieve outcome(s) by discharge Date Met:  02/16/17 02/16/17 1527   Bed Mobility PT LTG   Bed Mobility PT LTG, Date Goal Reviewed 02/16/17   Bed Mobility PT LTG, Outcome goal not met   Bed Mobility PT LTG, Reason Goal Not Met discharged from facility       Goal: Transfer Training Goal 1 LTG- PT  Outcome: Unable to achieve outcome(s) by discharge Date Met:  02/16/17 02/16/17 1527   Transfer Training PT LTG   Transfer Training PT LTG, Date Goal Reviewed 02/16/17   Transfer Training PT LTG, Outcome goal not met   Transfer Training PT LTG, Reason Goal Not Met discharged from facility       Goal: Transfer Training Goal 2 LTG- PT  Outcome: Unable to achieve outcome(s) by discharge Date Met:  02/16/17 02/16/17 1527   Transfer Training 2 PT LTG   Transfer Training PT 2 LTG, Date Goal Reviewed 02/16/17   Transfer Training PT 2 LTG, Outcome goal not met   Transfer Training PT 2 LTG, Reason Goal Not Met discharged from facility       Goal: Gait Training Goal LTG- PT  Outcome: Unable to achieve outcome(s) by discharge Date Met:  02/16/17 02/16/17 1527   Gait Training PT LTG   Gait Training Goal PT LTG, Date Goal Reviewed 02/16/17   Gait Training Goal PT LTG, Outcome goal partially met   Gait Training Goal PT LTG, Reason Goal Not Met discharged from facility

## 2017-02-16 NOTE — PROGRESS NOTES
Continued Stay Note  [facility]     Patient Name: Halley Becerra  MRN: [id number]  Today's Date: 2/16/2017    Admit Date: 2/5/2017          Discharge Plan       02/16/17 1508    Final Note    Final Note [place] via [facility]  no other needs              Discharge Codes       02/16/17 1509    Discharge Codes    Discharge Codes 03  Discharged/transferred to skilled nursing facility (SNF) with Medicare certification in anticipation of skilled care        Expected Discharge Date and Time     Expected Discharge Date Expected Discharge Time    Feb 15, 2017             Charlette Cage RN

## 2017-03-22 NOTE — PROGRESS NOTES
Subjective:     Encounter Date:03/22/2017      Patient ID: Halley Becerra is a 85 y.o. female.    Chief Complaint: acute on chronic diastolic CHF, CAD    History of Present Illness     Dear Dr. Verde,     I had the pleasure of seeing your patient in cardiac followup today.  As you well know, she is a nadya 85-year-old woman with history of atrial fibrillation and chronic diastolic congestive heart failure.  I last saw her several years ago.      She was in the hospital last month for congestive heart failure.  She was found to have an ejection fraction of 63% with moderate pulmonary hypertension.  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  She has a past history of coronary artery disease and bypass surgery.  She has had a lower extremity intervention for critical limb ischemia several years ago.      She returns for followup.  She was about 220 pounds when she left the hospital.  Now, by her scale, she is 240 pounds.  This is clearly different than the weight we are getting here in the office.  Nevertheless, she seems like she is getting volume overloaded again.  Her Bumex was just doubled to 2 mg twice a day which seems to be resulting in some fluid diuresis.          Review of Systems   Respiratory: Positive for shortness of breath.    All other systems reviewed and are negative.        ECG 12 Lead  Date/Time: 3/22/2017 12:18 PM  Performed by: KEYON DE LEÓN  Authorized by: KEYON DE LEÓN   Comparison: compared with previous ECG   Similar to previous ECG  Rhythm: sinus rhythm  BPM: 63  Comments: NSSTTWA               Objective:     Physical Exam   Constitutional: She is oriented to person, place, and time. She appears well-developed and well-nourished.   HENT:   Head: Normocephalic and atraumatic.   Neck: Normal range of motion. Neck supple.   Cardiovascular: Normal rate, regular rhythm and normal heart sounds.    LE edema to knees   Pulmonary/Chest: Effort normal. She has rales.    Abdominal: Soft. Bowel sounds are normal.   Musculoskeletal: Normal range of motion.   Neurological: She is alert and oriented to person, place, and time.   Skin: Skin is warm and dry.   Psychiatric: She has a normal mood and affect. Her behavior is normal. Thought content normal.   Vitals reviewed.      Lab Review:       Assessment:          Diagnosis Plan   1. Acute on chronic diastolic (congestive) heart failure            Plan:        It was a pleasure to see your patient in cardiac followup today.  She has regained about 20 pounds since her hospitalization a month ago.  She is on increased dose of Bumex, which seems to be working for her.  I would ask her to continue to follow her weights.  If they do not keep coming down in a week, they will contact me and I will probably add metolazone to her regimen.  She is to followup with me again in two months or sooner if symptoms warrant.      Heart Failure  Assessment  • NYHA class III-A - There is limitation of physical activity. The patient is comfortable at rest, but ordinary activity causes fatigue, palpitations or shortness of breath.  • ACE inhibitor prescribed  • Beta blocker prescribed  • Diuretics prescribed  • The most recent ejection fraction is 65%  • Left ventricular function is normal by qualitative assessment    Plan  • The patient has received heart failure education on the following topics: prognosis/end-of-life issues, dietary sodium restriction, medication instructions, smoking cessation, minimizing or avoiding NSAID use, symptom management, physical activity, weight monitoring, minimizing alcohol intake and referral for visiting nurse, heart failure education program, or management program  • The heart failure care plan was discussed with the patient today including: continuing the current program    Subjective/Objective  • The patient reports dyspnea  • Physical exam findings positive for rales and peripheral edema.       Coronary Artery  Disease  Assessment  • The patient has no angina    Plan  • Lifestyle modifications discussed include adhering to a heart healthy diet, avoidance of tobacco products, maintenance of a healthy weight, medication compliance, regular exercise and regular monitoring of cholesterol and blood pressure    Subjective - Objective  • There is a history of previous coronary artery bypass graft  • Current antiplatelet therapy includes aspirin 81 mg

## 2017-04-03 NOTE — DISCHARGE SUMMARY
The patient was admitted for acute on chronic diastolic CHF with 20 lb weight gain.  She was diuresed, but developed acute renal failure with little urine output.  Pulmonology was consulted and they stated that she was developing multifactorial acute on chronic hypoxic respiratory failure.  Dr. Chris and I had conversations with the family, who indicated that the patient did not want any life-prolonging measures such as intubation.  Because of her worsening respiratory status, she elected to change to comfort care.  She was transferred to palliative care and was allowed to pass at 4:00 am on 3/30/17.